# Patient Record
Sex: FEMALE | Race: WHITE | NOT HISPANIC OR LATINO | Employment: FULL TIME | URBAN - METROPOLITAN AREA
[De-identification: names, ages, dates, MRNs, and addresses within clinical notes are randomized per-mention and may not be internally consistent; named-entity substitution may affect disease eponyms.]

---

## 2022-01-11 ENCOUNTER — TELEPHONE (OUTPATIENT)
Dept: GASTROENTEROLOGY | Facility: CLINIC | Age: 27
End: 2022-01-11
Payer: COMMERCIAL

## 2022-01-11 NOTE — TELEPHONE ENCOUNTER
[facility] Call Center    Phone Message    May a detailed message be left on voicemail: yes     Reason for Call: Other: Per pt is a new pt and she has been DX with Ulcerative Colitis(UC) with her other clinic where she is from. Per pt will be having that clinic send her RECS in.      Action Taken: Message routed to:  [facility] ([facility] Gastro    Travel Screening: Not Applicable

## 2022-01-12 NOTE — TELEPHONE ENCOUNTER
REFERRAL INFORMATION:    Referring Provider:  N/A    Referring Clinic:  N/A    Reason for Visit/Diagnosis: IBD, Ulcerative Colitis      FUTURE VISIT INFORMATION:    Appointment Date: 1/25/2022    Appointment Time: 3:40 PM      NOTES STATUS DETAILS   OFFICE NOTE from Referring Provider N/A    OFFICE NOTE from Other Specialist Care Everywhere 5/26/2021, 2/9/2021, 10/6/2020 Office visit with Dr. Figueroa Prado ([facility])     9/23/2020 office visit with Dr. Edil Benitez ([facility])    HOSPITAL DISCHARGE SUMMARY/  ED VISITS N/A    OPERATIVE REPORT N/A    MEDICATION LIST N/A         ENDOSCOPY  N/A    COLONOSCOPY N/A    ERCP N/A    EUS N/A    STOOL TESTING Care Everywhere 11/9/2021, 4/28/2021, 12/15/2020, 9/25/2020   PERTINENT LABS Care Everywhere    PATHOLOGY REPORTS (RELATED) N/A    IMAGING (CT, MRI, EGD, MRCP, Small Bowel Follow Through/SBT, MR/CT Enterography) Care Everywhere [facility]:  - CT Abdomen Pelvis: 4/30/19  - US Abdomen: 4/25/19

## 2022-01-25 ENCOUNTER — VIRTUAL VISIT (OUTPATIENT)
Dept: GASTROENTEROLOGY | Facility: CLINIC | Age: 27
End: 2022-01-25
Payer: COMMERCIAL

## 2022-01-25 ENCOUNTER — PRE VISIT (OUTPATIENT)
Dept: GASTROENTEROLOGY | Facility: CLINIC | Age: 27
End: 2022-01-25
Payer: COMMERCIAL

## 2022-01-25 DIAGNOSIS — K50.10 CROHN'S DISEASE OF COLON WITHOUT COMPLICATION (H): Primary | ICD-10-CM

## 2022-01-25 DIAGNOSIS — A49.8 RECURRENT CLOSTRIDIOIDES DIFFICILE INFECTION: ICD-10-CM

## 2022-01-25 PROCEDURE — 99205 OFFICE O/P NEW HI 60 MIN: CPT | Mod: 95 | Performed by: INTERNAL MEDICINE

## 2022-01-25 RX ORDER — MESALAMINE 0.38 G/1
0.38 CAPSULE, EXTENDED RELEASE ORAL 4 TIMES DAILY
COMMUNITY
Start: 2019-12-01 | End: 2022-02-23

## 2022-01-25 RX ORDER — BUPROPION HYDROCHLORIDE 100 MG/1
100 TABLET ORAL DAILY
COMMUNITY
Start: 2021-01-01 | End: 2022-03-22

## 2022-01-25 RX ORDER — SERTRALINE HYDROCHLORIDE 100 MG/1
100 TABLET, FILM COATED ORAL 2 TIMES DAILY
COMMUNITY
Start: 2014-09-01 | End: 2022-03-22

## 2022-01-25 ASSESSMENT — ENCOUNTER SYMPTOMS
SINUS CONGESTION: 1
SINUS PAIN: 1
HOARSE VOICE: 1
EYE IRRITATION: 1

## 2022-01-25 NOTE — PATIENT INSTRUCTIONS
It was very nice to meet you today during your virtual visit.  Have outlined my recommendations below.    My office will contact you about scheduling the colonoscopy.    My office will contact you about scheduling the MRI.    You can go to your local [facility] lab to get the blood work and stool studies that have ordered.    Please continue your mesalamine as you are.  Take 4 pills daily.    Follow-up in 3 months to discuss the results of your testing.    Call or MyChart with questions.

## 2022-01-25 NOTE — LETTER
Date:January 26, 2022      Patient was self referred, no letter generated. Do not send.        [facility] Health Information

## 2022-01-25 NOTE — PROGRESS NOTES
IBD CLINIC VISIT    CC/REFERRING MD:  No ref. provider found    REASON FOR CONSULTATION: establish care Crohn's colitis    ASSESSMENT/PLAN:    1. Crohn's colitis -symptomatically, she is doing quite well.  Fecal calprotectin was in a good range in November.  Given her prior recurrent C. difficile (none since December 2020) and episode of reactive arthropathy last year I do think it is reasonable to make sure that we check a colonoscopy to ensure there is mucosal healing.  We will also check a fecal calprotectin and labs.  If mucosal healing is seen we will continue with the Apriso.  If mucosal healing is not seen we will discuss escalation of therapy.  We will get labs in preparation of escalation if needed.    -Colonoscopy  -Check labs  -Check fecal protectant  -Continue Apriso    2. Recurrent c diff -if she develops diarrhea we will recheck this.  We will check a colonoscopy to make sure that her colitis is under good control.  If she has recurrence of C. difficile we would consider an IMT.      IBD HISTORY  Age at diagnosis: 22/23 (2019)  Extent of disease: right sided colitis  Disease phenotype: inflammatory  Melly-anal disease: none  Current CD medications:  -Apriso 1500mg daily  Prior IBD surgeries: none  Prior IBD Medications:  -prednisone taper 2/2020    DRUG MONITORING  TPMT enzyme activity: pending    6-TGN/6-MMPN levels: NA    Biologic concentration: NA    DISEASE ASSESSMENT  Labs  No lab results found.    Invalid input(s):  ALB,  HGB  Fecal calprotectin: normal 11/2021  Endoscopy: see below - normal ileum. Moderate inflammation in cecum/ascending. Bx showed chronic active colitis. Remainder colon normal with normal bx  Enterography: none  C diff:   -July 2020, Oct 2020, Dec 2020 - then did 6 weeks vanco and none since    sIBDQ:   IBDQ Score Date IBDQ - Total Score  (Higher score better)   1/25/2022 61       IBD Health Care Maintenance:    Vaccinations:  All patients on biologics should avoid live  vaccines.    -- Influenza (every year)  -- TdaP (every 10 years)  -- Pneumococcal Pneumonia (once plus booster at 5 years)  -- Yearly assessment for latent Tb (verbal screening and exam, PPD or QuantiFERON-Tb testing)    One time confirmation of immunity or serologies:  -- Hepatitis A (serologies or immunizations)  -- Hepatitis B (serologies or immunizations)  -- Varicella  -- MMR  -- HPV (all aged 18-26)  -- Meningococcal meningitis (all patients at risk for meningitis)  -- Not immunosuppressed - can get all live vaccines    Bone mineral density screening   -- Recommend all patients supplement with calcium and vitamin D  -- Given prior steroid use recommend DEXA if not already done    Cancer Screening:  Colon cancer screening:  Given right sided colitis, recommend patient undergo regular dysplasia surveillance   Next dysplasia screening is recommended 2027.    Cervical cancer screening: Per OBGYN    Skin cancer screening: Not needed given no immunosuppression at this time    Depression Screening:  -- Over the last month, have you felt down, depressed, or hopeless? no  -- Over the last month, have you felt little interest or pleasure doing things? no    Misc:  -- Avoid tobacco use  -- Avoid NSAIDs as there is potentially a 25% chance of causing an IBD flare    Return to clinic in 3-4 months    Thank you for this consultation.  It was a pleasure to participate in the care of this patient; please contact us with any further questions.      This note was created with voice recognition software, and while reviewed for accuracy, typos may remain.     Luis Alberto Jacobs MD  Division of Gastroenterology, Hepatology and Nutrition  [facility]  Pager: [phone]      HPI:   Currently, here to establish care in IBD clinic.  Patient was diagnosed with Crohn's colitis in 2019.  At that time she had symptoms of nausea, bloody mucus in stool, weight loss, decreased appetite.  She underwent a colonoscopy which showed  "inflammation in the cecum and ascending.  The ileum and the rest of the colon were normal.  Biopsies showed chronic active colitis.  Upper endoscopy was unremarkable.  Patient was started on Apriso mesalamine.  She did require a prednisone taper in February 2020.  Since then, she believes she is done okay on the Apriso.    She did have 3 episodes of C. difficile.  Most recently this was in December 2020.  She is not aware of having any precipitating antibiotics but that was quite a while ago.  She was eventually treated with vancomycin 6-week taper and has not had any recurrence of the C. difficile.    She did have an episode of what she calls \"reactive arthritis\" in November 2021.  She was treated with prednisone 20 mg for 1 week and this resolved the issue.    Currently, she is having 1 bowel movement per day.  Sometimes she may have up to 3.  There is no blood.  There is no tenesmus there is no abdominal pain.    She denies any eye pain or redness.  She occasionally does get dry eyes.  She denies any current joint pains.  She denies any rashes or mouth sores.    ROS:    No fevers or chills  No weight loss  No blurry vision, double vision or change in vision  No sore throat  No lymphadenopathy  No headache, paraesthesias, or weakness in a limb  No shortness of breath or wheezing  No chest pain or pressure  No arthralgias or myalgias  No rashes or skin changes  No odynophagia or dysphagia  No BRBPR, hematochezia, melena  No dysuria, frequency or urgency  No hot/cold intolerance or polyria  No anxiety or depression    Extra intestinal manifestations of IBD:  No uveitis/episcleritis  No aphthous ulcers   No arthritis   No erythema nodosum/pyoderma gangrenosum.     PERTINENT PAST MEDICAL HISTORY:  No past medical history on file.    PREVIOUS SURGERIES:  No past surgical history on file.    PREVIOUS ENDOSCOPY:  No results found for this or any previous visit.]    ALLERGIES:   No Known Allergies    PERTINENT " MEDICATIONS:    Current Outpatient Medications:      buPROPion (WELLBUTRIN) 100 MG tablet, Take 100 mg by mouth daily, Disp: , Rfl:      mesalamine (APRISO ER) 0.375 g 24 hr capsule, Take 0.375 mg by mouth 4 times daily , Disp: , Rfl:      sertraline (ZOLOFT) 100 MG tablet, Take 100 mg by mouth 2 times daily, Disp: , Rfl:     SOCIAL HISTORY:  Social History     Socioeconomic History     Marital status: Single     Spouse name: Not on file     Number of children: Not on file     Years of education: Not on file     Highest education level: Not on file   Occupational History     Not on file   Tobacco Use     Smoking status: Never Smoker     Smokeless tobacco: Never Used   Substance and Sexual Activity     Alcohol use: Not on file     Drug use: Not on file     Sexual activity: Not on file   Other Topics Concern     Not on file   Social History Narrative     Not on file     Social Determinants of Health     Financial Resource Strain: Not on file   Food Insecurity: Not on file   Transportation Needs: Not on file   Physical Activity: Not on file   Stress: Not on file   Social Connections: Not on file   Intimate Partner Violence: Not on file   Housing Stability: Not on file       FAMILY HISTORY:  No family history on file.    Past/family/social history reviewed and no changes    PHYSICAL EXAMINATION:  Constitutional: aaox3, cooperative, pleasant, not dyspneic/diaphoretic, no acute distress  Vitals reviewed: There were no vitals taken for this visit.  Wt:   Wt Readings from Last 2 Encounters:   No data found for Wt      Eyes: Sclera anicteric/injected  Respiratory: Unlabored breathing  Skin: no jaundice  Psych: Normal affect      PERTINENT STUDIES:  Most recent CBC:  No lab results found.  Most recent hepatic panel:  No lab results found.    Invalid input(s): TB, ALK  Most recent creatinine:  No lab results found.        [facility]    Patient Name: DANIEL RITCHIE   YOB: 1995   Med Record Number:  [id number]   Date of Procedure: 11/11/2019   Referring Physician:Leonardo Qiu D.O.  Endoscopist : Fahad Cheung M.D.  Sedating Nurse: Jaz Guadarrama RN   PROCEDURE PERFORMED: Colonoscopy with   forceps biopsy    PRE OP DIAGNOSIS: Blood in stool.  Diagnostic colonoscopy.     POST OP DIAGNOSIS : Normal terminal ileum  Moderate right sided colitis    Instruments: [id number] #[id number]   Medications: Demerol 125 mg IVP, Versed 5 mg IVP. Sedation start   time: 1227 Stop Time: 1256  Visualization:: Good Tolerance: Good Complications: None  Limitations:   Extent of Exam: terminal ileum   Procedure Technique: A physical exam was performed. Informed   consent was obtained from the patient after explaining all the risks   (perforation, bleeding, infection and adverse effects to the medicine),   benefits and alternatives to the procedure which the patient appeared to   understand and so stated. The patient was connected to the monitoring   devices and placed in the left lateral position. Continuous oxygen was   provided with a nasal cannula and IV medicine administered thru an   indwelling cannula. After adequate conscious sedation was achieved, a   digital exam was performed and the colonoscope introduced in to the   rectum and advanced under direct visualization to the TI .   The TI was identified by visual landmarks. The scope was subsequently   removed slowly while carefully examining the color, texture, anatomy,   and integrity of the mucosa on the way out. In the rectum, the scope   was retroflexed to evaluate for internal hemorrhoids and anorectal   pathology. The patient was subsequently transferred to the recovery   area in satisfactory condition.   Scope Withdrawal Time ( applicable for colonoscopy ): 0:12:09.    FINDINGS: Terminal ileum - Normal. Multiple bxs taken  Cecum & Ascending colon - Moderate inflammation with punctate   ulcers were seen. The mucosa appeared edematous.   Transverse colon - Normal  Descending  colon - Normal  Sigmoid colon - Normal  Rectum - Normal.  Multiple bxs were taken and placed in separate jars as right colon, left   colon and rectum     PATHOLOGY RESULTS:  CASE: [id number]  PATIENT: DANIEL OWUSU  CLINICAL HISTORY/DIAGNOSIS: Blood in stool.  SPECIMEN SUBMITTED:  A. Terminal ileum biopsy.  B. Right colon biopsies.  C. Left colon biopsies.  D. Rectal biopsies.  Performed at [facility], [address] [place] [address] SURGICAL PATHOLOGY REPORT Specimen   No.: [id number]  DIAGNOSIS:  A. Terminal ileum, biopsy:  - no significant pathologic change.  B. Colon, right colon, biopsy:  - chronic active colitis with nonspecific features, no granulomas or  dysplasia identified.  C. Colon, left colon, biopsy:  - no significant pathologic change.  D. Rectum, rectal biopsies:  - no significant pathologic change.  GROSS DESCRIPTION:  A. Received in formalin labeled [facility] and terminal ileum  biopsy consists of seven irregular fragments of tan tissue measuring  up to 4 mm in greatest diameter. The specimen is totally submitted  into one cassette.  B. Received in formalin labeled [facility] and right colon  biopsies consists of multiple irregular fragments of tan-pink tissue  measuring 10 x 20 x 2 mm in aggregate. The specimen is totally  submitted into one cassette.  C. Received in formalin labeled [facility] and left colon biopsies  consists of two irregular fragments of tan tissue measuring up to 5 mm  in greatest diameter. The specimen is totally submitted into one  cassette.  D. Received in formalin labeled [facility] and rectal biopsies  consists of six irregular fragments of tan tissue, the largest  measuring 6 mm in greatest diameter. The specimen is totally  submitted into one cassette. ([place]/jose)  MICROSCOPIC DESCRIPTION:  A, C, D. Microscopic examination performed.  B. Sections show fragments of colonic mucosa with crypt architecture  distortion, focal crypt shortening, cryptitis, and  crypt abscess. No  granulomas or dysplasia identified. The findings raise the  possibility of chronic inflammatory bowel disease. Other differential  diagnosis includes prolonged infectious-type colitis or medication  injury. ([place]/jlc)  Final Diagnosis performed by Zaki Terrell MD Electronically signed  11/13/2019 12:34:57PM      Answers for HPI/ROS submitted by the patient on 1/25/2022  General Symptoms: No  Skin Symptoms: No  HENT Symptoms: Yes  EYE SYMPTOMS: Yes  HEART SYMPTOMS: No  LUNG SYMPTOMS: No  INTESTINAL SYMPTOMS: No  URINARY SYMPTOMS: No  GYNECOLOGIC SYMPTOMS: No  BREAST SYMPTOMS: No  SKELETAL SYMPTOMS: No  BLOOD SYMPTOMS: No  NERVOUS SYSTEM SYMPTOMS: No  MENTAL HEALTH SYMPTOMS: No  Congestion: Yes  Sinus pain: Yes   Voice hoarseness: Yes  Dry mouth: Yes  Dry eyes: Yes  Eye irritation: Yes

## 2022-01-25 NOTE — PROGRESS NOTES
Arleen is a 26 year old who is being evaluated via a billable video visit.      How would you like to obtain your AVS? MyChart  If the video visit is dropped, the invitation should be resent by: Send to e-mail at: [email or web address]  Will anyone else be joining your video visit? No      Video Start Time: 3:47 PM  Video-Visit Details    Type of service:  Video Visit    Video End Time:4:01 PM    Originating Location (pt. Location): Home    Distant Location (provider location):  [facility] [place]     Platform used for Video Visit: Icon[employer]

## 2022-01-25 NOTE — LETTER
1/25/2022         RE: Arleen Owusu  [address]  [place] [address]        Dear Colleague,    Thank you for referring your patient, Arleen Owusu, to the [facility] [place]. Please see a copy of my visit note below.    Arleen is a 26 year old who is being evaluated via a billable video visit.      How would you like to obtain your AVS? MyChart  If the video visit is dropped, the invitation should be resent by: Send to e-mail at: [email or web address]  Will anyone else be joining your video visit? No      Video Start Time: 3:47 PM  Video-Visit Details    Type of service:  Video Visit    Video End Time:4:01 PM    Originating Location (pt. Location): Home    Distant Location (provider location):  [facility] [place]     Platform used for Video Visit: Eye-Q    IBD CLINIC VISIT    CC/REFERRING MD:  No ref. provider found    REASON FOR CONSULTATION: establish care Crohn's colitis    ASSESSMENT/PLAN:    1. Crohn's colitis -symptomatically, she is doing quite well.  Fecal calprotectin was in a good range in November.  Given her prior recurrent C. difficile (none since December 2020) and episode of reactive arthropathy last year I do think it is reasonable to make sure that we check a colonoscopy to ensure there is mucosal healing.  We will also check a fecal calprotectin and labs.  If mucosal healing is seen we will continue with the Apriso.  If mucosal healing is not seen we will discuss escalation of therapy.  We will get labs in preparation of escalation if needed.    -Colonoscopy  -Check labs  -Check fecal protectant  -Continue Apriso    2. Recurrent c diff -if she develops diarrhea we will recheck this.  We will check a colonoscopy to make sure that her colitis is under good control.  If she has recurrence of C. difficile we would consider an IMT.      IBD HISTORY  Age at diagnosis: 22/23 (2019)  Extent of disease: right sided  colitis  Disease phenotype: inflammatory  Melly-anal disease: none  Current CD medications:  -Apriso 1500mg daily  Prior IBD surgeries: none  Prior IBD Medications:  -prednisone taper 2/2020    DRUG MONITORING  TPMT enzyme activity: pending    6-TGN/6-MMPN levels: NA    Biologic concentration: NA    DISEASE ASSESSMENT  Labs  No lab results found.    Invalid input(s):  ALB,  HGB  Fecal calprotectin: normal 11/2021  Endoscopy: see below - normal ileum. Moderate inflammation in cecum/ascending. Bx showed chronic active colitis. Remainder colon normal with normal bx  Enterography: none  C diff:   -July 2020, Oct 2020, Dec 2020 - then did 6 weeks vanco and none since    sIBDQ:   IBDQ Score Date IBDQ - Total Score  (Higher score better)   1/25/2022 61       IBD Health Care Maintenance:    Vaccinations:  All patients on biologics should avoid live vaccines.    -- Influenza (every year)  -- TdaP (every 10 years)  -- Pneumococcal Pneumonia (once plus booster at 5 years)  -- Yearly assessment for latent Tb (verbal screening and exam, PPD or QuantiFERON-Tb testing)    One time confirmation of immunity or serologies:  -- Hepatitis A (serologies or immunizations)  -- Hepatitis B (serologies or immunizations)  -- Varicella  -- MMR  -- HPV (all aged 18-26)  -- Meningococcal meningitis (all patients at risk for meningitis)  -- Not immunosuppressed - can get all live vaccines    Bone mineral density screening   -- Recommend all patients supplement with calcium and vitamin D  -- Given prior steroid use recommend DEXA if not already done    Cancer Screening:  Colon cancer screening:  Given right sided colitis, recommend patient undergo regular dysplasia surveillance   Next dysplasia screening is recommended 2027.    Cervical cancer screening: Per OBGYN    Skin cancer screening: Not needed given no immunosuppression at this time    Depression Screening:  -- Over the last month, have you felt down, depressed, or hopeless? no  -- Over the  "last month, have you felt little interest or pleasure doing things? no    Misc:  -- Avoid tobacco use  -- Avoid NSAIDs as there is potentially a 25% chance of causing an IBD flare    Return to clinic in 3-4 months    Thank you for this consultation.  It was a pleasure to participate in the care of this patient; please contact us with any further questions.      This note was created with voice recognition software, and while reviewed for accuracy, typos may remain.     Luis Alberto Jacobs MD  Division of Gastroenterology, Hepatology and Nutrition  [facility]  Pager: [phone]      HPI:   Currently, here to establish care in IBD clinic.  Patient was diagnosed with Crohn's colitis in 2019.  At that time she had symptoms of nausea, bloody mucus in stool, weight loss, decreased appetite.  She underwent a colonoscopy which showed inflammation in the cecum and ascending.  The ileum and the rest of the colon were normal.  Biopsies showed chronic active colitis.  Upper endoscopy was unremarkable.  Patient was started on Apriso mesalamine.  She did require a prednisone taper in February 2020.  Since then, she believes she is done okay on the Apriso.    She did have 3 episodes of C. difficile.  Most recently this was in December 2020.  She is not aware of having any precipitating antibiotics but that was quite a while ago.  She was eventually treated with vancomycin 6-week taper and has not had any recurrence of the C. difficile.    She did have an episode of what she calls \"reactive arthritis\" in November 2021.  She was treated with prednisone 20 mg for 1 week and this resolved the issue.    Currently, she is having 1 bowel movement per day.  Sometimes she may have up to 3.  There is no blood.  There is no tenesmus there is no abdominal pain.    She denies any eye pain or redness.  She occasionally does get dry eyes.  She denies any current joint pains.  She denies any rashes or mouth sores.    ROS:    No fevers or " chills  No weight loss  No blurry vision, double vision or change in vision  No sore throat  No lymphadenopathy  No headache, paraesthesias, or weakness in a limb  No shortness of breath or wheezing  No chest pain or pressure  No arthralgias or myalgias  No rashes or skin changes  No odynophagia or dysphagia  No BRBPR, hematochezia, melena  No dysuria, frequency or urgency  No hot/cold intolerance or polyria  No anxiety or depression    Extra intestinal manifestations of IBD:  No uveitis/episcleritis  No aphthous ulcers   No arthritis   No erythema nodosum/pyoderma gangrenosum.     PERTINENT PAST MEDICAL HISTORY:  No past medical history on file.    PREVIOUS SURGERIES:  No past surgical history on file.    PREVIOUS ENDOSCOPY:  No results found for this or any previous visit.]    ALLERGIES:   No Known Allergies    PERTINENT MEDICATIONS:    Current Outpatient Medications:      buPROPion (WELLBUTRIN) 100 MG tablet, Take 100 mg by mouth daily, Disp: , Rfl:      mesalamine (APRISO ER) 0.375 g 24 hr capsule, Take 0.375 mg by mouth 4 times daily , Disp: , Rfl:      sertraline (ZOLOFT) 100 MG tablet, Take 100 mg by mouth 2 times daily, Disp: , Rfl:     SOCIAL HISTORY:  Social History     Socioeconomic History     Marital status: Single     Spouse name: Not on file     Number of children: Not on file     Years of education: Not on file     Highest education level: Not on file   Occupational History     Not on file   Tobacco Use     Smoking status: Never Smoker     Smokeless tobacco: Never Used   Substance and Sexual Activity     Alcohol use: Not on file     Drug use: Not on file     Sexual activity: Not on file   Other Topics Concern     Not on file   Social History Narrative     Not on file     Social Determinants of Health     Financial Resource Strain: Not on file   Food Insecurity: Not on file   Transportation Needs: Not on file   Physical Activity: Not on file   Stress: Not on file   Social Connections: Not on file    Intimate Partner Violence: Not on file   Housing Stability: Not on file       FAMILY HISTORY:  No family history on file.    Past/family/social history reviewed and no changes    PHYSICAL EXAMINATION:  Constitutional: aaox3, cooperative, pleasant, not dyspneic/diaphoretic, no acute distress  Vitals reviewed: There were no vitals taken for this visit.  Wt:   Wt Readings from Last 2 Encounters:   No data found for Wt      Eyes: Sclera anicteric/injected  Respiratory: Unlabored breathing  Skin: no jaundice  Psych: Normal affect      PERTINENT STUDIES:  Most recent CBC:  No lab results found.  Most recent hepatic panel:  No lab results found.    Invalid input(s): TB, ALK  Most recent creatinine:  No lab results found.        [facility]    Patient Name: DANIEL RITCHIE   YOB: 1995   Med Record Number: [id number]   Date of Procedure: 11/11/2019   Referring Physician:Leonardo Qiu D.O.  Endoscopist : Fahad Cheung M.D.  Sedating Nurse: Jaz Guadarrama RN   PROCEDURE PERFORMED: Colonoscopy with   forceps biopsy    PRE OP DIAGNOSIS: Blood in stool.  Diagnostic colonoscopy.     POST OP DIAGNOSIS : Normal terminal ileum  Moderate right sided colitis    Instruments: [id number] #[id number]   Medications: Demerol 125 mg IVP, Versed 5 mg IVP. Sedation start   time: 1227 Stop Time: 1256  Visualization:: Good Tolerance: Good Complications: None  Limitations:   Extent of Exam: terminal ileum   Procedure Technique: A physical exam was performed. Informed   consent was obtained from the patient after explaining all the risks   (perforation, bleeding, infection and adverse effects to the medicine),   benefits and alternatives to the procedure which the patient appeared to   understand and so stated. The patient was connected to the monitoring   devices and placed in the left lateral position. Continuous oxygen was   provided with a nasal cannula and IV medicine administered thru an   indwelling cannula. After  adequate conscious sedation was achieved, a   digital exam was performed and the colonoscope introduced in to the   rectum and advanced under direct visualization to the TI .   The TI was identified by visual landmarks. The scope was subsequently   removed slowly while carefully examining the color, texture, anatomy,   and integrity of the mucosa on the way out. In the rectum, the scope   was retroflexed to evaluate for internal hemorrhoids and anorectal   pathology. The patient was subsequently transferred to the recovery   area in satisfactory condition.   Scope Withdrawal Time ( applicable for colonoscopy ): 0:12:09.    FINDINGS: Terminal ileum - Normal. Multiple bxs taken  Cecum & Ascending colon - Moderate inflammation with punctate   ulcers were seen. The mucosa appeared edematous.   Transverse colon - Normal  Descending colon - Normal  Sigmoid colon - Normal  Rectum - Normal.  Multiple bxs were taken and placed in separate jars as right colon, left   colon and rectum     PATHOLOGY RESULTS:  CASE: [id number]  PATIENT: ARLEEN OWUSU  CLINICAL HISTORY/DIAGNOSIS: Blood in stool.  SPECIMEN SUBMITTED:  A. Terminal ileum biopsy.  B. Right colon biopsies.  C. Left colon biopsies.  D. Rectal biopsies.  Performed at [address] SURGICAL PATHOLOGY REPORT Specimen   No.: [id number]  DIAGNOSIS:  A. Terminal ileum, biopsy:  - no significant pathologic change.  B. Colon, right colon, biopsy:  - chronic active colitis with nonspecific features, no granulomas or  dysplasia identified.  C. Colon, left colon, biopsy:  - no significant pathologic change.  D. Rectum, rectal biopsies:  - no significant pathologic change.  GROSS DESCRIPTION:  A. Received in formalin labeled Arleen Owusu and terminal ileum  biopsy consists of seven irregular fragments of tan tissue measuring  up to 4 mm in greatest diameter. The specimen is totally submitted  into one cassette.  B. Received in formalin  labeled Arleen Owusu and right colon  biopsies consists of multiple irregular fragments of tan-pink tissue  measuring 10 x 20 x 2 mm in aggregate. The specimen is totally  submitted into one cassette.  C. Received in formalin labeled Arleen Owusu and left colon biopsies  consists of two irregular fragments of tan tissue measuring up to 5 mm  in greatest diameter. The specimen is totally submitted into one  cassette.  D. Received in formalin labeled Arleen Owusu and rectal biopsies  consists of six irregular fragments of tan tissue, the largest  measuring 6 mm in greatest diameter. The specimen is totally  submitted into one cassette. (ND/jose)  MICROSCOPIC DESCRIPTION:  A, C, D. Microscopic examination performed.  B. Sections show fragments of colonic mucosa with crypt architecture  distortion, focal crypt shortening, cryptitis, and crypt abscess. No  granulomas or dysplasia identified. The findings raise the  possibility of chronic inflammatory bowel disease. Other differential  diagnosis includes prolonged infectious-type colitis or medication  injury. (WY/jlc)  Final Diagnosis performed by Zaki Terrell MD Electronically signed  11/13/2019 12:34:57PM      Answers for HPI/ROS submitted by the patient on 1/25/2022  General Symptoms: No  Skin Symptoms: No  HENT Symptoms: Yes  EYE SYMPTOMS: Yes  HEART SYMPTOMS: No  LUNG SYMPTOMS: No  INTESTINAL SYMPTOMS: No  URINARY SYMPTOMS: No  GYNECOLOGIC SYMPTOMS: No  BREAST SYMPTOMS: No  SKELETAL SYMPTOMS: No  BLOOD SYMPTOMS: No  NERVOUS SYSTEM SYMPTOMS: No  MENTAL HEALTH SYMPTOMS: No  Congestion: Yes  Sinus pain: Yes   Voice hoarseness: Yes  Dry mouth: Yes  Dry eyes: Yes  Eye irritation: Yes          Again, thank you for allowing me to participate in the care of your patient.        Sincerely,        Luis Alberto Jacobs MD

## 2022-01-26 ENCOUNTER — TELEPHONE (OUTPATIENT)
Dept: GASTROENTEROLOGY | Facility: CLINIC | Age: 27
End: 2022-01-26
Payer: COMMERCIAL

## 2022-01-26 NOTE — TELEPHONE ENCOUNTER
lvm to schedule 3-4 month follow up with DR. SCHMITZ, labs and img, left call center phone number and set mychart.

## 2022-01-27 ENCOUNTER — TELEPHONE (OUTPATIENT)
Dept: GASTROENTEROLOGY | Facility: CLINIC | Age: 27
End: 2022-01-27
Payer: COMMERCIAL

## 2022-01-27 NOTE — TELEPHONE ENCOUNTER
Screening Questions  Blue=prep questions Red=location Green=sedation   1. Are you active on mychart? Y    2. What insurance is in the chart? MEDICA     3.  Ordering/Referring Provider: Luis Alberto Jacobs MD    4. BMI 22.8, If greater than 40 review exclusion criteria also will need EXTENDED PREP    5.  Respiratory Screening (If yes to any of the following HOSPITAL setting only):     Do you use daily home oxygen? N  Do you have mod to severe Obstructive Sleep Apnea? N (can be seen at [facility] or hospital setting)    Do you have Pulmonary Hypertension? N   Do you have UNCONTROLLED asthma? N    6. Have you had a heart or lung transplant? N  (If yes, please review exclusion criteria)    7. Are you currently on dialysis?N  (If yes, schedule in HOSPITAL setting only)(If yes, please send Golytely prep)    8. Do you have chronic kidney disease? N (If yes, please send Golytely prep)    9. Have you had a stroke or Transient ischemic attack (TIA) within 6 months? N (If yes, do not schedule at [facility])    10. In the past 6 months, have you had any heart related issues including cardiomyopathy or heart attack? N (If yes, please review exclusion criteria)           If yes, did it require cardiac stenting or other implantable device?  (If yes, please review exclusion criteria)      11. Do you have any implantable devices in your body (pacemaker, defib, LVAD)? N (If yes, schedule at [facility])    12. Do you take nitroglycerin? If yes, how often? N (if yes, schedule at HOSPITAL setting)    13. Are you currently taking any blood thinners?N (If yes- inform patient to follow up with PCP or provider for follow up instructions)     14. Are you a diabetic? N (If yes, please send Golytely prep)    15. (Females) Are you currently pregnant? N  If yes, how many weeks?      16. Are you taking any prescription pain medications on a routine schedule? N If yes, MAC sedation and patient will need EXTENDED PREP.    17. Do you have any chemical dependencies  such as alcohol, street drugs, or methadone? N If yes, MAC sedation     18. Do you have any history of post-traumatic stress syndrome, severe anxiety or history of psychosis? N  If yes, MAC sedation.     19. Do you transfer independently? Y    20.  Do you have any issues with constipation? N   If yes, pt will need EXTENDED PREP     21. Preferred Pharmacy for Pre Prescription [facility]    Scheduling Details    Which Colonoscopy Prep was Sent?: MPREP  Type of Procedure Scheduled: COLON  Surgeon: NADIR  Date of Procedure: 2/14  Location: [facility]  Caller (Please ask for phone number if not scheduled by patient):       Sedation Type: MAC  Conscious Sedation- Needs  for 6 hours after the procedure  MAC/General-Needs  for 24 hours after procedure    Pre-op Required at [facility] [place] [place] and OR for MAC sedation: N  (if yes advise patient they will need a pre-op prior to procedure)      Informed patient they will need an adult  Y  Cannot take any type of public or medical transportation alone    Pre-Procedure Covid test to be completed at [facility] or Externally: 2/10    Confirmed Nurse will call to complete assessment Y    Additional comments:  ([facility] PATIENTS NEED EXTENDED PREP)

## 2022-02-01 DIAGNOSIS — Z11.59 ENCOUNTER FOR SCREENING FOR OTHER VIRAL DISEASES: Primary | ICD-10-CM

## 2022-02-04 ENCOUNTER — LAB (OUTPATIENT)
Dept: LAB | Facility: CLINIC | Age: 27
End: 2022-02-04
Payer: COMMERCIAL

## 2022-02-04 DIAGNOSIS — K50.10 CROHN'S DISEASE OF COLON WITHOUT COMPLICATION (H): ICD-10-CM

## 2022-02-04 LAB
ALBUMIN SERPL-MCNC: 4.3 G/DL (ref 3.5–5)
ALP SERPL-CCNC: 79 U/L (ref 45–120)
ALT SERPL W P-5'-P-CCNC: 12 U/L (ref 0–45)
ANION GAP SERPL CALCULATED.3IONS-SCNC: 11 MMOL/L (ref 5–18)
AST SERPL W P-5'-P-CCNC: 18 U/L (ref 0–40)
BASOPHILS # BLD AUTO: 0 10E3/UL (ref 0–0.2)
BASOPHILS NFR BLD AUTO: 0 %
BILIRUB DIRECT SERPL-MCNC: 0.2 MG/DL
BILIRUB SERPL-MCNC: 0.8 MG/DL (ref 0–1)
BUN SERPL-MCNC: 8 MG/DL (ref 8–22)
C REACTIVE PROTEIN LHE: 0.3 MG/DL (ref 0–0.8)
CALCIUM SERPL-MCNC: 9.3 MG/DL (ref 8.5–10.5)
CHLORIDE BLD-SCNC: 104 MMOL/L (ref 98–107)
CO2 SERPL-SCNC: 22 MMOL/L (ref 22–31)
CREAT SERPL-MCNC: 0.78 MG/DL (ref 0.6–1.1)
EOSINOPHIL # BLD AUTO: 0.1 10E3/UL (ref 0–0.7)
EOSINOPHIL NFR BLD AUTO: 1 %
ERYTHROCYTE [DISTWIDTH] IN BLOOD BY AUTOMATED COUNT: 13.7 % (ref 10–15)
ERYTHROCYTE [SEDIMENTATION RATE] IN BLOOD BY WESTERGREN METHOD: 7 MM/HR (ref 0–20)
FERRITIN SERPL-MCNC: 53 NG/ML (ref 10–130)
FOLATE SERPL-MCNC: 13.5 NG/ML
GFR SERPL CREATININE-BSD FRML MDRD: >90 ML/MIN/1.73M2
GLUCOSE BLD-MCNC: 82 MG/DL (ref 70–125)
HCT VFR BLD AUTO: 38.2 % (ref 35–47)
HGB BLD-MCNC: 12.5 G/DL (ref 11.7–15.7)
IGA SERPL-MCNC: 118 MG/DL (ref 65–400)
IMM GRANULOCYTES # BLD: 0 10E3/UL
IMM GRANULOCYTES NFR BLD: 0 %
LYMPHOCYTES # BLD AUTO: 2.3 10E3/UL (ref 0.8–5.3)
LYMPHOCYTES NFR BLD AUTO: 32 %
MCH RBC QN AUTO: 28.3 PG (ref 26.5–33)
MCHC RBC AUTO-ENTMCNC: 32.7 G/DL (ref 31.5–36.5)
MCV RBC AUTO: 86 FL (ref 78–100)
MONOCYTES # BLD AUTO: 0.5 10E3/UL (ref 0–1.3)
MONOCYTES NFR BLD AUTO: 7 %
NEUTROPHILS # BLD AUTO: 4.5 10E3/UL (ref 1.6–8.3)
NEUTROPHILS NFR BLD AUTO: 61 %
PLATELET # BLD AUTO: 251 10E3/UL (ref 150–450)
POTASSIUM BLD-SCNC: 4.1 MMOL/L (ref 3.5–5)
PROT SERPL-MCNC: 6.7 G/DL (ref 6–8)
RBC # BLD AUTO: 4.42 10E6/UL (ref 3.8–5.2)
SODIUM SERPL-SCNC: 137 MMOL/L (ref 136–145)
TSH SERPL DL<=0.005 MIU/L-ACNC: 1.38 UIU/ML (ref 0.3–5)
VIT B12 SERPL-MCNC: 299 PG/ML (ref 213–816)
WBC # BLD AUTO: 7.3 10E3/UL (ref 4–11)

## 2022-02-04 PROCEDURE — 85652 RBC SED RATE AUTOMATED: CPT

## 2022-02-04 PROCEDURE — 86704 HEP B CORE ANTIBODY TOTAL: CPT

## 2022-02-04 PROCEDURE — 86706 HEP B SURFACE ANTIBODY: CPT

## 2022-02-04 PROCEDURE — 36415 COLL VENOUS BLD VENIPUNCTURE: CPT

## 2022-02-04 PROCEDURE — 99000 SPECIMEN HANDLING OFFICE-LAB: CPT

## 2022-02-04 PROCEDURE — 82784 ASSAY IGA/IGD/IGG/IGM EACH: CPT

## 2022-02-04 PROCEDURE — 82607 VITAMIN B-12: CPT

## 2022-02-04 PROCEDURE — 80299 QUANTITATIVE ASSAY DRUG: CPT | Mod: 90

## 2022-02-04 PROCEDURE — 82657 ENZYME CELL ACTIVITY: CPT | Mod: 90

## 2022-02-04 PROCEDURE — 86481 TB AG RESPONSE T-CELL SUSP: CPT

## 2022-02-04 PROCEDURE — 87340 HEPATITIS B SURFACE AG IA: CPT

## 2022-02-04 PROCEDURE — 82746 ASSAY OF FOLIC ACID SERUM: CPT

## 2022-02-04 PROCEDURE — 82728 ASSAY OF FERRITIN: CPT

## 2022-02-04 PROCEDURE — 80050 GENERAL HEALTH PANEL: CPT

## 2022-02-04 PROCEDURE — 86140 C-REACTIVE PROTEIN: CPT

## 2022-02-04 PROCEDURE — 82248 BILIRUBIN DIRECT: CPT

## 2022-02-05 LAB — HBV SURFACE AG SERPL QL IA: NONREACTIVE

## 2022-02-06 ENCOUNTER — HEALTH MAINTENANCE LETTER (OUTPATIENT)
Age: 27
End: 2022-02-06

## 2022-02-07 LAB
GAMMA INTERFERON BACKGROUND BLD IA-ACNC: 0 IU/ML
HBV CORE AB SERPL QL IA: NEGATIVE
HBV SURFACE AB SERPLBLD QL IA.RAPID: NEGATIVE
M TB IFN-G BLD-IMP: NEGATIVE
M TB IFN-G CD4+ BCKGRND COR BLD-ACNC: 10 IU/ML
MITOGEN IGNF BCKGRD COR BLD-ACNC: 0 IU/ML
MITOGEN IGNF BCKGRD COR BLD-ACNC: 0 IU/ML
QUANTIFERON MITOGEN: 10 IU/ML
QUANTIFERON NIL TUBE: 0 IU/ML
QUANTIFERON TB1 TUBE: 0 IU/ML
QUANTIFERON TB2 TUBE: 0

## 2022-02-08 ENCOUNTER — TELEPHONE (OUTPATIENT)
Dept: GASTROENTEROLOGY | Facility: CLINIC | Age: 27
End: 2022-02-08
Payer: COMMERCIAL

## 2022-02-08 DIAGNOSIS — R11.0 NAUSEA: ICD-10-CM

## 2022-02-08 DIAGNOSIS — K50.10 CROHN'S DISEASE OF COLON WITHOUT COMPLICATION (H): Primary | ICD-10-CM

## 2022-02-08 LAB
6-TGN ENTSUB RBC: <22 PMOL/8X10(8)RBC (ref 235–450)
6MMP ENTSUB RBC: <334 PMOL/8X10(8)RBC
TPMT BLD-CCNC: 24.3 U/ML

## 2022-02-08 NOTE — TELEPHONE ENCOUNTER
Attempted to contact patient for pre assessment questions. No answer.     Left message to return call to [phone] #2    Sheridan Sigala RN

## 2022-02-08 NOTE — TELEPHONE ENCOUNTER
Patient scheduled for colonoscopy on 2/14/22.     Covid test scheduled: 2/10/22    Arrival time: 1000    Facility location: [facility]    Sedation type: MAC    Indication for procedure: crohns    Anticoagulations? no     Bowel prep recommendation: Miralax/Magnesium citrate/Dulcolax     Pre visit planning completed.    Sheridan Sigala RN

## 2022-02-09 RX ORDER — BISACODYL 5 MG
TABLET, DELAYED RELEASE (ENTERIC COATED) ORAL
Qty: 4 TABLET | Refills: 0 | Status: SHIPPED | OUTPATIENT
Start: 2022-02-09 | End: 2022-03-21

## 2022-02-09 RX ORDER — ONDANSETRON 4 MG/1
TABLET, FILM COATED ORAL
Qty: 3 TABLET | Refills: 0 | Status: SHIPPED | OUTPATIENT
Start: 2022-02-09 | End: 2022-03-21

## 2022-02-09 NOTE — TELEPHONE ENCOUNTER
Pre assessment questions completed for upcoming colonoscopy procedure scheduled on 2.14.2022    COVID test scheduled 2.10.2022    Reviewed procedural arrival time 1000 and facility location [facility].    Designated  policy reviewed. Instructed to have someone stay 24 hours post procedure.     Anticoagulation/blood thinners? no    Electronic implanted devices? No    Golytely prep per pt request.  Also sent pt a prescription for zofran d/t pt expressing nausea with prior colonoscopy preps.    Reviewed Golytely prep instructions with patient. No fiber/iron supplements or foods that contain nuts/seeds prior to procedure.     Prep instructions sent via [employer].  Prep prescription sent to [id number] - [place], [place] - [address] AT [facility]    Patient verbalized understanding and had no questions or concerns at this time.    Gladis Liz RN

## 2022-02-10 ENCOUNTER — LAB (OUTPATIENT)
Dept: LAB | Facility: CLINIC | Age: 27
End: 2022-02-10
Payer: COMMERCIAL

## 2022-02-10 DIAGNOSIS — Z11.59 ENCOUNTER FOR SCREENING FOR OTHER VIRAL DISEASES: ICD-10-CM

## 2022-02-10 PROCEDURE — U0005 INFEC AGEN DETEC AMPLI PROBE: HCPCS

## 2022-02-10 PROCEDURE — U0003 INFECTIOUS AGENT DETECTION BY NUCLEIC ACID (DNA OR RNA); SEVERE ACUTE RESPIRATORY SYNDROME CORONAVIRUS 2 (SARS-COV-2) (CORONAVIRUS DISEASE [COVID-19]), AMPLIFIED PROBE TECHNIQUE, MAKING USE OF HIGH THROUGHPUT TECHNOLOGIES AS DESCRIBED BY CMS-2020-01-R: HCPCS

## 2022-02-11 ENCOUNTER — LAB (OUTPATIENT)
Dept: LAB | Facility: CLINIC | Age: 27
End: 2022-02-11
Payer: COMMERCIAL

## 2022-02-11 DIAGNOSIS — K50.10 CROHN'S DISEASE OF COLON WITHOUT COMPLICATION (H): ICD-10-CM

## 2022-02-11 LAB — SARS-COV-2 RNA RESP QL NAA+PROBE: NEGATIVE

## 2022-02-11 PROCEDURE — 83993 ASSAY FOR CALPROTECTIN FECAL: CPT

## 2022-02-14 ENCOUNTER — ANESTHESIA EVENT (OUTPATIENT)
Dept: SURGERY | Facility: AMBULATORY SURGERY CENTER | Age: 27
End: 2022-02-14
Payer: COMMERCIAL

## 2022-02-14 ENCOUNTER — HOSPITAL ENCOUNTER (OUTPATIENT)
Facility: AMBULATORY SURGERY CENTER | Age: 27
End: 2022-02-14
Attending: INTERNAL MEDICINE
Payer: COMMERCIAL

## 2022-02-14 ENCOUNTER — ANESTHESIA (OUTPATIENT)
Dept: SURGERY | Facility: AMBULATORY SURGERY CENTER | Age: 27
End: 2022-02-14
Payer: COMMERCIAL

## 2022-02-14 VITALS
DIASTOLIC BLOOD PRESSURE: 70 MMHG | SYSTOLIC BLOOD PRESSURE: 110 MMHG | RESPIRATION RATE: 17 BRPM | HEIGHT: 68 IN | TEMPERATURE: 97.2 F | HEART RATE: 68 BPM | OXYGEN SATURATION: 99 % | BODY MASS INDEX: 22.73 KG/M2 | WEIGHT: 150 LBS

## 2022-02-14 VITALS — HEART RATE: 66 BPM

## 2022-02-14 LAB
CALPROTECTIN STL-MCNT: 74.1 MG/KG (ref 0–49.9)
COLONOSCOPY: NORMAL
HCG UR QL: NEGATIVE
INTERNAL QC OK POCT: NORMAL
POCT KIT EXPIRATION DATE: NORMAL
POCT KIT LOT NUMBER: NORMAL

## 2022-02-14 PROCEDURE — 88305 TISSUE EXAM BY PATHOLOGIST: CPT | Mod: 26 | Performed by: PATHOLOGY

## 2022-02-14 PROCEDURE — 88305 TISSUE EXAM BY PATHOLOGIST: CPT | Mod: TC | Performed by: INTERNAL MEDICINE

## 2022-02-14 PROCEDURE — 45380 COLONOSCOPY AND BIOPSY: CPT

## 2022-02-14 RX ORDER — LIDOCAINE 40 MG/G
CREAM TOPICAL
Status: DISCONTINUED | OUTPATIENT
Start: 2022-02-14 | End: 2022-02-14 | Stop reason: HOSPADM

## 2022-02-14 RX ORDER — ONDANSETRON 2 MG/ML
4 INJECTION INTRAMUSCULAR; INTRAVENOUS EVERY 6 HOURS PRN
Status: DISCONTINUED | OUTPATIENT
Start: 2022-02-14 | End: 2022-02-15 | Stop reason: HOSPADM

## 2022-02-14 RX ORDER — ONDANSETRON 2 MG/ML
4 INJECTION INTRAMUSCULAR; INTRAVENOUS
Status: DISCONTINUED | OUTPATIENT
Start: 2022-02-14 | End: 2022-02-14 | Stop reason: HOSPADM

## 2022-02-14 RX ORDER — NALOXONE HYDROCHLORIDE 0.4 MG/ML
0.4 INJECTION, SOLUTION INTRAMUSCULAR; INTRAVENOUS; SUBCUTANEOUS
Status: DISCONTINUED | OUTPATIENT
Start: 2022-02-14 | End: 2022-02-15 | Stop reason: HOSPADM

## 2022-02-14 RX ORDER — ONDANSETRON 4 MG/1
4 TABLET, ORALLY DISINTEGRATING ORAL EVERY 6 HOURS PRN
Status: DISCONTINUED | OUTPATIENT
Start: 2022-02-14 | End: 2022-02-15 | Stop reason: HOSPADM

## 2022-02-14 RX ORDER — SODIUM CHLORIDE, SODIUM LACTATE, POTASSIUM CHLORIDE, CALCIUM CHLORIDE 600; 310; 30; 20 MG/100ML; MG/100ML; MG/100ML; MG/100ML
INJECTION, SOLUTION INTRAVENOUS CONTINUOUS PRN
Status: DISCONTINUED | OUTPATIENT
Start: 2022-02-14 | End: 2022-02-14

## 2022-02-14 RX ORDER — PROPOFOL 10 MG/ML
INJECTION, EMULSION INTRAVENOUS PRN
Status: DISCONTINUED | OUTPATIENT
Start: 2022-02-14 | End: 2022-02-14

## 2022-02-14 RX ORDER — FLUMAZENIL 0.1 MG/ML
0.2 INJECTION, SOLUTION INTRAVENOUS
Status: DISCONTINUED | OUTPATIENT
Start: 2022-02-14 | End: 2022-02-15 | Stop reason: HOSPADM

## 2022-02-14 RX ORDER — SIMETHICONE
LIQUID (ML) MISCELLANEOUS PRN
Status: DISCONTINUED | OUTPATIENT
Start: 2022-02-14 | End: 2022-02-14 | Stop reason: HOSPADM

## 2022-02-14 RX ORDER — NALOXONE HYDROCHLORIDE 0.4 MG/ML
0.2 INJECTION, SOLUTION INTRAMUSCULAR; INTRAVENOUS; SUBCUTANEOUS
Status: DISCONTINUED | OUTPATIENT
Start: 2022-02-14 | End: 2022-02-15 | Stop reason: HOSPADM

## 2022-02-14 RX ORDER — PROCHLORPERAZINE MALEATE 10 MG
10 TABLET ORAL EVERY 6 HOURS PRN
Status: DISCONTINUED | OUTPATIENT
Start: 2022-02-14 | End: 2022-02-15 | Stop reason: HOSPADM

## 2022-02-14 RX ORDER — PROPOFOL 10 MG/ML
INJECTION, EMULSION INTRAVENOUS CONTINUOUS PRN
Status: DISCONTINUED | OUTPATIENT
Start: 2022-02-14 | End: 2022-02-14

## 2022-02-14 RX ADMIN — PROPOFOL 40 MG: 10 INJECTION, EMULSION INTRAVENOUS at 11:59

## 2022-02-14 RX ADMIN — PROPOFOL 80 MG: 10 INJECTION, EMULSION INTRAVENOUS at 11:44

## 2022-02-14 RX ADMIN — PROPOFOL 150 MCG/KG/MIN: 10 INJECTION, EMULSION INTRAVENOUS at 11:40

## 2022-02-14 RX ADMIN — SODIUM CHLORIDE, SODIUM LACTATE, POTASSIUM CHLORIDE, CALCIUM CHLORIDE: 600; 310; 30; 20 INJECTION, SOLUTION INTRAVENOUS at 10:42

## 2022-02-14 ASSESSMENT — MIFFLIN-ST. JEOR: SCORE: 1468.9

## 2022-02-14 NOTE — H&P
"Arleen Ouwsu  [id number]  female  26 year old      Reason for procedure/surgery: Crohn's Disease    There is no problem list on file for this patient.      Past Surgical History:  History reviewed. No pertinent surgical history.    Past Medical History: No past medical history on file.    Social History:   Social History     Tobacco Use     Smoking status: Never Smoker     Smokeless tobacco: Never Used   Substance Use Topics     Alcohol use: Not on file       Family History: History reviewed. No pertinent family history.    Allergies: No Known Allergies    Active Medications:   Current Outpatient Medications   Medication Sig Dispense Refill     bisacodyl (DULCOLAX) 5 MG EC tablet Take 2 tabs by mouth at 3pm one day prior to procedure.  Take 2 tabs by mouth at 11pm the night before procedure. 4 tablet 0     buPROPion (WELLBUTRIN) 100 MG tablet Take 100 mg by mouth daily       mesalamine (APRISO ER) 0.375 g 24 hr capsule Take 0.375 mg by mouth 4 times daily        ondansetron (ZOFRAN) 4 MG tablet Take one tablet by mouth 30 minutes prior to starting colonoscopy prep. May take one tablet by mouth every 6 hours as needed. 3 tablet 0     polyethylene glycol (GOLYTELY) 236 g suspension Take as directed in patient instructions.  Day before procedure at 6:00pm drink 8oz glass of mixture every 15 min until the jug is half empty.  Store the rest in the refrigerator.  At 11pm drink second half of container.  Drink one 8oz glass every 15 minutes until jug is empty. 4000 mL 0     sertraline (ZOLOFT) 100 MG tablet Take 100 mg by mouth 2 times daily         Systemic Review:   CONSTITUTIONAL: NEGATIVE for fever, chills, change in weight  ENT/MOUTH: NEGATIVE for ear, mouth and throat problems  RESP: NEGATIVE for significant cough or SOB  CV: NEGATIVE for chest pain, palpitations or peripheral edema    Physical Examination:   Vital Signs: /63   Temp 97.8  F (36.6  C) (Temporal)   Resp 16   Ht 1.727 m (5' 8\")   Wt 68 kg (150 " lb)   SpO2 96%   BMI 22.81 kg/m    GENERAL: healthy, alert and no distress  NECK: no adenopathy, no asymmetry, masses, or scars  RESP: lungs clear to auscultation - no rales, rhonchi or wheezes  CV: regular rate and rhythm, normal S1 S2, no S3 or S4, no murmur, click or rub, no peripheral edema and peripheral pulses strong  ABDOMEN: soft, nontender, no hepatosplenomegaly, no masses and bowel sounds normal  MS: no gross musculoskeletal defects noted, no edema    Plan: Appropriate to proceed as scheduled.      Luis Alberto Jacobs MD  2/14/2022    PCP:  No Ref-Primary, Physician

## 2022-02-14 NOTE — ANESTHESIA CARE TRANSFER NOTE
Patient: Arleen Owusu    Procedure: Procedure(s):  COLONOSCOPY, WITH  BIOPSY       Diagnosis: Crohn's disease of colon without complication (H) [K50.10]  Diagnosis Additional Information: No value filed.    Anesthesia Type:   No value filed.     Note:    Oropharynx: oropharynx clear of all foreign objects and spontaneously breathing  Level of Consciousness: awake  Oxygen Supplementation: room air    Independent Airway: airway patency satisfactory and stable  Dentition: dentition unchanged  Vital Signs Stable: post-procedure vital signs reviewed and stable  Report to RN Given: handoff report given  Patient transferred to: Phase II    Handoff Report: Identifed the Patient, Identified the Reponsible Provider, Reviewed the pertinent medical history, Discussed the surgical course, Reviewed Intra-OP anesthesia mangement and issues during anesthesia, Set expectations for post-procedure period and Allowed opportunity for questions and acknowledgement of understanding      Vitals:  Vitals Value Taken Time   BP 98/55    Temp 97.3    Pulse 63    Resp 18    SpO2 100        Electronically Signed By: GLORIA Jonas CRNA  February 14, 2022  12:12 PM

## 2022-02-15 LAB
PATH REPORT.COMMENTS IMP SPEC: NORMAL
PATH REPORT.COMMENTS IMP SPEC: NORMAL
PATH REPORT.FINAL DX SPEC: NORMAL
PATH REPORT.GROSS SPEC: NORMAL
PATH REPORT.MICROSCOPIC SPEC OTHER STN: NORMAL
PATH REPORT.RELEVANT HX SPEC: NORMAL
PHOTO IMAGE: NORMAL

## 2022-02-17 NOTE — ANESTHESIA POSTPROCEDURE EVALUATION
Patient: Arleen Owusu    Procedure: Procedure(s):  COLONOSCOPY, WITH  BIOPSY       Diagnosis:Crohn's disease of colon without complication (H) [K50.10]  Diagnosis Additional Information: No value filed.    Anesthesia Type:  MAC    Note:  Disposition: Outpatient   Postop Pain Control: Uneventful            Sign Out: Well controlled pain   PONV: No   Neuro/Psych: Uneventful            Sign Out: Acceptable/Baseline neuro status   Airway/Respiratory: Uneventful            Sign Out: Acceptable/Baseline resp. status   CV/Hemodynamics: Uneventful            Sign Out: Acceptable CV status; No obvious hypovolemia; No obvious fluid overload   Other NRE: NONE   DID A NON-ROUTINE EVENT OCCUR? No           Last vitals:  Vitals Value Taken Time   /70 02/14/22 1237   Temp 36.2  C (97.2  F) 02/14/22 1237   Pulse 68 02/14/22 1237   Resp 17 02/14/22 1237   SpO2 99 % 02/14/22 1237       Electronically Signed By: Luis Alberto Mccullough MD  February 16, 2022  10:52 PM

## 2022-02-17 NOTE — ANESTHESIA PREPROCEDURE EVALUATION
Anesthesia Pre-Procedure Evaluation    Patient: Arleen Owusu   MRN: [id number] : 1995        Preoperative Diagnosis: Crohn's disease of colon without complication (H) [K50.10]    Procedure : Procedure(s):  COLONOSCOPY, WITH  BIOPSY          No past medical history on file.   Past Surgical History:   Procedure Laterality Date     COLONOSCOPY N/A 2022    Procedure: COLONOSCOPY, WITH  BIOPSY;  Surgeon: Luis Alberto Jacobs MD;  Location: [facility] OR      No Known Allergies   Social History     Tobacco Use     Smoking status: Never Smoker     Smokeless tobacco: Never Used   Substance Use Topics     Alcohol use: Not on file      Wt Readings from Last 1 Encounters:   22 68 kg (150 lb)        Anesthesia Evaluation            ROS/MED HX  ENT/Pulmonary:  - neg pulmonary ROS     Neurologic:  - neg neurologic ROS     Cardiovascular:  - neg cardiovascular ROS     METS/Exercise Tolerance:     Hematologic:  - neg hematologic  ROS     Musculoskeletal:  - neg musculoskeletal ROS     GI/Hepatic:  - neg GI/hepatic ROS     Renal/Genitourinary:  - neg Renal ROS     Endo:  - neg endo ROS     Psychiatric/Substance Use:  - neg psychiatric ROS     Infectious Disease:  - neg infectious disease ROS     Malignancy:  - neg malignancy ROS     Other:  - neg other ROS          Physical Exam    Airway  airway exam normal           Respiratory Devices and Support         Dental  no notable dental history         Cardiovascular   cardiovascular exam normal          Pulmonary   pulmonary exam normal                OUTSIDE LABS:  CBC:   Lab Results   Component Value Date    WBC 7.3 2022    HGB 12.5 2022    HCT 38.2 2022     2022     BMP:   Lab Results   Component Value Date     2022    POTASSIUM 4.1 2022    CHLORIDE 104 2022    CO2 22 2022    BUN 8 2022    CR 0.78 2022    GLC 82 2022     COAGS: No results found for: PTT, INR, FIBR  POC:   Lab Results    Component Value Date    HCG Negative 02/14/2022     HEPATIC:   Lab Results   Component Value Date    ALBUMIN 4.3 02/04/2022    PROTTOTAL 6.7 02/04/2022    ALT 12 02/04/2022    AST 18 02/04/2022    ALKPHOS 79 02/04/2022    BILITOTAL 0.8 02/04/2022     OTHER:   Lab Results   Component Value Date    VERITO 9.3 02/04/2022    TSH 1.38 02/04/2022    CRP 0.3 02/04/2022    SED 7 02/04/2022       Anesthesia Plan    ASA Status:  1   NPO Status:  NPO Appropriate    Anesthesia Type: MAC.     - Reason for MAC: straight local not clinically adequate   Induction: N/a.   Maintenance: N/A.        Consents    Anesthesia Plan(s) and associated risks, benefits, and realistic alternatives discussed. Questions answered and patient/representative(s) expressed understanding.    - Discussed:     - Discussed with:  Patient         Postoperative Care       PONV prophylaxis: Ondansetron (or other 5HT-3)     Comments:                Luis Alberto Mccullough MD

## 2022-02-23 DIAGNOSIS — K50.10 CROHN'S DISEASE OF COLON WITHOUT COMPLICATION (H): Primary | ICD-10-CM

## 2022-02-23 RX ORDER — MESALAMINE 0.38 G/1
1.5 CAPSULE, EXTENDED RELEASE ORAL DAILY
Qty: 120 CAPSULE | Refills: 5 | Status: SHIPPED | OUTPATIENT
Start: 2022-02-23 | End: 2022-10-13

## 2022-03-03 ENCOUNTER — ANCILLARY PROCEDURE (OUTPATIENT)
Dept: MRI IMAGING | Facility: CLINIC | Age: 27
End: 2022-03-03
Attending: INTERNAL MEDICINE
Payer: COMMERCIAL

## 2022-03-03 DIAGNOSIS — K50.10 CROHN'S DISEASE OF COLON WITHOUT COMPLICATION (H): ICD-10-CM

## 2022-03-03 PROCEDURE — 74183 MRI ABD W/O CNTR FLWD CNTR: CPT | Performed by: STUDENT IN AN ORGANIZED HEALTH CARE EDUCATION/TRAINING PROGRAM

## 2022-03-03 PROCEDURE — 72197 MRI PELVIS W/O & W/DYE: CPT | Performed by: STUDENT IN AN ORGANIZED HEALTH CARE EDUCATION/TRAINING PROGRAM

## 2022-03-03 PROCEDURE — A9585 GADOBUTROL INJECTION: HCPCS | Performed by: STUDENT IN AN ORGANIZED HEALTH CARE EDUCATION/TRAINING PROGRAM

## 2022-03-03 RX ORDER — GADOBUTROL 604.72 MG/ML
7.5 INJECTION INTRAVENOUS ONCE
Status: COMPLETED | OUTPATIENT
Start: 2022-03-03 | End: 2022-03-03

## 2022-03-03 RX ADMIN — GADOBUTROL 7 ML: 604.72 INJECTION INTRAVENOUS at 15:57

## 2022-03-21 ENCOUNTER — VIRTUAL VISIT (OUTPATIENT)
Dept: FAMILY MEDICINE | Facility: CLINIC | Age: 27
End: 2022-03-21
Payer: COMMERCIAL

## 2022-03-21 DIAGNOSIS — K50.10 CROHN'S DISEASE OF COLON WITHOUT COMPLICATION (H): ICD-10-CM

## 2022-03-21 DIAGNOSIS — Z76.89 ENCOUNTER TO ESTABLISH CARE WITH NEW DOCTOR: ICD-10-CM

## 2022-03-21 DIAGNOSIS — F32.4 MAJOR DEPRESSIVE DISORDER WITH SINGLE EPISODE, IN PARTIAL REMISSION (H): Primary | ICD-10-CM

## 2022-03-21 DIAGNOSIS — N91.5 OLIGOMENORRHEA, UNSPECIFIED TYPE: ICD-10-CM

## 2022-03-21 PROCEDURE — 96127 BRIEF EMOTIONAL/BEHAV ASSMT: CPT | Mod: GT | Performed by: NURSE PRACTITIONER

## 2022-03-21 PROCEDURE — 99214 OFFICE O/P EST MOD 30 MIN: CPT | Mod: GT | Performed by: NURSE PRACTITIONER

## 2022-03-21 RX ORDER — BUPROPION HYDROCHLORIDE 100 MG/1
100 TABLET ORAL DAILY
Qty: 90 TABLET | Refills: 1 | Status: CANCELLED | OUTPATIENT
Start: 2022-03-21 | End: 2022-09-17

## 2022-03-21 ASSESSMENT — PATIENT HEALTH QUESTIONNAIRE - PHQ9
SUM OF ALL RESPONSES TO PHQ QUESTIONS 1-9: 4
10. IF YOU CHECKED OFF ANY PROBLEMS, HOW DIFFICULT HAVE THESE PROBLEMS MADE IT FOR YOU TO DO YOUR WORK, TAKE CARE OF THINGS AT HOME, OR GET ALONG WITH OTHER PEOPLE: NOT DIFFICULT AT ALL
SUM OF ALL RESPONSES TO PHQ QUESTIONS 1-9: 4

## 2022-03-21 NOTE — PROGRESS NOTES
Arleen is a 26 year old who is being evaluated via a billable video visit.      How would you like to obtain your AVS? MyChart  If the video visit is dropped, the invitation should be resent by: Text to cell phone: [phone]   Will anyone else be joining your video visit? No    Assessment & Plan   Arleen was seen today for establish care.    Diagnoses and all orders for this visit:    Major depressive disorder with single episode, in partial remission (H)  Stable on current therapy; denies adverse side effects; discussed need for ongoing mental health therapy; patient declined at this time feel things are stable we will revisit if and when needed; PHQ-9 score 4  Plan: Renew current antidepressants x6 months  -     buPROPion (WELLBUTRIN) 100 MG tablet; Take 1 tablet (100 mg) by mouth daily  -     sertraline (ZOLOFT) 100 MG tablet; Take 1 tablet (100 mg) by mouth 2 times daily  -Follow-up in 6 months face-to-face visit    Encounter to establish care with new doctor  Virtual visit today to establish new primary care provider transfer from [facility]; reviewed past medical history, medications,  Immunizations  -Continue current therapy  -Schedule face-to-face in 6 months    Crohn's disease of colon without complication (H)  Stable on mesalamine; colonoscopy 2/14/2022:  Simple Endoscopic Score for Crohn's Disease: 1, mucosal inflammatory changes secondary to Crohn's disease, in remission. Biopsied.   Plan: Continue follow-up with GI    Oligomenorrhea, unspecified type  Longstanding history irregular cycles with heavy menses; previously on oral contraception transition to Depo-Provera last dose 8/14/2020 wishes to resume; CT abdomen 4/30/2019 negative for adnexal masses or ovarian cyst; abdominal ultrasound 4/25/2019 within normal limits; TSH 1.38 (2/4/2022)  Plan: Work-up include additional lab and ultrasound  -US Pelvic Complete with Transvaginal  -FSH, prolactin    Review of prior  external note(s) from - [facility] reviewed  Ordering of each unique test  Prescription drug management  30 minutes spent on the date of the encounter doing chart review, history and exam, documentation and further activities per the note       FUTURE LABS:       - patient to schedule lab only appointment    FUTURE APPOINTMENTS:       - Follow-up visit in 6 months    GLORIA Christine CNP  [facility] NOMI Bacon is a 26 year old who presents for the following health issues depression, establish care.    26-year-old female with past medical history   Patient Active Problem List   Diagnosis Code     Crohn's disease of colon (H) K50.10     History of ulcerative colitis Z87.19     Tailor's bunion M21.629   Scheduled for virtual visit to establish care and renewal of antidepressants.  Patient is new to [facility] primary care transfer from [facility] and [facility].  Patient recently moved to [place] summer 2021 from [place] for work at [employer].  Has concerns today related to depression and contraception.  - Depression: Currently on Zoloft 100 mg twice daily for 8 years;  wellbutrin 100 mg daily for 2 years; previously followed by psychology as needed reports being stable on medication without concerns; denies si intent or plan  - Oligomenorrhea: History of irregular cycles since teen years; reports having missed cycles then heavy bleeding with clotting; previously on oral contraception for cycle stabilization which was effective discontinued once diagnosed with Crohn's started Depo-Provera x1 year; last dose 8/14/2020; patient states mother with difficulty conceiving and several miscarriages;: Sister with oligomenorrhea  - IBD: Crohn's colitis diagnosed 2019; followed by [facility]; currently on mesalamine  -COVID-19 vaccine: 3/24/2021 4/21/367277/29/21  moderna    PHQ 3/21/2022   PHQ-9 Total Score 4   Q9: Thoughts of better off dead/self-harm past 2 weeks Not at all     No flowsheet data found.    Cytology Report  Accession Number:[id number]                           Collected:3/3/2020 2:45:00 PM   Date Received:3/3/2020    PAP smears are subject to both false negative and false positive  results as evidenced by data published in the medical literature. Your  patient's result should be interpreted in this context, together with  the patient's history and clinical findings.    Specimen Source: Cervical Liquid Based; Cervix    Clinical History:LMP: 02/27/2020; Reflex HPV-ASCUS    Screening performed by [facility], [place] [address]    SPECIMEN ADEQUACY:  Satisfactory for evaluation; endocervical/transformation zone present.    -partially obscuring inflammation.    INTERPRETATION:  Negative for intraepithelial lesion or malignancy.      Final Diagnosis performed by Disha BUTLER(ASCP) Electronically  signed 03/06/2020 11:51:21AM        History of Present Illness       Mental Health Follow-up:  Patient presents to follow-up on Depression.Patient's depression since last visit has been:  Better  The patient is not having other symptoms associated with depression.      Any significant life events: No  Patient is not feeling anxious or having panic attacks.  Patient has no concerns about alcohol or drug use.       Today's PHQ-9         PHQ-9 Total Score: 4  PHQ-9 Q9 Thoughts of better off dead/self-harm past 2 weeks :   (P) Not at all    How difficult have these problems made it for you to do your work, take care of things at home, or get along with other people: Not difficult at all        Reason for visit:  I need my depression meds refilled. I'm not sure if this is something that Dilan can do or if she can refer me to a provider.  Symptom onset:  More than a month  Symptom intensity:  Mild  Symptom progression:  Staying the  "same  Had these symptoms before:  Yes  Has tried/received treatment for these symptoms:  Yes  Previous treatment was successful:  Yes  Prior treatment description:  Sertraline and Bupropion    She eats 2-3 servings of fruits and vegetables daily.She consumes 0 sweetened beverage(s) daily.She exercises with enough effort to increase her heart rate 10 to 19 minutes per day.  She exercises with enough effort to increase her heart rate 3 or less days per week. She is missing 1 dose(s) of medications per week.  She is not taking prescribed medications regularly due to other.     Review of Systems   Constitutional, HEENT, cardiovascular, pulmonary, gi and gu systems are negative, except as otherwise noted.      Objective    Vitals - Patient Reported  Weight (Patient Reported): 68 kg (150 lb)  Height (Patient Reported): 508 cm (16' 8\")  BMI (Based on Pt Reported Ht/Wt): 2.64      Vitals:  No vitals were obtained today due to virtual visit.    Physical Exam   GENERAL: Healthy, alert and no distress  EYES: Eyes grossly normal to inspection.  No discharge or erythema, or obvious scleral/conjunctival abnormalities.  RESP: No audible wheeze, cough, or visible cyanosis.  No visible retractions or increased work of breathing.    SKIN: Visible skin clear. No significant rash, abnormal pigmentation or lesions.  NEURO: mentation and speech appropriate for age.  PSYCH: Mentation appears normal, affect normal/bright, judgement and insight intact, normal speech and appearance well-groomed.    No results found for any visits on 03/21/22.           Video-Visit Details    Type of service:  Video Visit    Start: 03/21/2022 10:46 am  Stop: 03/21/2022 11:08 am    Originating Location (pt. Location): Home    Distant Location (provider location):  [facility]     Platform used for Video Visit: Nessa  "

## 2022-03-22 PROBLEM — K50.10 CROHN'S DISEASE OF COLON (H): Status: ACTIVE | Noted: 2019-12-01

## 2022-03-22 PROBLEM — Z87.19 HISTORY OF ULCERATIVE COLITIS: Status: ACTIVE | Noted: 2020-03-03

## 2022-03-22 RX ORDER — BUPROPION HYDROCHLORIDE 100 MG/1
100 TABLET ORAL DAILY
Qty: 90 TABLET | Refills: 1 | Status: SHIPPED | OUTPATIENT
Start: 2022-03-22 | End: 2022-11-21

## 2022-03-22 RX ORDER — SERTRALINE HYDROCHLORIDE 100 MG/1
100 TABLET, FILM COATED ORAL 2 TIMES DAILY
Qty: 180 TABLET | Refills: 1 | Status: SHIPPED | OUTPATIENT
Start: 2022-03-22 | End: 2022-11-21

## 2022-03-22 ASSESSMENT — PATIENT HEALTH QUESTIONNAIRE - PHQ9: SUM OF ALL RESPONSES TO PHQ QUESTIONS 1-9: 4

## 2022-04-02 ENCOUNTER — LAB (OUTPATIENT)
Dept: LAB | Facility: CLINIC | Age: 27
End: 2022-04-02
Payer: COMMERCIAL

## 2022-04-02 DIAGNOSIS — N91.5 OLIGOMENORRHEA, UNSPECIFIED TYPE: ICD-10-CM

## 2022-04-02 LAB
FSH SERPL-ACNC: 2.6 IU/L
PROLACTIN SERPL-MCNC: 9 UG/L (ref 3–27)

## 2022-04-02 PROCEDURE — 83001 ASSAY OF GONADOTROPIN (FSH): CPT

## 2022-04-02 PROCEDURE — 36415 COLL VENOUS BLD VENIPUNCTURE: CPT

## 2022-04-02 PROCEDURE — 84146 ASSAY OF PROLACTIN: CPT

## 2022-05-03 ENCOUNTER — ANCILLARY PROCEDURE (OUTPATIENT)
Dept: ULTRASOUND IMAGING | Facility: CLINIC | Age: 27
End: 2022-05-03
Attending: NURSE PRACTITIONER
Payer: COMMERCIAL

## 2022-05-03 DIAGNOSIS — N91.5 OLIGOMENORRHEA, UNSPECIFIED TYPE: ICD-10-CM

## 2022-05-03 PROCEDURE — 76856 US EXAM PELVIC COMPLETE: CPT | Performed by: OBSTETRICS & GYNECOLOGY

## 2022-05-03 PROCEDURE — 76830 TRANSVAGINAL US NON-OB: CPT | Performed by: OBSTETRICS & GYNECOLOGY

## 2022-05-13 NOTE — RESULT ENCOUNTER NOTE
Yeison Bacon,    Great news!  Your recent results are within the expected range. Please continue with your current plan of care to remain healthy.    Let me know if you have any questions or concerns.    Sincerely,  GLORIA Christine CNP

## 2022-05-19 ENCOUNTER — VIRTUAL VISIT (OUTPATIENT)
Dept: GASTROENTEROLOGY | Facility: CLINIC | Age: 27
End: 2022-05-19
Payer: COMMERCIAL

## 2022-05-19 VITALS — BODY MASS INDEX: 22.81 KG/M2 | WEIGHT: 150 LBS

## 2022-05-19 DIAGNOSIS — K50.10 CROHN'S DISEASE OF COLON WITHOUT COMPLICATION (H): Primary | ICD-10-CM

## 2022-05-19 DIAGNOSIS — K59.00 CONSTIPATION, UNSPECIFIED CONSTIPATION TYPE: ICD-10-CM

## 2022-05-19 DIAGNOSIS — A49.8 RECURRENT CLOSTRIDIOIDES DIFFICILE INFECTION: ICD-10-CM

## 2022-05-19 PROCEDURE — 99213 OFFICE O/P EST LOW 20 MIN: CPT | Mod: 95 | Performed by: INTERNAL MEDICINE

## 2022-05-19 ASSESSMENT — ENCOUNTER SYMPTOMS
DIARRHEA: 0
HEARTBURN: 0
ABDOMINAL PAIN: 0
BLOATING: 0
BOWEL INCONTINENCE: 0
HOARSE VOICE: 1
DEPRESSION: 1
DECREASED CONCENTRATION: 0
SINUS PAIN: 1
BLOOD IN STOOL: 0
VOMITING: 0
NECK MASS: 0
SMELL DISTURBANCE: 0
INSOMNIA: 0
NERVOUS/ANXIOUS: 0
SINUS CONGESTION: 1
TASTE DISTURBANCE: 0
CONSTIPATION: 0
TROUBLE SWALLOWING: 0
SORE THROAT: 1
NAUSEA: 1
JAUNDICE: 0
PANIC: 0
RECTAL PAIN: 0

## 2022-05-19 ASSESSMENT — PAIN SCALES - GENERAL: PAINLEVEL: NO PAIN (0)

## 2022-05-19 NOTE — PATIENT INSTRUCTIONS
It is good to see you today. I am glad things are going so well. Keep up the great work!    Continue the apriso 4 pills daily    We will follow up in 6 months with repeat labs at that time    Call or mychart with questions

## 2022-05-19 NOTE — PROGRESS NOTES
IBD CLINIC VISIT    CC/REFERRING MD:  Referred Self    REASON FOR CONSULTATION: follow up Crohn's colitis    ASSESSMENT/PLAN:    1. Crohn's colitis - has endoscopic and basically histologic healing on Apriso. Mild erythema at appendiceal orifice. Likely not significant. Will keep an eye on her symptoms. Continue current regimen for now    -Apriso 1.5 grams daily  -recheck Cr, CRP, LFTs and CBC in 6 months. Consider fecal calpro at that time to make sure it is stable    2. Constipation - well managed with PRN miralax.    3. History of recurrent c diff - if develops diarrhea will check c diff      IBD HISTORY  Age at diagnosis: 22/23 (2019)  Extent of disease: right sided colitis  Disease phenotype: inflammatory  Melly-anal disease: none  Current CD medications:  -Apriso 1500mg daily  Prior IBD surgeries: none  Prior IBD Medications:  -prednisone taper 2/2020    DRUG MONITORING  TPMT enzyme activity:     6-TGN/6-MMPN levels:    Biologic concentration:    DISEASE ASSESSMENT  Labs  Recent Labs   Lab Test 02/04/22  1149   CRP 0.3   SED 7     Fecal calprotectin: 74 2/2022  Endoscopy: Colonoscopy 2/14/22 - CDES - 1. Mild erythema at appendiceal orifice. Otherwise totally normal colon  Enterography: MRE normal 3/322  C diff:   -July 2020, Oct 2020, Dec 2020 - then did 6 weeks vanco and none since    sIBDQ:   IBDQ Score Date IBDQ - Total Score  (Higher score better)   5/19/2022 61   1/25/2022 61       IBD Health Care Maintenance:    Vaccinations:  All patients on biologics should avoid live vaccines.    -- Influenza (every year)  -- TdaP (every 10 years)  -- Pneumococcal Pneumonia (once plus booster at 5 years)  -- Yearly assessment for latent Tb (verbal screening and exam, PPD or QuantiFERON-Tb testing)     One time confirmation of immunity or serologies:  -- Hepatitis A (serologies or immunizations)  -- Hepatitis B (serologies or immunizations)  -- Varicella  -- MMR  -- HPV (all aged 18-26)  -- Meningococcal meningitis (all  patients at risk for meningitis)  -- Not immunosuppressed - can get all live vaccines     Bone mineral density screening   -- Recommend all patients supplement with calcium and vitamin D  -- Given prior steroid use recommend DEXA if not already done     Cancer Screening:  Colon cancer screening:  Given right sided colitis, recommend patient undergo regular dysplasia surveillance   Next dysplasia screening is recommended 2027.     Cervical cancer screening: Per OBGYN     Skin cancer screening: Not needed given no immunosuppression at this time    Depression Screening:  -- Over the last month, have you felt down, depressed, or hopeless? no  -- Over the last month, have you felt little interest or pleasure doing things? no    Misc:  -- Avoid tobacco use  -- Avoid NSAIDs as there is potentially a 25% chance of causing an IBD flare    Return to clinic in 6 months    Thank you for this consultation.  It was a pleasure to participate in the care of this patient; please contact us with any further questions.  I spent a total of 20 minutes during the day of encounter performed chart review, meeting with patient, patient counseling, care coordination, and documentation.      This note was created with voice recognition software, and while reviewed for accuracy, typos may remain.     Luis Alberto Jacobs MD  Division of Gastroenterology, Hepatology and Nutrition  [facility]  Pager: [phone]      HPI:   Here today for routine follow up. Doing very well! No symptoms. Had some nausea for a few days several weeks ago but this resolved.     Occasionally has some constipation. She either just lets it take care of itself or takes some miralax. Not a significant issue.    No abdominal pain. No diarrhea. No blood in stool. No tenesmus. No weight loss.     No EIM.    ROS:    No fevers or chills  No weight loss  No blurry vision, double vision or change in vision  No sore throat  No lymphadenopathy  No headache, paraesthesias, or  weakness in a limb  No shortness of breath or wheezing  No chest pain or pressure  No arthralgias or myalgias  No rashes or skin changes  No odynophagia or dysphagia  No BRBPR, hematochezia, melena  No dysuria, frequency or urgency  No hot/cold intolerance or polyria  No anxiety or depression    Extra intestinal manifestations of IBD:  No uveitis/episcleritis  No aphthous ulcers   No arthritis   No erythema nodosum/pyoderma gangrenosum.     PERTINENT PAST MEDICAL HISTORY:  No past medical history on file.    PREVIOUS SURGERIES:  Past Surgical History:   Procedure Laterality Date     COLONOSCOPY N/A 2/14/2022    Procedure: COLONOSCOPY, WITH  BIOPSY;  Surgeon: Luis Alberto Jacobs MD;  Location: [facility] OR       PREVIOUS ENDOSCOPY:  Results for orders placed or performed during the hospital encounter of 02/14/22   COLONOSCOPY   Result Value Ref Range    COLONOSCOPY       [facility]  [address] [place] [id number] [phone]     Endoscopy Department  _______________________________________________________________________________  Patient Name: Arleen Owuus              Procedure Date: 2/14/2022 11:30 AM  MRN: [id number]                       Account Number: [id number]  YOB: 1995             Admit Type: Outpatient  Age: 26                               Room: Pro 3  Gender: Female                        Note Status: Finalized  Attending MD: Luis Alberto Jacobs MD    Total Sedation Time:   _______________________________________________________________________________     Procedure:             Colonoscopy  Indications:           Crohn's disease of the colon, Doing well. Asymptomatic                          on Apriso daily. Here to assess disease activity.  Providers:             Luis Alberto Jacobs MD, Angelita Guzman CRNA, Maite Goldman CRNA  Referring MD:          Luis Alberto Jacobs MD  Requesting Provider:   Luis Alberto Jacobs,  MD  Medicines:             Monitored Anesthesia Care  Complications:         No immediate complications.  _______________________________________________________________________________  Procedure:             Pre-Anesthesia Assessment:                         - See EPIC H and P note                         After obtaining informed consent, the colonoscope was                          passed under direct vision. Throughout the procedure,                          the patient's blood pressure, pulse, and oxygen                          saturations were monitored continuously. The                          Colonoscope was introduced through the anus and                          advanced to the cecum, identified by appendiceal                          orifice and ileocecal valve. The colonoscopy was                          performed without difficulty. The patient tolerated                          the procedure well. The quality of the  bowel                          preparation was fair.                                                                                   Findings:       Skin tags were found on perianal exam.       The terminal ileum appeared normal.       A localized area of mildly erythematous mucosa was found at the        appendiceal orifice. Biopsies were taken with a cold forceps for        histology.       The Simple Endoscopic Score for Crohn's Disease was determined based on        the endoscopic appearance of the mucosa in the following segments:       - Ileum: Findings include no ulcers present, no ulcerated surfaces, no        affected surfaces and no narrowings. Segment score: 0.       - Right Colon: Findings include no ulcers present, no ulcerated        surfaces, less than 50% of surfaces affected and no narrowings. Segment        score: 1.       - Transverse Colon: Findings include no ulcers present, no ulcerated        surfaces, no affected surfaces and no narrowings. Segment score: 0.         - Left Colon: Findings include no ulcers present, no ulcerated surfaces,        no affected surfaces and no narrowings. Segment score: 0.       - Rectum: Findings include no ulcers present, no ulcerated surfaces, no        affected surfaces and no narrowings. Segment score: 0.       - Total SES-CD aggregate score: 1. Biopsies were taken with a cold        forceps for histology. Separate biopsies taken of ascending colon,        transverse colon, descending/sigmoid colon and rectum and placed in        separate jar.                                                                                   Impression:            - Preparation of the colon was fair.                         - Perianal skin tags found on perianal exam.                         - The examined portion of the ileum was normal.                         - Mildly erythematous mucosa at the appendiceal                          orifice. Biopsied.                         - Simple Endoscopic Score for Crohn's Disease: 1,                           mucosal inflammatory changes secondary to Crohn's                          disease, in remission. Biopsied.                         Overall she has mucosal healing. Very mild erythema at                          the appendiceal orifice noted, but not significant at                          this time.  Recommendation:        - Discharge patient to home (with escort).                         - Resume previous diet.                         - Continue present medications.                         - Await pathology results.                         - Repeat colonoscopy in 5 years for surveillance.                         - Return to GI office.                                                                                       ____________________  Luis Alberto Jacobs MD  2/14/2022 12:27:19 PM  I was physically present for the entire viewing portion of the exam.  __________________________  Signature of teaching  physician  Luis Alberto Jacobs MD  Number of Addenda: 0    No te Initiated On: 2/14/2022 11:30 AM  Scope In:  Scope Out:     ]    ALLERGIES:     Allergies   Allergen Reactions     Seasonale Headache, Hives and Itching     Latex Rash       PERTINENT MEDICATIONS:    Current Outpatient Medications:      buPROPion (WELLBUTRIN) 100 MG tablet, Take 1 tablet (100 mg) by mouth daily, Disp: 90 tablet, Rfl: 1     mesalamine (APRISO ER) 0.375 g 24 hr capsule, Take 4 capsules (1.5 g) by mouth daily, Disp: 120 capsule, Rfl: 5     sertraline (ZOLOFT) 100 MG tablet, Take 1 tablet (100 mg) by mouth 2 times daily, Disp: 180 tablet, Rfl: 1    SOCIAL HISTORY:  Social History     Socioeconomic History     Marital status: Single     Spouse name: Not on file     Number of children: Not on file     Years of education: Not on file     Highest education level: Not on file   Occupational History     Not on file   Tobacco Use     Smoking status: Never Smoker     Smokeless tobacco: Never Used   Substance and Sexual Activity     Alcohol use: Not on file     Drug use: Not on file     Sexual activity: Not on file   Other Topics Concern     Not on file   Social History Narrative     Not on file     Social Determinants of Health     Financial Resource Strain: Not on file   Food Insecurity: Not on file   Transportation Needs: Not on file   Physical Activity: Not on file   Stress: Not on file   Social Connections: Not on file   Intimate Partner Violence: Not on file   Housing Stability: Not on file       FAMILY HISTORY:  No family history on file.    Past/family/social history reviewed and no changes    PHYSICAL EXAMINATION:  Constitutional: aaox3, cooperative, pleasant, not dyspneic/diaphoretic, no acute distress  Vitals reviewed: Wt 68 kg (150 lb)   BMI 22.81 kg/m    Wt:   Wt Readings from Last 2 Encounters:   05/19/22 68 kg (150 lb)   02/14/22 68 kg (150 lb)      Eyes: Sclera anicteric/injected  Respiratory: Unlabored breathing  Skin: no  jaundice  Psych: Normal affect      PERTINENT STUDIES:  Most recent CBC:  Recent Labs   Lab Test 02/04/22  1149   WBC 7.3   HGB 12.5   HCT 38.2        Most recent hepatic panel:  Recent Labs   Lab Test 02/04/22  1149   ALT 12   AST 18     Most recent creatinine:  Recent Labs   Lab Test 02/04/22  1149   CR 0.78             Answers for HPI/ROS submitted by the patient on 5/19/2022  General Symptoms: No  Skin Symptoms: No  HENT Symptoms: Yes  EYE SYMPTOMS: No  HEART SYMPTOMS: No  LUNG SYMPTOMS: No  INTESTINAL SYMPTOMS: Yes  URINARY SYMPTOMS: No  GYNECOLOGIC SYMPTOMS: No  BREAST SYMPTOMS: No  SKELETAL SYMPTOMS: No  BLOOD SYMPTOMS: No  NERVOUS SYSTEM SYMPTOMS: No  MENTAL HEALTH SYMPTOMS: Yes  Ear pain: No  Ear discharge: No  Hearing loss: No  Tinnitus: No  Nosebleeds: No  Congestion: Yes  Sinus pain: Yes  Trouble swallowing: No   Voice hoarseness: Yes  Mouth sores: No  Sore throat: Yes  Tooth pain: No  Gum tenderness: No  Bleeding gums: No  Change in taste: No  Change in sense of smell: No  Dry mouth: No  Hearing aid used: No  Neck lump: No  Heart burn or indigestion: No  Nausea: Yes  Vomiting: No  Abdominal pain: No  Bloating: No  Constipation: No  Diarrhea: No  Blood in stool: No  Black stools: No  Rectal or Anal pain: No  Fecal incontinence: No  Yellowing of skin or eyes: No  Vomit with blood: No  Change in stools: No  Nervous or Anxious: No  Depression: Yes  Trouble sleeping: No  Trouble thinking or concentrating: No  Mood changes: No  Panic attacks: No

## 2022-05-19 NOTE — PROGRESS NOTES
Arleen is a 26 year old who is being evaluated via a billable video visit.      How would you like to obtain your AVS? MyChart  If the video visit is dropped, the invitation should be resent by: Send to e-mail at: [email or web address]  Will anyone else be joining your video visit? No      Video Start Time: 3:55 PM  Video-Visit Details    Type of service:  Video Visit    Video End Time:4:03 PM    Originating Location (pt. Location): Home    Distant Location (provider location):  [facility] [place]     Platform used for Video Visit: SendMe

## 2022-05-19 NOTE — LETTER
5/19/2022         RE: Arleen Owusu  [address]  [place] [address]        Dear Colleague,    Thank you for referring your patient, Arleen Owusu, to the [facility] [place]. Please see a copy of my visit note below.      IBD CLINIC VISIT    CC/REFERRING MD:  Referred Self    REASON FOR CONSULTATION: follow up Crohn's colitis    ASSESSMENT/PLAN:    1. Crohn's colitis - has endoscopic and basically histologic healing on Apriso. Mild erythema at appendiceal orifice. Likely not significant. Will keep an eye on her symptoms. Continue current regimen for now    -Apriso 1.5 grams daily  -recheck Cr, CRP, LFTs and CBC in 6 months. Consider fecal calpro at that time to make sure it is stable    2. Constipation - well managed with PRN miralax.    3. History of recurrent c diff - if develops diarrhea will check c diff      IBD HISTORY  Age at diagnosis: 22/23 (2019)  Extent of disease: right sided colitis  Disease phenotype: inflammatory  Melly-anal disease: none  Current CD medications:  -Apriso 1500mg daily  Prior IBD surgeries: none  Prior IBD Medications:  -prednisone taper 2/2020    DRUG MONITORING  TPMT enzyme activity:     6-TGN/6-MMPN levels:    Biologic concentration:    DISEASE ASSESSMENT  Labs  Recent Labs   Lab Test 02/04/22  1149   CRP 0.3   SED 7     Fecal calprotectin: 74 2/2022  Endoscopy: Colonoscopy 2/14/22 - CDES - 1. Mild erythema at appendiceal orifice. Otherwise totally normal colon  Enterography: MRE normal 3/322  C diff:   -July 2020, Oct 2020, Dec 2020 - then did 6 weeks vanco and none since    sIBDQ:   IBDQ Score Date IBDQ - Total Score  (Higher score better)   5/19/2022 61   1/25/2022 61       IBD Health Care Maintenance:    Vaccinations:  All patients on biologics should avoid live vaccines.    -- Influenza (every year)  -- TdaP (every 10 years)  -- Pneumococcal Pneumonia (once plus booster at 5 years)  -- Yearly assessment for latent Tb (verbal  screening and exam, PPD or QuantiFERON-Tb testing)     One time confirmation of immunity or serologies:  -- Hepatitis A (serologies or immunizations)  -- Hepatitis B (serologies or immunizations)  -- Varicella  -- MMR  -- HPV (all aged 18-26)  -- Meningococcal meningitis (all patients at risk for meningitis)  -- Not immunosuppressed - can get all live vaccines     Bone mineral density screening   -- Recommend all patients supplement with calcium and vitamin D  -- Given prior steroid use recommend DEXA if not already done     Cancer Screening:  Colon cancer screening:  Given right sided colitis, recommend patient undergo regular dysplasia surveillance   Next dysplasia screening is recommended 2027.     Cervical cancer screening: Per OBGYN     Skin cancer screening: Not needed given no immunosuppression at this time    Depression Screening:  -- Over the last month, have you felt down, depressed, or hopeless? no  -- Over the last month, have you felt little interest or pleasure doing things? no    Misc:  -- Avoid tobacco use  -- Avoid NSAIDs as there is potentially a 25% chance of causing an IBD flare    Return to clinic in 6 months    Thank you for this consultation.  It was a pleasure to participate in the care of this patient; please contact us with any further questions.  I spent a total of 20 minutes during the day of encounter performed chart review, meeting with patient, patient counseling, care coordination, and documentation.      This note was created with voice recognition software, and while reviewed for accuracy, typos may remain.     Luis Alberto Jacobs MD  Division of Gastroenterology, Hepatology and Nutrition  [facility]  Pager: [phone]      HPI:   Here today for routine follow up. Doing very well! No symptoms. Had some nausea for a few days several weeks ago but this resolved.     Occasionally has some constipation. She either just lets it take care of itself or takes some miralax. Not a  significant issue.    No abdominal pain. No diarrhea. No blood in stool. No tenesmus. No weight loss.     No EIM.    ROS:    No fevers or chills  No weight loss  No blurry vision, double vision or change in vision  No sore throat  No lymphadenopathy  No headache, paraesthesias, or weakness in a limb  No shortness of breath or wheezing  No chest pain or pressure  No arthralgias or myalgias  No rashes or skin changes  No odynophagia or dysphagia  No BRBPR, hematochezia, melena  No dysuria, frequency or urgency  No hot/cold intolerance or polyria  No anxiety or depression    Extra intestinal manifestations of IBD:  No uveitis/episcleritis  No aphthous ulcers   No arthritis   No erythema nodosum/pyoderma gangrenosum.     PERTINENT PAST MEDICAL HISTORY:  No past medical history on file.    PREVIOUS SURGERIES:  Past Surgical History:   Procedure Laterality Date     COLONOSCOPY N/A 2/14/2022    Procedure: COLONOSCOPY, WITH  BIOPSY;  Surgeon: Luis Alberto Jacobs MD;  Location: [facility] OR       PREVIOUS ENDOSCOPY:  Results for orders placed or performed during the hospital encounter of 02/14/22   COLONOSCOPY   Result Value Ref Range    COLONOSCOPY       [facility]  [address] [place] [id number] [phone]     Endoscopy Department  _______________________________________________________________________________  Patient Name: Arleen Owusu              Procedure Date: 2/14/2022 11:30 AM  MRN: [id number]                       Account Number: [id number]  YOB: 1995             Admit Type: Outpatient  Age: 26                               Room: Pro 3  Gender: Female                        Note Status: Finalized  Attending MD: Luis Alberto Jacobs MD    Total Sedation Time:   _______________________________________________________________________________     Procedure:             Colonoscopy  Indications:           Crohn's disease of the colon, Doing well. Asymptomatic                           on Apriso daily. Here to assess disease activity.  Providers:             Luis Alberto Jacobs MD, Angelita Guzman CRNA, Maite Goldman CRNA  Referring MD:          Luis Alberto Jacobs MD  Requesting Provider:   Luis Alberto Jacobs MD  Medicines:             Monitored Anesthesia Care  Complications:         No immediate complications.  _______________________________________________________________________________  Procedure:             Pre-Anesthesia Assessment:                         - See EPIC H and P note                         After obtaining informed consent, the colonoscope was                          passed under direct vision. Throughout the procedure,                          the patient's blood pressure, pulse, and oxygen                          saturations were monitored continuously. The                          Colonoscope was introduced through the anus and                          advanced to the cecum, identified by appendiceal                          orifice and ileocecal valve. The colonoscopy was                          performed without difficulty. The patient tolerated                          the procedure well. The quality of the  bowel                          preparation was fair.                                                                                   Findings:       Skin tags were found on perianal exam.       The terminal ileum appeared normal.       A localized area of mildly erythematous mucosa was found at the        appendiceal orifice. Biopsies were taken with a cold forceps for        histology.       The Simple Endoscopic Score for Crohn's Disease was determined based on        the endoscopic appearance of the mucosa in the following segments:       - Ileum: Findings include no ulcers present, no ulcerated surfaces, no        affected surfaces and no narrowings. Segment score: 0.       - Right Colon: Findings include no ulcers  present, no ulcerated        surfaces, less than 50% of surfaces affected and no narrowings. Segment        score: 1.       - Transverse Colon: Findings include no ulcers present, no ulcerated        surfaces, no affected surfaces and no narrowings. Segment score: 0.        - Left Colon: Findings include no ulcers present, no ulcerated surfaces,        no affected surfaces and no narrowings. Segment score: 0.       - Rectum: Findings include no ulcers present, no ulcerated surfaces, no        affected surfaces and no narrowings. Segment score: 0.       - Total SES-CD aggregate score: 1. Biopsies were taken with a cold        forceps for histology. Separate biopsies taken of ascending colon,        transverse colon, descending/sigmoid colon and rectum and placed in        separate jar.                                                                                   Impression:            - Preparation of the colon was fair.                         - Perianal skin tags found on perianal exam.                         - The examined portion of the ileum was normal.                         - Mildly erythematous mucosa at the appendiceal                          orifice. Biopsied.                         - Simple Endoscopic Score for Crohn's Disease: 1,                           mucosal inflammatory changes secondary to Crohn's                          disease, in remission. Biopsied.                         Overall she has mucosal healing. Very mild erythema at                          the appendiceal orifice noted, but not significant at                          this time.  Recommendation:        - Discharge patient to home (with escort).                         - Resume previous diet.                         - Continue present medications.                         - Await pathology results.                         - Repeat colonoscopy in 5 years for surveillance.                         - Return to GI office.                                                                                        ____________________  Luis Alberto Jacobs MD  2/14/2022 12:27:19 PM  I was physically present for the entire viewing portion of the exam.  __________________________  Signature of teaching physician  Luis Alberto Jacobs MD  Number of Addenda: 0    No te Initiated On: 2/14/2022 11:30 AM  Scope In:  Scope Out:     ]    ALLERGIES:     Allergies   Allergen Reactions     Seasonale Headache, Hives and Itching     Latex Rash       PERTINENT MEDICATIONS:    Current Outpatient Medications:      buPROPion (WELLBUTRIN) 100 MG tablet, Take 1 tablet (100 mg) by mouth daily, Disp: 90 tablet, Rfl: 1     mesalamine (APRISO ER) 0.375 g 24 hr capsule, Take 4 capsules (1.5 g) by mouth daily, Disp: 120 capsule, Rfl: 5     sertraline (ZOLOFT) 100 MG tablet, Take 1 tablet (100 mg) by mouth 2 times daily, Disp: 180 tablet, Rfl: 1    SOCIAL HISTORY:  Social History     Socioeconomic History     Marital status: Single     Spouse name: Not on file     Number of children: Not on file     Years of education: Not on file     Highest education level: Not on file   Occupational History     Not on file   Tobacco Use     Smoking status: Never Smoker     Smokeless tobacco: Never Used   Substance and Sexual Activity     Alcohol use: Not on file     Drug use: Not on file     Sexual activity: Not on file   Other Topics Concern     Not on file   Social History Narrative     Not on file     Social Determinants of Health     Financial Resource Strain: Not on file   Food Insecurity: Not on file   Transportation Needs: Not on file   Physical Activity: Not on file   Stress: Not on file   Social Connections: Not on file   Intimate Partner Violence: Not on file   Housing Stability: Not on file       FAMILY HISTORY:  No family history on file.    Past/family/social history reviewed and no changes    PHYSICAL EXAMINATION:  Constitutional: aaox3, cooperative, pleasant, not dyspneic/diaphoretic,  no acute distress  Vitals reviewed: Wt 68 kg (150 lb)   BMI 22.81 kg/m    Wt:   Wt Readings from Last 2 Encounters:   05/19/22 68 kg (150 lb)   02/14/22 68 kg (150 lb)      Eyes: Sclera anicteric/injected  Respiratory: Unlabored breathing  Skin: no jaundice  Psych: Normal affect      PERTINENT STUDIES:  Most recent CBC:  Recent Labs   Lab Test 02/04/22  1149   WBC 7.3   HGB 12.5   HCT 38.2        Most recent hepatic panel:  Recent Labs   Lab Test 02/04/22  1149   ALT 12   AST 18     Most recent creatinine:  Recent Labs   Lab Test 02/04/22  1149   CR 0.78             Answers for HPI/ROS submitted by the patient on 5/19/2022  General Symptoms: No  Skin Symptoms: No  HENT Symptoms: Yes  EYE SYMPTOMS: No  HEART SYMPTOMS: No  LUNG SYMPTOMS: No  INTESTINAL SYMPTOMS: Yes  URINARY SYMPTOMS: No  GYNECOLOGIC SYMPTOMS: No  BREAST SYMPTOMS: No  SKELETAL SYMPTOMS: No  BLOOD SYMPTOMS: No  NERVOUS SYSTEM SYMPTOMS: No  MENTAL HEALTH SYMPTOMS: Yes  Ear pain: No  Ear discharge: No  Hearing loss: No  Tinnitus: No  Nosebleeds: No  Congestion: Yes  Sinus pain: Yes  Trouble swallowing: No   Voice hoarseness: Yes  Mouth sores: No  Sore throat: Yes  Tooth pain: No  Gum tenderness: No  Bleeding gums: No  Change in taste: No  Change in sense of smell: No  Dry mouth: No  Hearing aid used: No  Neck lump: No  Heart burn or indigestion: No  Nausea: Yes  Vomiting: No  Abdominal pain: No  Bloating: No  Constipation: No  Diarrhea: No  Blood in stool: No  Black stools: No  Rectal or Anal pain: No  Fecal incontinence: No  Yellowing of skin or eyes: No  Vomit with blood: No  Change in stools: No  Nervous or Anxious: No  Depression: Yes  Trouble sleeping: No  Trouble thinking or concentrating: No  Mood changes: No  Panic attacks: No        Sincerely,    Luis Alberto Jacobs MD

## 2022-05-20 ENCOUNTER — TELEPHONE (OUTPATIENT)
Dept: GASTROENTEROLOGY | Facility: CLINIC | Age: 27
End: 2022-05-20

## 2022-05-26 ENCOUNTER — TELEPHONE (OUTPATIENT)
Dept: GASTROENTEROLOGY | Facility: CLINIC | Age: 27
End: 2022-05-26
Payer: COMMERCIAL

## 2022-06-08 ENCOUNTER — VIRTUAL VISIT (OUTPATIENT)
Dept: FAMILY MEDICINE | Facility: CLINIC | Age: 27
End: 2022-06-08
Payer: COMMERCIAL

## 2022-06-08 DIAGNOSIS — Z30.42 ENCOUNTER FOR DEPO-PROVERA CONTRACEPTION: Primary | ICD-10-CM

## 2022-06-08 PROCEDURE — 99214 OFFICE O/P EST MOD 30 MIN: CPT | Mod: 95 | Performed by: NURSE PRACTITIONER

## 2022-06-08 ASSESSMENT — PATIENT HEALTH QUESTIONNAIRE - PHQ9: SUM OF ALL RESPONSES TO PHQ QUESTIONS 1-9: 2

## 2022-06-08 NOTE — PROGRESS NOTES
Arleen is a 26 year old who is being evaluated via a billable video visit.      How would you like to obtain your AVS? MyChart  If the video visit is dropped, the invitation should be resent by: Text to cell phone: [phone]  Will anyone else be joining your video visit? No    Video Start Time: see below    Assessment & Plan     Arleen was seen today for consult.    Diagnoses and all orders for this visit:    Encounter for Depo-Provera contraception  Previously tolerated well; no STI concerns; review high risk health precautions meets criteria       - schedule MA appointment for injection; will need HCG    -     HCG qualitative urine; Future  -     medroxyPROGESTERone (DEPO-PROVERA) injection 150 mg       FUTURE APPOINTMENTS:Return in 3 months (on 2022) for next injection.    GLORIA Christine [facility]   Arleen is a 26 year old who presents for the following health issues   26 year old  year old female with PMH   Patient Active Problem List   Diagnosis Code     Crohn's disease of colon (H) K50.10     History of ulcerative colitis Z87.19     Tailor's bunion M21.629     Virtual visit for acute office visit related to/establish care/to discuss     Resume Depo-Provera injections for contraceptive. She plans to resume dating with reduction in COVID Restrictions. Last Depo injection March 3/2020; tolerated well; denies smoking, no history clotting disorders; no current migraines with aura; LMP .      Contraception start -   Method interested in: depo or other injectable              Methods used previously: depo provera  Problems with previous methods: no    History of pregnancies:         Patient's last menstrual period was 2022.         No results found for: PAP  : 0  Para: 0  Menstrual cycle: regular  Flow: heavy  History of migraines: YES  Smoker: no  1st degree relative with History of: none    Accompanying Signs & Symptoms:    Dysuria: no  Vaginal discharge: no  Painful intercourse: no    Precipitating and/or Alleviating factors:    Currently sexually active: YES  In stable relationship: no  Desire STD testing: no  Are you planning a pregnancy soon: no      History of Present Illness       Reason for visit:  Birth control    She eats 2-3 servings of fruits and vegetables daily.She consumes 0 sweetened beverage(s) daily.She exercises with enough effort to increase her heart rate 10 to 19 minutes per day.  She exercises with enough effort to increase her heart rate 4 days per week. She is missing 1 dose(s) of medications per week.  She is not taking prescribed medications regularly due to remembering to take.             Review of Systems   Constitutional, HEENT, cardiovascular, pulmonary, gi and gu systems are negative, except as otherwise noted.      Objective       Vitals:  No vitals were obtained today due to virtual visit.    Physical Exam   GENERAL: Healthy, alert and no distress  EYES: Eyes grossly normal to inspection.  No discharge or erythema, or obvious scleral/conjunctival abnormalities.  RESP: No audible wheeze, cough, or visible cyanosis.  No visible retractions or increased work of breathing.    SKIN: Visible skin clear. No significant rash, abnormal pigmentation or lesions.  NEURO: Cranial nerves grossly intact.  Mentation and speech appropriate for age.  PSYCH: Mentation appears normal, affect normal/bright, judgement and insight intact, normal speech and appearance well-groomed.                Video-Visit Details    Type of service:  Video Visit    Video End Time:see below    Originating Location (pt. Location): Home    Distant Location (provider location):  [facility]     Platform used for Video Visit: [employer]

## 2022-06-13 RX ORDER — MEDROXYPROGESTERONE ACETATE 150 MG/ML
150 INJECTION, SUSPENSION INTRAMUSCULAR
Status: ACTIVE | OUTPATIENT
Start: 2022-06-13 | End: 2023-06-08

## 2022-06-20 ENCOUNTER — LAB (OUTPATIENT)
Dept: LAB | Facility: CLINIC | Age: 27
End: 2022-06-20
Payer: COMMERCIAL

## 2022-06-20 ENCOUNTER — ALLIED HEALTH/NURSE VISIT (OUTPATIENT)
Dept: FAMILY MEDICINE | Facility: CLINIC | Age: 27
End: 2022-06-20

## 2022-06-20 DIAGNOSIS — Z30.42 ENCOUNTER FOR DEPO-PROVERA CONTRACEPTION: Primary | ICD-10-CM

## 2022-06-20 DIAGNOSIS — Z30.42 ENCOUNTER FOR DEPO-PROVERA CONTRACEPTION: ICD-10-CM

## 2022-06-20 LAB — HCG UR QL: NEGATIVE

## 2022-06-20 PROCEDURE — 99207 PR NO CHARGE NURSE ONLY: CPT

## 2022-06-20 PROCEDURE — 96372 THER/PROPH/DIAG INJ SC/IM: CPT | Performed by: NURSE PRACTITIONER

## 2022-06-20 PROCEDURE — 81025 URINE PREGNANCY TEST: CPT

## 2022-06-20 RX ADMIN — MEDROXYPROGESTERONE ACETATE 150 MG: 150 INJECTION, SUSPENSION INTRAMUSCULAR at 09:06

## 2022-06-20 NOTE — PROGRESS NOTES
Clinic Administered Medication Documentation    Administrations This Visit     medroxyPROGESTERone (DEPO-PROVERA) injection 150 mg     Admin Date  06/20/2022 Action  Given Dose  150 mg Route  Intramuscular Site  Right Ventrogluteal Administered By  Treva Almanzar    Ordering Provider: Dilan Dan APRN CNP    Patient Supplied?: No                  Depo Provera Documentation    URINE HCG: negative    Depo-Provera Standing Order inclusion/exclusion criteria reviewed. {Reference  Depo-Provera Standing Order :[id number]}  Patient meets: inclusion criteria     BP: Data Unavailable  LAST PAP/EXAM: No results found for: PAP    Prior to injection, verified patient identity using patient's name and date of birth. Medication was administered. Please see MAR and medication order for additional information.     Was entire vial of medication used? Yes  Vial/Syringe: Single dose vial  Expiration Date:  09/23    Patient instructed to report any adverse reaction to staff immediately .  NEXT INJECTION DUE: 9/5/22 - 9/19/22

## 2022-08-25 ENCOUNTER — PATIENT OUTREACH (OUTPATIENT)
Dept: GASTROENTEROLOGY | Facility: CLINIC | Age: 27
End: 2022-08-25

## 2022-08-25 DIAGNOSIS — K50.10 CROHN'S DISEASE OF COLON WITHOUT COMPLICATION (H): Primary | ICD-10-CM

## 2022-08-25 NOTE — PROGRESS NOTES
This patient is coming to our lab tomorrow morning, the comment says for labs for you. We have no orders for her. Please advise/enter orders, or they can be faxed to [phone], thank you.

## 2022-08-25 NOTE — TELEPHONE ENCOUNTER
Patient calls to report nausea, loss of appetite,   Increase of stools from 1 to 2 or 3   Symptoms for the last 2 weeks   Lab orders placed and lab appt.

## 2022-08-26 ENCOUNTER — LAB (OUTPATIENT)
Dept: LAB | Facility: CLINIC | Age: 27
End: 2022-08-26
Payer: COMMERCIAL

## 2022-08-26 DIAGNOSIS — K50.10 CROHN'S DISEASE OF COLON WITHOUT COMPLICATION (H): ICD-10-CM

## 2022-08-26 DIAGNOSIS — R10.13 ABDOMINAL PAIN, EPIGASTRIC: Primary | ICD-10-CM

## 2022-08-26 LAB
ALBUMIN SERPL BCG-MCNC: 4.7 G/DL (ref 3.5–5.2)
ALP SERPL-CCNC: 91 U/L (ref 35–104)
ALT SERPL W P-5'-P-CCNC: 16 U/L (ref 10–35)
ANION GAP SERPL CALCULATED.3IONS-SCNC: 11 MMOL/L (ref 7–15)
AST SERPL W P-5'-P-CCNC: 25 U/L (ref 10–35)
BASOPHILS # BLD AUTO: 0 10E3/UL (ref 0–0.2)
BASOPHILS NFR BLD AUTO: 0 %
BILIRUB DIRECT SERPL-MCNC: <0.2 MG/DL (ref 0–0.3)
BILIRUB SERPL-MCNC: 0.4 MG/DL
BUN SERPL-MCNC: 9 MG/DL (ref 6–20)
CALCIUM SERPL-MCNC: 9.6 MG/DL (ref 8.6–10)
CHLORIDE SERPL-SCNC: 103 MMOL/L (ref 98–107)
CREAT SERPL-MCNC: 0.77 MG/DL (ref 0.51–0.95)
CRP SERPL-MCNC: 3.55 MG/L
DEPRECATED HCO3 PLAS-SCNC: 22 MMOL/L (ref 22–29)
EOSINOPHIL # BLD AUTO: 0.1 10E3/UL (ref 0–0.7)
EOSINOPHIL NFR BLD AUTO: 1 %
ERYTHROCYTE [DISTWIDTH] IN BLOOD BY AUTOMATED COUNT: 13.6 % (ref 10–15)
ERYTHROCYTE [SEDIMENTATION RATE] IN BLOOD BY WESTERGREN METHOD: 8 MM/HR (ref 0–20)
GFR SERPL CREATININE-BSD FRML MDRD: >90 ML/MIN/1.73M2
GLUCOSE SERPL-MCNC: 81 MG/DL (ref 70–99)
HCT VFR BLD AUTO: 41.3 % (ref 35–47)
HGB BLD-MCNC: 13.7 G/DL (ref 11.7–15.7)
IMM GRANULOCYTES # BLD: 0 10E3/UL
IMM GRANULOCYTES NFR BLD: 0 %
LYMPHOCYTES # BLD AUTO: 2.8 10E3/UL (ref 0.8–5.3)
LYMPHOCYTES NFR BLD AUTO: 39 %
MCH RBC QN AUTO: 27.7 PG (ref 26.5–33)
MCHC RBC AUTO-ENTMCNC: 33.2 G/DL (ref 31.5–36.5)
MCV RBC AUTO: 84 FL (ref 78–100)
MONOCYTES # BLD AUTO: 0.5 10E3/UL (ref 0–1.3)
MONOCYTES NFR BLD AUTO: 7 %
NEUTROPHILS # BLD AUTO: 3.9 10E3/UL (ref 1.6–8.3)
NEUTROPHILS NFR BLD AUTO: 54 %
PLATELET # BLD AUTO: 271 10E3/UL (ref 150–450)
POTASSIUM SERPL-SCNC: 4.3 MMOL/L (ref 3.4–5.3)
PROT SERPL-MCNC: 7.2 G/DL (ref 6.4–8.3)
RBC # BLD AUTO: 4.94 10E6/UL (ref 3.8–5.2)
SODIUM SERPL-SCNC: 136 MMOL/L (ref 136–145)
WBC # BLD AUTO: 7.3 10E3/UL (ref 4–11)

## 2022-08-26 PROCEDURE — 86140 C-REACTIVE PROTEIN: CPT

## 2022-08-26 PROCEDURE — 80053 COMPREHEN METABOLIC PANEL: CPT

## 2022-08-26 PROCEDURE — 82248 BILIRUBIN DIRECT: CPT

## 2022-08-26 PROCEDURE — 36415 COLL VENOUS BLD VENIPUNCTURE: CPT

## 2022-08-26 PROCEDURE — 85652 RBC SED RATE AUTOMATED: CPT

## 2022-08-26 PROCEDURE — 85025 COMPLETE CBC W/AUTO DIFF WBC: CPT

## 2022-09-21 ENCOUNTER — TELEPHONE (OUTPATIENT)
Dept: FAMILY MEDICINE | Facility: CLINIC | Age: 27
End: 2022-09-21

## 2022-09-21 NOTE — TELEPHONE ENCOUNTER
Second attempt to complete virtual rooming.   No answer, unable to LVM  @ 2:00 PM      09/21/22  Gladis Gibbs CMA

## 2022-09-21 NOTE — TELEPHONE ENCOUNTER
Attempted to contact patient to complete virtual rooming.   No answer, LVM @ 1:37 PM    09/21/22  Gladis Gibbs CMA

## 2022-10-03 ENCOUNTER — HEALTH MAINTENANCE LETTER (OUTPATIENT)
Age: 27
End: 2022-10-03

## 2022-10-13 ENCOUNTER — MYC MEDICAL ADVICE (OUTPATIENT)
Dept: GASTROENTEROLOGY | Facility: CLINIC | Age: 27
End: 2022-10-13

## 2022-10-13 DIAGNOSIS — K50.10 CROHN'S DISEASE OF COLON WITHOUT COMPLICATION (H): ICD-10-CM

## 2022-10-13 RX ORDER — MESALAMINE 0.38 G/1
1.5 CAPSULE, EXTENDED RELEASE ORAL DAILY
Qty: 120 CAPSULE | Refills: 1 | Status: SHIPPED | OUTPATIENT
Start: 2022-10-13 | End: 2022-12-20

## 2022-10-13 NOTE — TELEPHONE ENCOUNTER
Mesalamine refill sent. Patient instructed to schedule follow up appointment, per Dr. Jacobs's clinic visit note.

## 2022-10-17 DIAGNOSIS — K50.10 CROHN'S DISEASE OF COLON WITHOUT COMPLICATION (H): ICD-10-CM

## 2022-10-17 RX ORDER — MESALAMINE 0.38 G/1
CAPSULE, EXTENDED RELEASE ORAL
Qty: 120 CAPSULE | Refills: 5 | OUTPATIENT
Start: 2022-10-17

## 2022-10-26 ENCOUNTER — VIRTUAL VISIT (OUTPATIENT)
Dept: FAMILY MEDICINE | Facility: CLINIC | Age: 27
End: 2022-10-26
Payer: COMMERCIAL

## 2022-10-26 DIAGNOSIS — Z30.014 ENCOUNTER FOR INITIAL PRESCRIPTION OF INTRAUTERINE CONTRACEPTIVE DEVICE (IUD): Primary | ICD-10-CM

## 2022-10-26 PROCEDURE — 99213 OFFICE O/P EST LOW 20 MIN: CPT | Mod: 95 | Performed by: NURSE PRACTITIONER

## 2022-10-26 NOTE — PROGRESS NOTES
Arleen is a 26 year old who is being evaluated via a billable video visit.      How would you like to obtain your AVS? MyChart  If the video visit is dropped, the invitation should be resent by: Text to cell phone: [phone]  Will anyone else be joining your video visit? No        Assessment & Plan     Arleen was seen today for consult and iud.    Diagnoses and all orders for this visit:    Encounter for initial prescription of intrauterine contraceptive device (IUD)  -     REVIEW OF HEALTH MAINTENANCE PROTOCOL ORDERS  -     Ob/Gyn Referral; Future    Other orders  -     PRIMARY CARE FOLLOW-UP SCHEDULING; Future      No follow-ups on file.   Follow-up Visit   Expected date:  Feb 26, 2023 (Approximate)      Follow Up Appointment Details:     Follow-up with whom?: Me    Follow-Up for what?: Adult Preventive    How?: In Person                    GLORIA Christine CNP  [facility]   Arleen is a 26 year old, presenting for the following health issues: Consult and IUD      History of Present Illness       Reason for visit:  I would like to get an IUD    She eats 2-3 servings of fruits and vegetables daily.She consumes 0 sweetened beverage(s) daily.She exercises with enough effort to increase her heart rate 10 to 19 minutes per day.  She exercises with enough effort to increase her heart rate 3 or less days per week. She is missing 2 dose(s) of medications per week.  She is not taking prescribed medications regularly due to remembering to take.             Review of Systems   Constitutional, HEENT, cardiovascular, pulmonary, gi and gu systems are negative, except as otherwise noted.      Objective           Vitals:  No vitals were obtained today due to virtual visit.    Physical Exam   GENERAL: Healthy, alert and no distress  EYES: Eyes grossly normal to inspection.  No discharge or erythema, or obvious scleral/conjunctival abnormalities.  RESP: No audible wheeze, cough, or  visible cyanosis.  No visible retractions or increased work of breathing.    SKIN: Visible skin clear. No significant rash, abnormal pigmentation or lesions.  NEURO: Cranial nerves grossly intact.  Mentation and speech appropriate for age.  PSYCH: Mentation appears normal, affect normal/bright, judgement and insight intact, normal speech and appearance well-groomed.                Video-Visit Details    Video Start Time: 515pm    Type of service:  Video Visit    Video End Time:530    Originating Location (pt. Location): Home    Distant Location (provider location):  On-site    Platform used for Video Visit: Nessa

## 2022-10-28 ENCOUNTER — MYC MEDICAL ADVICE (OUTPATIENT)
Dept: GASTROENTEROLOGY | Facility: CLINIC | Age: 27
End: 2022-10-28

## 2022-10-28 DIAGNOSIS — R19.7 DIARRHEA: ICD-10-CM

## 2022-10-28 DIAGNOSIS — K50.10 CROHN'S DISEASE OF COLON WITHOUT COMPLICATION (H): Primary | ICD-10-CM

## 2022-10-28 NOTE — TELEPHONE ENCOUNTER
Received callback from patient regarding symptoms.  Patient reports symptoms started about 2-3 weeks ago. Stools occur 3 times a day, almost immediately after eat. Reports abdominal pain is worse after bowel movement. Stool intermittently has minimal blood, otherwise is normal in color. Noticed increase in mucus with BMs. Reports stool is on the softer side but denies diarrhea; states the stool feels solid while defecating, however it appears to disintegrate when it hits the water. No recent travel or change in diet.   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 States she may not have taken her mesalamine every day in September and missed some doses.     Writer instructed patient to complete lab draw and stool studies, orders placed. Also instructed patient to update care team if there are any other symptoms or changes.

## 2022-10-28 NOTE — TELEPHONE ENCOUNTER
Writer attempted to contact the patient to obtain more details regarding current symptoms. No answer, left voicemail, awaiting callback. Also sent [email or web address] message.     Placed routine labs and stool studies ordered.     Updated MD, agreed w/ plan to complete labs. Requested an update of all current symptoms once writer receives callback from patient.

## 2022-10-29 DIAGNOSIS — K50.10 CROHN'S DISEASE OF COLON WITHOUT COMPLICATION (H): Primary | ICD-10-CM

## 2022-10-29 DIAGNOSIS — R19.7 DIARRHEA, UNSPECIFIED TYPE: ICD-10-CM

## 2022-10-30 ENCOUNTER — LAB (OUTPATIENT)
Dept: LAB | Facility: CLINIC | Age: 27
End: 2022-10-30
Payer: COMMERCIAL

## 2022-10-30 DIAGNOSIS — K50.10 CROHN'S DISEASE OF COLON WITHOUT COMPLICATION (H): ICD-10-CM

## 2022-10-30 DIAGNOSIS — R19.7 DIARRHEA: ICD-10-CM

## 2022-10-30 DIAGNOSIS — R19.7 DIARRHEA, UNSPECIFIED TYPE: ICD-10-CM

## 2022-10-30 PROCEDURE — 82653 EL-1 FECAL QUANTITATIVE: CPT

## 2022-10-30 PROCEDURE — 87493 C DIFF AMPLIFIED PROBE: CPT | Mod: 59

## 2022-10-30 PROCEDURE — 83993 ASSAY FOR CALPROTECTIN FECAL: CPT

## 2022-10-30 PROCEDURE — 87506 IADNA-DNA/RNA PROBE TQ 6-11: CPT

## 2022-10-31 LAB — C DIFF TOX B STL QL: NEGATIVE

## 2022-11-02 LAB
CALPROTECTIN STL-MCNT: 170 MG/KG (ref 0–49.9)
ELASTASE PANC STL-MCNT: >800 UG/G

## 2022-11-03 ENCOUNTER — TELEPHONE (OUTPATIENT)
Dept: GASTROENTEROLOGY | Facility: CLINIC | Age: 27
End: 2022-11-03

## 2022-11-03 DIAGNOSIS — K50.10 CROHN'S DISEASE OF COLON WITHOUT COMPLICATION (H): Primary | ICD-10-CM

## 2022-11-03 RX ORDER — BUDESONIDE 3 MG/1
CAPSULE, COATED PELLETS ORAL
Qty: 168 CAPSULE | Refills: 0 | Status: SHIPPED | OUTPATIENT
Start: 2022-11-03 | End: 2023-01-18

## 2022-11-03 NOTE — TELEPHONE ENCOUNTER
----- Message from Luis Alberto Jacobs MD sent at 11/3/2022  9:07 AM CDT -----  Regarding: Pt with Crohn's colitis  Hi Arleen Reese's fecal calpro is elevated. She has had right sided colitis in the past (colonsocopy earlier this year quite good).    Hugh, can you check in with her and if still having symptoms then let's give her a course of Entocort 9 mg x 6 weeks, then 6 mg x 2 weeks then 3 mg x 2 weeks and then stop.    She should continue her apriso    She has appointment with Shanae on 11/22/22 which is perfect.    I would favor giving her this course of Entocort and then repeating a colonoscopy in 4-6 months.    If she has persistent symptoms and/or if her colon shows more inflammation then we can further discuss biologic therapy.    Shanae, just including you as you are seeing her soon. Let's make sure she has all the appropriate labs for biologic therapy if needed.    Thanks everyone,  PATRICE

## 2022-11-03 NOTE — TELEPHONE ENCOUNTER
Check-in: Patient reports 2-3 stools/day, which is some improvement. Minimal blood in stools, no increase since prior check-in. Still feels discomfort in abdominal where colitis was previously. Consistent nausea and decreased appetite. Discussed starting Entocort prescription, continuing mesalamine medication, follow up colonoscopy 4-6 months, and upcoming appointment with Shanae--patient agreeable to plan.     Ordered prescription.

## 2022-11-04 DIAGNOSIS — K50.10 CROHN'S DISEASE OF COLON WITHOUT COMPLICATION (H): Primary | ICD-10-CM

## 2022-11-04 NOTE — PROGRESS NOTES
Colonoscopy order placed. Inil[employer] message sent to endoscopy scheduling team with instructions to schedule in 4-6 months.

## 2022-11-07 ENCOUNTER — MYC MEDICAL ADVICE (OUTPATIENT)
Dept: GASTROENTEROLOGY | Facility: CLINIC | Age: 27
End: 2022-11-07

## 2022-11-11 ENCOUNTER — MYC MEDICAL ADVICE (OUTPATIENT)
Dept: GASTROENTEROLOGY | Facility: CLINIC | Age: 27
End: 2022-11-11

## 2022-11-14 NOTE — TELEPHONE ENCOUNTER
Called to remind patient of their upcoming appointment with our GI clinic, on Tuesday 11/22/2022 at 7:45AM with Gladis Swanson. This appointment is scheduled as an in-person appt. Please arrive 15 minutes early to check in for your appointment. , if your appointment is virtual (video or telephone) you need to be in [place] for the visit. To reschedule or cancel patient to call [phone].    Earline Mota MA

## 2022-11-17 NOTE — TELEPHONE ENCOUNTER
[facility] paperwork completed. Faxed to patient's employer and scanned into the patient's chart. Notified patient via [employer] that paperwork has been faxed.

## 2022-11-19 DIAGNOSIS — F32.4 MAJOR DEPRESSIVE DISORDER WITH SINGLE EPISODE, IN PARTIAL REMISSION (H): ICD-10-CM

## 2022-11-21 RX ORDER — SERTRALINE HYDROCHLORIDE 100 MG/1
100 TABLET, FILM COATED ORAL 2 TIMES DAILY
Qty: 180 TABLET | Refills: 3 | Status: SHIPPED | OUTPATIENT
Start: 2022-11-21 | End: 2024-03-22

## 2022-11-21 RX ORDER — BUPROPION HYDROCHLORIDE 100 MG/1
100 TABLET ORAL DAILY
Qty: 90 TABLET | Refills: 3 | Status: SHIPPED | OUTPATIENT
Start: 2022-11-21 | End: 2023-05-23

## 2022-11-21 ASSESSMENT — ENCOUNTER SYMPTOMS
HALLUCINATIONS: 0
PALPITATIONS: 1
JAUNDICE: 0
BLOOD IN STOOL: 1
STIFFNESS: 1
DEPRESSION: 1
EXERCISE INTOLERANCE: 0
FEVER: 0
EYE PAIN: 0
DOUBLE VISION: 0
INSOMNIA: 0
DECREASED APPETITE: 1
JOINT SWELLING: 0
CHILLS: 0
HYPOTENSION: 0
BOWEL INCONTINENCE: 0
SYNCOPE: 0
LEG PAIN: 0
ORTHOPNEA: 0
RECTAL PAIN: 0
EYE WATERING: 0
NAIL CHANGES: 0
BACK PAIN: 0
EYE IRRITATION: 1
MUSCLE CRAMPS: 0
NIGHT SWEATS: 0
WEIGHT GAIN: 0
DECREASED CONCENTRATION: 1
NERVOUS/ANXIOUS: 0
INCREASED ENERGY: 1
MUSCLE WEAKNESS: 0
BLOATING: 1
NECK PAIN: 0
VOMITING: 0
FATIGUE: 1
HYPERTENSION: 0
NAUSEA: 1
WEIGHT LOSS: 1
LIGHT-HEADEDNESS: 0
CONSTIPATION: 0
PANIC: 0
POOR WOUND HEALING: 0
SLEEP DISTURBANCES DUE TO BREATHING: 0
ABDOMINAL PAIN: 1
POLYPHAGIA: 0
MYALGIAS: 1
POLYDIPSIA: 0
HEARTBURN: 0
DIARRHEA: 0
ALTERED TEMPERATURE REGULATION: 0
EYE REDNESS: 1
ARTHRALGIAS: 1
SKIN CHANGES: 1

## 2022-11-21 NOTE — PROGRESS NOTES
IBD CLINIC VISIT    CC/REFERRING MD:  Referred Self    REASON FOR CONSULTATION: follow up Crohn's colitis    ASSESSMENT/PLAN:    1. Crohn's colitis  Current medication: Apriso 1.5 g daily  Current clinical disease activity: HBI 2, symptoms improving with Enotocrt  Current endoscopic disease activity: 2/14/22 - CDES - 1. Mild erythema at appendiceal orifice. Otherwise totally normal colon    In February pt had endoscopic and basically histologic healing on Apriso with mild erythema at appendiceal orifice. However in early October pt had increasing symptoms of loose urgent stool, scant blood, tenesmus, RLQ pain, fatigue, and joint pain. Stool studies showed elevated fecal calprotectin to 170, otherwise labs and stool studies wnl. She started on Enotocort 11/4/22 (2.5 weeks ago) with good response, symptoms much and improved and stool pattern near baseline. Plan to continue Enotocort taper per below and Apriso 1.5 g daily. Plan to get colonoscopy in next 4-6 months for disease assessment. If there is inflammation on colonoscopy or if symptoms do not continue to be controlled with Entocort, may need to consider biologic therapy - will get labs including TB and Hep B serologies given this possibility.  -- Continue Apriso 1.5 grams daily  -- Labs for possible biolgic therapy (Hep B serology, Hep C, TB Quant Gold, TPMT)  -- Repeat colonoscopy in 4-6 months  -- Continue Enocort taper (Entocort 9 mg x 6 weeks, then 6 mg x 2 weeks then 3 mg x 2 weeks and then stop)  -- Labs (Cr, CRP, LFTs and CBC) every 6 months  -- Let us know if you symptoms change or worsen    2. Constipation - well managed with PRN miralax.    3. History of recurrent c diff - C.Diff was negative with stool studies 10/30/22.      IBD HISTORY  Age at diagnosis: 22/23 (2019)  Extent of disease: right sided colitis  Disease phenotype: inflammatory  Melly-anal disease: none  Current CD medications:  -Apriso 1500mg daily  Prior IBD surgeries: none  Prior IBD  Medications:  -prednisone taper 2/2020    DRUG MONITORING  TPMT enzyme activity:     6-TGN/6-MMPN levels:    Biologic concentration:    DISEASE ASSESSMENT  Labs  Recent Labs   Lab Test 10/29/22  1215 10/29/22  1214 08/26/22  1318   CRP  --  3.93 3.55   SED 8  --  8     Fecal calprotectin: 170 (10/30/22); 74 (2/2022)  Endoscopy: Colonoscopy 2/14/22 - CDES - 1. Mild erythema at appendiceal orifice. Otherwise totally normal colon  Enterography: MRE normal 3/322  C diff: negative 10/30/22  -July 2020, Oct 2020, Dec 2020 - then did 6 weeks vanco and none since    sIBDQ:   IBDQ Score Date IBDQ - Total Score  (Higher score better)   5/19/2022 61   1/25/2022 61       IBD Health Care Maintenance:    Vaccinations:  All patients on biologics should avoid live vaccines.    -- Influenza (every year)  -- TdaP (every 10 years)  -- Pneumococcal Pneumonia (once plus booster at 5 years)  -- Yearly assessment for latent Tb (verbal screening and exam, PPD or QuantiFERON-Tb testing)     One time confirmation of immunity or serologies:  -- Hepatitis A (serologies or immunizations)  -- Hepatitis B (serologies or immunizations)  -- Varicella  -- MMR  -- HPV (all aged 18-26)  -- Meningococcal meningitis (all patients at risk for meningitis)  -- Not immunosuppressed - can get all live vaccines     Bone mineral density screening   -- Recommend all patients supplement with calcium and vitamin D  -- Given prior steroid use recommend DEXA if not already done     Cancer Screening:  Colon cancer screening:  Given right sided colitis, recommend patient undergo regular dysplasia surveillance   Next dysplasia screening is recommended 2027.     Cervical cancer screening: Per OBGYN     Skin cancer screening: Not needed given no immunosuppression at this time    Depression Screening:  -- Over the last month, have you felt down, depressed, or hopeless? no  -- Over the last month, have you felt little interest or pleasure doing things? no    Misc:  --  Avoid tobacco use  -- Avoid NSAIDs as there is potentially a 25% chance of causing an IBD flare    Return to clinic in 3 months with Shanae, 6 months with Dr. Jacobs    Thank you for this consultation.  It was a pleasure to participate in the care of this patient; please contact us with any further questions.      This note was created with voice recognition software, and while reviewed for accuracy, typos may remain.    55 Minutes was spent on the date of the encounter during chart review, history and exam, documentation, and further activities as noted     Shanae Swanson PA-C  Division of Gastroenterology, Hepatology & Nutrition  [facility]        HPI:   Arleen is here today for follow up with recent increasing symptoms.    In early October patient started having increasing symptoms - urgent bowel movements 3x/day, soft but not liquid, occasional small amount of blood in stool, + tenesmus and night time awakening. She had increasing RLQ abdominal pain. She also experienced increasing fatigue and joint pain in hands and knees.  Labs and stool studies completed 10/30/22 which showed fecal calprotectin elevated to 170. Infectious stool studies negative.Other labs including CRP, ESR, Albumin, and Hgb normal. Entocort started 11/4/22 after fecal calprotectin resulted.     Today she reports significant improvement in symptoms since starting Entocort. Bowel pattern has returned to normal, RLQ pain has improved but still has tenderness. She also notes improvement in fatigue but still gets tired quickly.Joint pan resolved.    Current BM Pattern - 1 stool per day, formed. No blood. No urgency. No nighttime awakening. No tenesmus. Normally more constipation. Miralax as needed but hasn't needed recently.     Some abdominal tenderness occasionally, slightly tender to palpation. No bloating, reflux, no nausea, no vomiting.    No fevers or chills.     Notes increased dry eye. Bump on left side flank. Felt like burst in  shower. No EIM (joint pain resolved)    Avoiding NSAIDs. No tobacco.    Feels like she lost a little bit of weight recently because her pants feel a little looser. Per review, current weight similar to 6 months ago.  Wt Readings from Last 5 Encounters:   11/22/22 69 kg (152 lb 3.2 oz)   05/19/22 68 kg (150 lb)   02/14/22 68 kg (150 lb)     Lester Robles Index:  General well-being: slightly below average = 1  Abdominal pain: Mild = 1  Number of liquid stools per day: 0  Abdominal mass: None = 0  Current Complications: none    ROS:  Complete 10 System ROS performed. All are negative except as documented below, in the HPI, or in patient questionnaire from today's visit.  No fevers or chills  No weight loss  No blurry vision, double vision or change in vision  No sore throat  No lymphadenopathy  No headache, paraesthesias, or weakness in a limb  No shortness of breath or wheezing  No chest pain or pressure  No arthralgias or myalgias  No rashes or skin changes  No odynophagia or dysphagia  No BRBPR, hematochezia, melena  No dysuria, frequency or urgency  No hot/cold intolerance or polyria  No anxiety or depression    Extra intestinal manifestations of IBD:  No uveitis/episcleritis  No aphthous ulcers   No arthritis   No erythema nodosum/pyoderma gangrenosum.     ROS:    No fevers or chills  No weight loss  No blurry vision, double vision or change in vision  No sore throat  No lymphadenopathy  No headache, paraesthesias, or weakness in a limb  No shortness of breath or wheezing  No chest pain or pressure  No arthralgias or myalgias  No rashes or skin changes  No odynophagia or dysphagia  No BRBPR, hematochezia, melena  No dysuria, frequency or urgency  No hot/cold intolerance or polyria  No anxiety or depression    Extra intestinal manifestations of IBD:  No uveitis/episcleritis  No aphthous ulcers   No arthritis   No erythema nodosum/pyoderma gangrenosum.     PERTINENT PAST MEDICAL HISTORY:  No past medical history  on file.    PREVIOUS SURGERIES:  Past Surgical History:   Procedure Laterality Date     COLONOSCOPY N/A 2/14/2022    Procedure: COLONOSCOPY, WITH  BIOPSY;  Surgeon: Luis Alberto Jacobs MD;  Location: [facility] OR     PREVIOUS ENDOSCOPY:  Results for orders placed or performed during the hospital encounter of 02/14/22   COLONOSCOPY   Result Value Ref Range    COLONOSCOPY       [facility]  [address] [place], [place] [address]., [place] [address] [phone]     Endoscopy Department  _______________________________________________________________________________  Patient Name: Arleen Owusu              Procedure Date: 2/14/2022 11:30 AM  MRN: [id number]                       Account Number: [id number]  YOB: 1995             Admit Type: Outpatient  Age: 26                               Room: Pro 3  Gender: Female                        Note Status: Finalized  Attending MD: Luis Alberto Jacobs MD    Total Sedation Time:   _______________________________________________________________________________     Procedure:             Colonoscopy  Indications:           Crohn's disease of the colon, Doing well. Asymptomatic                          on Apriso daily. Here to assess disease activity.  Providers:             Luis Alberto Jacobs MD, Angelita Guzman CRNA, Maite Goldman CRNA  Referring MD:          Luis Alberto Jacobs MD  Requesting Provider:   Luis Alberto Jacobs MD  Medicines:             Monitored Anesthesia Care  Complications:         No immediate complications.  _______________________________________________________________________________  Procedure:             Pre-Anesthesia Assessment:                         - See EPIC H and P note                         After obtaining informed consent, the colonoscope was                          passed under direct vision. Throughout the procedure,                          the patient's blood pressure, pulse, and oxygen                           saturations were monitored continuously. The                          Colonoscope was introduced through the anus and                          advanced to the cecum, identified by appendiceal                          orifice and ileocecal valve. The colonoscopy was                          performed without difficulty. The patient tolerated                          the procedure well. The quality of the  bowel                          preparation was fair.                                                                                   Findings:       Skin tags were found on perianal exam.       The terminal ileum appeared normal.       A localized area of mildly erythematous mucosa was found at the        appendiceal orifice. Biopsies were taken with a cold forceps for        histology.       The Simple Endoscopic Score for Crohn's Disease was determined based on        the endoscopic appearance of the mucosa in the following segments:       - Ileum: Findings include no ulcers present, no ulcerated surfaces, no        affected surfaces and no narrowings. Segment score: 0.       - Right Colon: Findings include no ulcers present, no ulcerated        surfaces, less than 50% of surfaces affected and no narrowings. Segment        score: 1.       - Transverse Colon: Findings include no ulcers present, no ulcerated        surfaces, no affected surfaces and no narrowings. Segment score: 0.        - Left Colon: Findings include no ulcers present, no ulcerated surfaces,        no affected surfaces and no narrowings. Segment score: 0.       - Rectum: Findings include no ulcers present, no ulcerated surfaces, no        affected surfaces and no narrowings. Segment score: 0.       - Total SES-CD aggregate score: 1. Biopsies were taken with a cold        forceps for histology. Separate biopsies taken of ascending colon,        transverse colon, descending/sigmoid colon and rectum and placed in         separate jar.                                                                                   Impression:            - Preparation of the colon was fair.                         - Perianal skin tags found on perianal exam.                         - The examined portion of the ileum was normal.                         - Mildly erythematous mucosa at the appendiceal                          orifice. Biopsied.                         - Simple Endoscopic Score for Crohn's Disease: 1,                           mucosal inflammatory changes secondary to Crohn's                          disease, in remission. Biopsied.                         Overall she has mucosal healing. Very mild erythema at                          the appendiceal orifice noted, but not significant at                          this time.  Recommendation:        - Discharge patient to home (with escort).                         - Resume previous diet.                         - Continue present medications.                         - Await pathology results.                         - Repeat colonoscopy in 5 years for surveillance.                         - Return to GI office.                                                                                       ____________________  Luis Alberto Jacobs MD  2/14/2022 12:27:19 PM  I was physically present for the entire viewing portion of the exam.  __________________________  Signature of teaching physician  Luis Alberto Jacobs MD  Number of Addenda: 0    No te Initiated On: 2/14/2022 11:30 AM  Scope In:  Scope Out:         ALLERGIES:     Allergies   Allergen Reactions     Seasonale Headache, Hives and Itching     Latex Rash       PERTINENT MEDICATIONS:    Current Outpatient Medications:      budesonide (ENTOCORT EC) 3 MG EC capsule, Take 3 capsules (9 mg) by mouth every morning for 42 days, THEN 2 capsules (6 mg) every morning for 14 days, THEN 1 capsule (3 mg) every morning for 14 days., Disp: 168 capsule,  Rfl: 0     buPROPion (WELLBUTRIN) 100 MG tablet, Take 1 tablet (100 mg) by mouth daily, Disp: 90 tablet, Rfl: 3     mesalamine (APRISO ER) 0.375 g 24 hr capsule, Take 4 capsules (1.5 g) by mouth daily More refills at next clinic visit, Disp: 120 capsule, Rfl: 1     sertraline (ZOLOFT) 100 MG tablet, Take 1 tablet (100 mg) by mouth 2 times daily, Disp: 180 tablet, Rfl: 3    Current Facility-Administered Medications:      medroxyPROGESTERone (DEPO-PROVERA) injection 150 mg, 150 mg, Intramuscular, Q90 Days, Dilan Dan APRN CNP, 150 mg at 06/20/22 0906    SOCIAL HISTORY:  Social History     Socioeconomic History     Marital status: Single     Spouse name: Not on file     Number of children: Not on file     Years of education: Not on file     Highest education level: Not on file   Occupational History     Not on file   Tobacco Use     Smoking status: Never     Smokeless tobacco: Never   Substance and Sexual Activity     Alcohol use: Not on file     Drug use: Not on file     Sexual activity: Not on file   Other Topics Concern     Not on file   Social History Narrative     Not on file     Social Determinants of Health     Financial Resource Strain: Not on file   Food Insecurity: Not on file   Transportation Needs: Not on file   Physical Activity: Not on file   Stress: Not on file   Social Connections: Not on file   Intimate Partner Violence: Not on file   Housing Stability: Not on file       FAMILY HISTORY:  No family history on file.    Past/family/social history reviewed and no changes    PHYSICAL EXAMINATION:  Constitutional: aaox3, cooperative, pleasant, not dyspneic/diaphoretic, no acute distress  Vitals reviewed: There were no vitals taken for this visit.  Wt:   Wt Readings from Last 2 Encounters:   05/19/22 68 kg (150 lb)   02/14/22 68 kg (150 lb)      Eyes: Sclera anicteric/injected  Respiratory: Unlabored breathing  Skin: no jaundice  Psych: Normal affect      PERTINENT STUDIES:  Most recent CBC:  Recent  Labs   Lab Test 10/29/22  1214 08/26/22  1318   WBC 6.2 7.3   HGB 13.1 13.7   HCT 38.5 41.3    271     Most recent hepatic panel:  Recent Labs   Lab Test 10/29/22  1213 08/26/22  1318   ALT 25 16   AST 19 25     Most recent creatinine:  Recent Labs   Lab Test 10/29/22  1213 08/26/22  1318   CR 0.70 0.77     Answers for HPI/ROS submitted by the patient on 11/21/2022  General Symptoms: Yes  Skin Symptoms: Yes  HENT Symptoms: No  EYE SYMPTOMS: Yes  HEART SYMPTOMS: Yes  LUNG SYMPTOMS: No  INTESTINAL SYMPTOMS: Yes  URINARY SYMPTOMS: No  GYNECOLOGIC SYMPTOMS: No  BREAST SYMPTOMS: No  SKELETAL SYMPTOMS: Yes  BLOOD SYMPTOMS: No  NERVOUS SYSTEM SYMPTOMS: No  MENTAL HEALTH SYMPTOMS: Yes  Fever: No  Loss of appetite: Yes  Weight loss: Yes  Weight gain: No  Fatigue: Yes  Night sweats: No  Chills: No  Increased stress: Yes  Excessive hunger: No  Excessive thirst: No  Feeling hot or cold when others believe the temperature is normal: No  Loss of height: No  Post-operative complications: No  Surgical site pain: No  Hallucinations: No  Change in or Loss of Energy: Yes  Hyperactivity: No  Confusion: No  Changes in hair: No  Changes in moles/birth marks: Yes  Itching: No  Rashes: No  Changes in nails: No  Acne: Yes  Hair in places you don't want it: No  Change in facial hair: No  Warts: No  Non-healing sores: No  Scarring: No  Flaking of skin: No  Color changes of hands/feet in cold : No  Sun sensitivity: No  Skin thickening: No  Eye pain: No  Vision loss: No  Dry eyes: Yes  Watery eyes: No  Eye bulging: No  Double vision: No  Flashing of lights: No  Spots: No  Floaters: No  Redness: Yes  Crossed eyes: No  Tunnel Vision: No  Yellowing of eyes: No  Eye irritation: Yes  Chest pain or pressure: No  Fast or irregular heartbeat: Yes  Pain in legs with walking: No  Trouble breathing while lying down: No  Fingers or toes appear blue: No  High blood pressure: No  Low blood pressure: No  Fainting: No  Murmurs: No  Pacemaker:  No  Varicose veins: No  Edema or swelling: No  Wake up at night with shortness of breath: No  Light-headedness: No  Exercise intolerance: No  Heart burn or indigestion: No  Nausea: Yes  Vomiting: No  Abdominal pain: Yes  Bloating: Yes  Constipation: No  Diarrhea: No  Blood in stool: Yes  Black stools: No  Rectal or Anal pain: No  Fecal incontinence: No  Yellowing of skin or eyes: No  Vomit with blood: No  Change in stools: Yes  Back pain: No  Muscle aches: Yes  Neck pain: No  Swollen joints: No  Joint pain: Yes  Bone pain: No  Muscle cramps: No  Muscle weakness: No  Joint stiffness: Yes  Bone fracture: No  Nervous or Anxious: No  Depression: Yes  Trouble sleeping: No  Trouble thinking or concentrating: Yes  Mood changes: No  Panic attacks: No

## 2022-11-22 ENCOUNTER — OFFICE VISIT (OUTPATIENT)
Dept: GASTROENTEROLOGY | Facility: CLINIC | Age: 27
End: 2022-11-22
Payer: COMMERCIAL

## 2022-11-22 VITALS
DIASTOLIC BLOOD PRESSURE: 62 MMHG | HEIGHT: 68 IN | HEART RATE: 79 BPM | SYSTOLIC BLOOD PRESSURE: 100 MMHG | BODY MASS INDEX: 23.07 KG/M2 | OXYGEN SATURATION: 96 % | WEIGHT: 152.2 LBS

## 2022-11-22 DIAGNOSIS — K50.10 CROHN'S DISEASE OF COLON WITHOUT COMPLICATION (H): Primary | ICD-10-CM

## 2022-11-22 PROCEDURE — 99215 OFFICE O/P EST HI 40 MIN: CPT | Performed by: DIETITIAN, REGISTERED

## 2022-11-22 ASSESSMENT — PAIN SCALES - GENERAL: PAINLEVEL: NO PAIN (0)

## 2022-11-22 NOTE — PROGRESS NOTES
"Chief Complaint   Patient presents with     Follow Up       Vitals:    11/22/22 0803   BP: 100/62   BP Location: Left arm   Patient Position: Left side   Cuff Size: Adult Regular   Pulse: 79   SpO2: 96%   Weight: 69 kg (152 lb 3.2 oz)   Height: 1.727 m (5' 8\")       Body mass index is 23.14 kg/m .     Not taking Depo; taking calcium supplement daily (500 mg).     Timmy Márquez    "

## 2022-11-22 NOTE — LETTER
11/22/2022         RE: Arleen Owusu  [address]  [place] [address]        Dear Colleague,    Thank you for referring your patient, Arleen Owusu, to the [facility] [place]. Please see a copy of my visit note below.    IBD CLINIC VISIT    CC/REFERRING MD:  Referred Self    REASON FOR CONSULTATION: follow up Crohn's colitis    ASSESSMENT/PLAN:    1. Crohn's colitis  Current medication: Apriso 1.5 g daily  Current clinical disease activity: HBI 2, symptoms improving with Enotocrt  Current endoscopic disease activity: 2/14/22 - CDES - 1. Mild erythema at appendiceal orifice. Otherwise totally normal colon    In February pt had endoscopic and basically histologic healing on Apriso with mild erythema at appendiceal orifice. However in early October pt had increasing symptoms of loose urgent stool, scant blood, tenesmus, RLQ pain, fatigue, and joint pain. Stool studies showed elevated fecal calprotectin to 170, otherwise labs and stool studies wnl. She started on Enotocort 11/4/22 (2.5 weeks ago) with good response, symptoms much and improved and stool pattern near baseline. Plan to continue Enotocort taper per below and Apriso 1.5 g daily. Plan to get colonoscopy in next 4-6 months for disease assessment. If there is inflammation on colonoscopy or if symptoms do not continue to be controlled with Entocort, may need to consider biologic therapy - will get labs including TB and Hep B serologies given this possibility.  -- Continue Apriso 1.5 grams daily  -- Labs for possible biolgic therapy (Hep B serology, Hep C, TB Quant Gold, TPMT)  -- Repeat colonoscopy in 4-6 months  -- Continue Enocort taper (Entocort 9 mg x 6 weeks, then 6 mg x 2 weeks then 3 mg x 2 weeks and then stop)  -- Labs (Cr, CRP, LFTs and CBC) every 6 months  -- Let us know if you symptoms change or worsen    2. Constipation - well managed with PRN miralax.    3. History of recurrent c diff - C.Diff was  negative with stool studies 10/30/22.      IBD HISTORY  [age] at diagnosis[age] (2019)  Extent of disease: right sided colitis  Disease phenotype: inflammatory  Melly-anal disease: none  Current CD medications:  -Apriso 1500mg daily  Prior IBD surgeries: none  Prior IBD Medications:  -prednisone taper 2/2020    DRUG MONITORING  TPMT enzyme activity:     6-TGN/6-MMPN levels:    Biologic concentration:    DISEASE ASSESSMENT  Labs  Recent Labs   Lab Test 10/29/22  1215 10/29/22  1214 08/26/22  1318   CRP  --  3.93 3.55   SED 8  --  8     Fecal calprotectin: 170 (10/30/22); 74 (2/2022)  Endoscopy: Colonoscopy 2/14/22 - CDES - 1. Mild erythema at appendiceal orifice. Otherwise totally normal colon  Enterography: MRE normal 3/322  C diff: negative 10/30/22  -July 2020, Oct 2020, Dec 2020 - then did 6 weeks vanco and none since    sIBDQ:   IBDQ Score Date IBDQ - Total Score  (Higher score better)   5/19/2022 61   1/25/2022 61       IBD Health Care Maintenance:    Vaccinations:  All patients on biologics should avoid live vaccines.    -- Influenza (every year)  -- TdaP (every 10 years)  -- Pneumococcal Pneumonia (once plus booster at 5 years)  -- Yearly assessment for latent Tb (verbal screening and exam, PPD or QuantiFERON-Tb testing)     One time confirmation of immunity or serologies:  -- Hepatitis A (serologies or immunizations)  -- Hepatitis B (serologies or immunizations)  -- Varicella  -- MMR  -- HPV (all aged 18-26)  -- Meningococcal meningitis (all patients at risk for meningitis)  -- Not immunosuppressed - can get all live vaccines     Bone mineral density screening   -- Recommend all patients supplement with calcium and vitamin D  -- Given prior steroid use recommend DEXA if not already done     Cancer Screening:  Colon cancer screening:  Given right sided colitis, recommend patient undergo regular dysplasia surveillance   Next dysplasia screening is recommended 2027.     Cervical cancer screening: Per  OBGYN     Skin cancer screening: Not needed given no immunosuppression at this time    Depression Screening:  -- Over the last month, have you felt down, depressed, or hopeless? no  -- Over the last month, have you felt little interest or pleasure doing things? no    Misc:  -- Avoid tobacco use  -- Avoid NSAIDs as there is potentially a 25% chance of causing an IBD flare    Return to clinic in 3 months with Shanae, 6 months with Dr. Jacobs    Thank you for this consultation.  It was a pleasure to participate in the care of this patient; please contact us with any further questions.      This note was created with voice recognition software, and while reviewed for accuracy, typos may remain.    55 Minutes was spent on the date of the encounter during chart review, history and exam, documentation, and further activities as noted     Shanae Swanson PA-C  Division of Gastroenterology, Hepatology & Nutrition  [facility]        HPI:   Arleen is here today for follow up with recent increasing symptoms.    In early October patient started having increasing symptoms - urgent bowel movements 3x/day, soft but not liquid, occasional small amount of blood in stool, + tenesmus and night time awakening. She had increasing RLQ abdominal pain. She also experienced increasing fatigue and joint pain in hands and knees.  Labs and stool studies completed 10/30/22 which showed fecal calprotectin elevated to 170. Infectious stool studies negative.Other labs including CRP, ESR, Albumin, and Hgb normal. Entocort started 11/4/22 after fecal calprotectin resulted.     Today she reports significant improvement in symptoms since starting Entocort. Bowel pattern has returned to normal, RLQ pain has improved but still has tenderness. She also notes improvement in fatigue but still gets tired quickly.Joint pan resolved.    Current BM Pattern - 1 stool per day, formed. No blood. No urgency. No nighttime awakening. No tenesmus. Normally  more constipation. Miralax as needed but hasn't needed recently.     Some abdominal tenderness occasionally, slightly tender to palpation. No bloating, reflux, no nausea, no vomiting.    No fevers or chills.     Notes increased dry eye. Bump on left side flank. Felt like burst in shower. No EIM (joint pain resolved)    Avoiding NSAIDs. No tobacco.    Feels like she lost a little bit of weight recently because her pants feel a little looser. Per review, current weight similar to 6 months ago.  Wt Readings from Last 5 Encounters:   11/22/22 69 kg (152 lb 3.2 oz)   05/19/22 68 kg (150 lb)   02/14/22 68 kg (150 lb)     Lestre Robles Index:  General well-being: slightly below average = 1  Abdominal pain: Mild = 1  Number of liquid stools per day: 0  Abdominal mass: None = 0  Current Complications: none    ROS:  Complete 10 System ROS performed. All are negative except as documented below, in the HPI, or in patient questionnaire from today's visit.  No fevers or chills  No weight loss  No blurry vision, double vision or change in vision  No sore throat  No lymphadenopathy  No headache, paraesthesias, or weakness in a limb  No shortness of breath or wheezing  No chest pain or pressure  No arthralgias or myalgias  No rashes or skin changes  No odynophagia or dysphagia  No BRBPR, hematochezia, melena  No dysuria, frequency or urgency  No hot/cold intolerance or polyria  No anxiety or depression    Extra intestinal manifestations of IBD:  No uveitis/episcleritis  No aphthous ulcers   No arthritis   No erythema nodosum/pyoderma gangrenosum.     ROS:    No fevers or chills  No weight loss  No blurry vision, double vision or change in vision  No sore throat  No lymphadenopathy  No headache, paraesthesias, or weakness in a limb  No shortness of breath or wheezing  No chest pain or pressure  No arthralgias or myalgias  No rashes or skin changes  No odynophagia or dysphagia  No BRBPR, hematochezia, melena  No dysuria, frequency  or urgency  No hot/cold intolerance or polyria  No anxiety or depression    Extra intestinal manifestations of IBD:  No uveitis/episcleritis  No aphthous ulcers   No arthritis   No erythema nodosum/pyoderma gangrenosum.     PERTINENT PAST MEDICAL HISTORY:  No past medical history on file.    PREVIOUS SURGERIES:  Past Surgical History:   Procedure Laterality Date     COLONOSCOPY N/A 2/14/2022    Procedure: COLONOSCOPY, WITH  BIOPSY;  Surgeon: Luis Alberto Jacobs MD;  Location: [facility] OR     PREVIOUS ENDOSCOPY:  Results for orders placed or performed during the hospital encounter of 02/14/22   COLONOSCOPY   Result Value Ref Range    COLONOSCOPY       [facility]  [address] [place], [place] [address]., [place] [address] [phone]     Endoscopy Department  _______________________________________________________________________________  Patient Name: Arleen Owusu              Procedure Date: 2/14/2022 11:30 AM  MRN: [id number]                       Account Number: [id number]  YOB: 1995             Admit Type: Outpatient  Age: 26                               Room: Pro 3  Gender: Female                        Note Status: Finalized  Attending MD: Luis Alberto Jacobs MD    Total Sedation Time:   _______________________________________________________________________________     Procedure:             Colonoscopy  Indications:           Crohn's disease of the colon, Doing well. Asymptomatic                          on Apriso daily. Here to assess disease activity.  Providers:             Luis Alberto Jacobs MD, Angelita Guzman CRNA, Maite Goldman CRNA  Referring MD:          Luis Alberto Jacobs MD  Requesting Provider:   Luis Alberto Jacobs MD  Medicines:             Monitored Anesthesia Care  Complications:         No immediate complications.  _______________________________________________________________________________  Procedure:             Pre-Anesthesia  Assessment:                         - See EPIC H and P note                         After obtaining informed consent, the colonoscope was                          passed under direct vision. Throughout the procedure,                          the patient's blood pressure, pulse, and oxygen                          saturations were monitored continuously. The                          Colonoscope was introduced through the anus and                          advanced to the cecum, identified by appendiceal                          orifice and ileocecal valve. The colonoscopy was                          performed without difficulty. The patient tolerated                          the procedure well. The quality of the  bowel                          preparation was fair.                                                                                   Findings:       Skin tags were found on perianal exam.       The terminal ileum appeared normal.       A localized area of mildly erythematous mucosa was found at the        appendiceal orifice. Biopsies were taken with a cold forceps for        histology.       The Simple Endoscopic Score for Crohn's Disease was determined based on        the endoscopic appearance of the mucosa in the following segments:       - Ileum: Findings include no ulcers present, no ulcerated surfaces, no        affected surfaces and no narrowings. Segment score: 0.       - Right Colon: Findings include no ulcers present, no ulcerated        surfaces, less than 50% of surfaces affected and no narrowings. Segment        score: 1.       - Transverse Colon: Findings include no ulcers present, no ulcerated        surfaces, no affected surfaces and no narrowings. Segment score: 0.        - Left Colon: Findings include no ulcers present, no ulcerated surfaces,        no affected surfaces and no narrowings. Segment score: 0.       - Rectum: Findings include no ulcers present, no ulcerated surfaces, no         affected surfaces and no narrowings. Segment score: 0.       - Total SES-CD aggregate score: 1. Biopsies were taken with a cold        forceps for histology. Separate biopsies taken of ascending colon,        transverse colon, descending/sigmoid colon and rectum and placed in        separate jar.                                                                                   Impression:            - Preparation of the colon was fair.                         - Perianal skin tags found on perianal exam.                         - The examined portion of the ileum was normal.                         - Mildly erythematous mucosa at the appendiceal                          orifice. Biopsied.                         - Simple Endoscopic Score for Crohn's Disease: 1,                           mucosal inflammatory changes secondary to Crohn's                          disease, in remission. Biopsied.                         Overall she has mucosal healing. Very mild erythema at                          the appendiceal orifice noted, but not significant at                          this time.  Recommendation:        - Discharge patient to home (with escort).                         - Resume previous diet.                         - Continue present medications.                         - Await pathology results.                         - Repeat colonoscopy in 5 years for surveillance.                         - Return to GI office.                                                                                       ____________________  Luis Alberto Jacobs MD  2/14/2022 12:27:19 PM  I was physically present for the entire viewing portion of the exam.  __________________________  Signature of teaching physician  Luis Alberto Jacobs MD  Number of Addenda: 0    No te Initiated On: 2/14/2022 11:30 AM  Scope In:  Scope Out:         ALLERGIES:     Allergies   Allergen Reactions     Seasonale Headache, Hives and Itching     Latex Rash        PERTINENT MEDICATIONS:    Current Outpatient Medications:      budesonide (ENTOCORT EC) 3 MG EC capsule, Take 3 capsules (9 mg) by mouth every morning for 42 days, THEN 2 capsules (6 mg) every morning for 14 days, THEN 1 capsule (3 mg) every morning for 14 days., Disp: 168 capsule, Rfl: 0     buPROPion (WELLBUTRIN) 100 MG tablet, Take 1 tablet (100 mg) by mouth daily, Disp: 90 tablet, Rfl: 3     mesalamine (APRISO ER) 0.375 g 24 hr capsule, Take 4 capsules (1.5 g) by mouth daily More refills at next clinic visit, Disp: 120 capsule, Rfl: 1     sertraline (ZOLOFT) 100 MG tablet, Take 1 tablet (100 mg) by mouth 2 times daily, Disp: 180 tablet, Rfl: 3    Current Facility-Administered Medications:      medroxyPROGESTERone (DEPO-PROVERA) injection 150 mg, 150 mg, Intramuscular, Q90 Days, Dilan Dan APRN CNP, 150 mg at 06/20/22 0906    SOCIAL HISTORY:  Social History     Socioeconomic History     Marital status: Single     Spouse name: Not on file     Number of children: Not on file     Years of education: Not on file     Highest education level: Not on file   Occupational History     Not on file   Tobacco Use     Smoking status: Never     Smokeless tobacco: Never   Substance and Sexual Activity     Alcohol use: Not on file     Drug use: Not on file     Sexual activity: Not on file   Other Topics Concern     Not on file   Social History Narrative     Not on file     Social Determinants of Health     Financial Resource Strain: Not on file   Food Insecurity: Not on file   Transportation Needs: Not on file   Physical Activity: Not on file   Stress: Not on file   Social Connections: Not on file   Intimate Partner Violence: Not on file   Housing Stability: Not on file       FAMILY HISTORY:  No family history on file.    Past/family/social history reviewed and no changes    PHYSICAL EXAMINATION:  Constitutional: aaox3, cooperative, pleasant, not dyspneic/diaphoretic, no acute distress  Vitals reviewed: There were no  vitals taken for this visit.  Wt:   Wt Readings from Last 2 Encounters:   05/19/22 68 kg (150 lb)   02/14/22 68 kg (150 lb)      Eyes: Sclera anicteric/injected  Respiratory: Unlabored breathing  Skin: no jaundice  Psych: Normal affect      PERTINENT STUDIES:  Most recent CBC:  Recent Labs   Lab Test 10/29/22  1214 08/26/22  1318   WBC 6.2 7.3   HGB 13.1 13.7   HCT 38.5 41.3    271     Most recent hepatic panel:  Recent Labs   Lab Test 10/29/22  1213 08/26/22  1318   ALT 25 16   AST 19 25     Most recent creatinine:  Recent Labs   Lab Test 10/29/22  1213 08/26/22  1318   CR 0.70 0.77     Answers for HPI/ROS submitted by the patient on 11/21/2022  General Symptoms: Yes  Skin Symptoms: Yes  HENT Symptoms: No  EYE SYMPTOMS: Yes  HEART SYMPTOMS: Yes  LUNG SYMPTOMS: No  INTESTINAL SYMPTOMS: Yes  URINARY SYMPTOMS: No  GYNECOLOGIC SYMPTOMS: No  BREAST SYMPTOMS: No  SKELETAL SYMPTOMS: Yes  BLOOD SYMPTOMS: No  NERVOUS SYSTEM SYMPTOMS: No  MENTAL HEALTH SYMPTOMS: Yes  Fever: No  Loss of appetite: Yes  Weight loss: Yes  Weight gain: No  Fatigue: Yes  Night sweats: No  Chills: No  Increased stress: Yes  Excessive hunger: No  Excessive thirst: No  Feeling hot or cold when others believe the temperature is normal: No  Loss of height: No  Post-operative complications: No  Surgical site pain: No  Hallucinations: No  Change in or Loss of Energy: Yes  Hyperactivity: No  Confusion: No  Changes in hair: No  Changes in moles/birth marks: Yes  Itching: No  Rashes: No  Changes in nails: No  Acne: Yes  Hair in places you don't want it: No  Change in facial hair: No  Warts: No  Non-healing sores: No  Scarring: No  Flaking of skin: No  Color changes of hands/feet in cold : No  Sun sensitivity: No  Skin thickening: No  Eye pain: No  Vision loss: No  Dry eyes: Yes  Watery eyes: No  Eye bulging: No  Double vision: No  Flashing of lights: No  Spots: No  Floaters: No  Redness: Yes  Crossed eyes: No  Tunnel Vision: No  Yellowing of eyes:  "No  Eye irritation: Yes  Chest pain or pressure: No  Fast or irregular heartbeat: Yes  Pain in legs with walking: No  Trouble breathing while lying down: No  Fingers or toes appear blue: No  High blood pressure: No  Low blood pressure: No  Fainting: No  Murmurs: No  Pacemaker: No  Varicose veins: No  Edema or swelling: No  Wake up at night with shortness of breath: No  Light-headedness: No  Exercise intolerance: No  Heart burn or indigestion: No  Nausea: Yes  Vomiting: No  Abdominal pain: Yes  Bloating: Yes  Constipation: No  Diarrhea: No  Blood in stool: Yes  Black stools: No  Rectal or Anal pain: No  Fecal incontinence: No  Yellowing of skin or eyes: No  Vomit with blood: No  Change in stools: Yes  Back pain: No  Muscle aches: Yes  Neck pain: No  Swollen joints: No  Joint pain: Yes  Bone pain: No  Muscle cramps: No  Muscle weakness: No  Joint stiffness: Yes  Bone fracture: No  Nervous or Anxious: No  Depression: Yes  Trouble sleeping: No  Trouble thinking or concentrating: Yes  Mood changes: No  Panic attacks: No            Chief Complaint   Patient presents with     Follow Up       Vitals:    11/22/22 0803   BP: 100/62   BP Location: Left arm   Patient Position: Left side   Cuff Size: Adult Regular   Pulse: 79   SpO2: 96%   Weight: 69 kg (152 lb 3.2 oz)   Height: 1.727 m (5' 8\")       Body mass index is 23.14 kg/m .     Not taking Depo; taking calcium supplement daily (500 mg).     Timmy Márquez        Again, thank you for allowing me to participate in the care of your patient.      Sincerely,    Shanae Swanson PA-C    "

## 2022-11-22 NOTE — PATIENT INSTRUCTIONS
It was a pleasure taking care of you today.  I've included a brief summary of our discussion and care plan from today's visit below.  Please review this information with your primary care provider.  ______________________________________________________________________    My recommendations are summarized as follows:  -- Schedule colonoscopy [phone].  -- Continue Apriso 1.5 grams daily  -- Continue Entocort with taper (9 mg x 6 weeks, then 6 mg x 2 weeks then 3 mg x 2 weeks and then stop)  -- Get labs done    -- please see scheduling information provided below     Return to GI Clinic in 3 months to review your progress.    ______________________________________________________________________    How do I schedule labs, imaging studies, or procedures that were ordered in clinic today?     Labs: To schedule lab appointment at the [facility], use my chart or call [phone]. If you have a [facility] lab closer to home where you are regularly seen you can give them a call.     Procedures: If a colonoscopy, upper endoscopy, breath test, esophageal manometry, or pH impedence was ordered today, our endoscopy team will call you to schedule this. If you have not heard from our endoscopy team within a week, please call [phone] to schedule.     Imaging Studies: If you were scheduled for a CT scan, X-ray, MRI, ultrasound, HIDA scan or other imaging study, please call [phone] to have this scheduled.     Referral: If a referral to another specialty was ordered, expect a phone call or follow instructions above. If you have not heard from anyone regarding your referral in a week, please call our clinic to check the status.     Who do I call with any questions after my visit?  Please be in touch if there are any further questions that arise following today's visit.  There are multiple ways to contact your gastroenterology care team.      During business hours, you may reach a Gastroenterology nurse  at [phone]    To schedule or reschedule an appointment, please call [phone].     You can always send a secure message through PlatformQ.  PlatformQ messages are answered by your nurse or doctor typically within 24 hours.  Please allow extra time on weekends and holidays.      For urgent/emergent questions after business hours, you may reach the on-call GI Fellow by contacting the [facility]  at [phone].     How will I get the results of any tests ordered?    You will receive all of your results.  If you have signed up for [employer]xEyet, any tests ordered at your visit will be available to you after your provider reviews them.  Typically this takes 1-2 weeks.  If there are urgent results that require a change in your care plan, your provider or nurse will call you to discuss the next steps.      What is PlatformQ?  PlatformQ is a secure way for you to access all of your healthcare records from the [facility].  It is a web based computer program, so you can sign on to it from any location.  It also allows you to send secure messages to your care team.  I recommend signing up for PlatformQ access if you have not already done so and are comfortable with using a computer.      How to I schedule a follow-up visit?  If you did not schedule a follow-up visit today, please call [phone] to schedule a follow-up office visit.      Sincerely,    Shanae Swanson PA-C  Division of Gastroenterology, Hepatology & Nutrition  [facility]

## 2022-11-23 ENCOUNTER — TELEPHONE (OUTPATIENT)
Dept: GASTROENTEROLOGY | Facility: CLINIC | Age: 27
End: 2022-11-23

## 2022-11-23 NOTE — TELEPHONE ENCOUNTER
[facility] and [facility]    Schedule a follow up appt with Shanae Swanson in 3 months (around 2/22/23) and with Dr. Jacobs in 6 months (around 5/22/23)

## 2022-12-20 ENCOUNTER — PATIENT OUTREACH (OUTPATIENT)
Dept: GASTROENTEROLOGY | Facility: CLINIC | Age: 27
End: 2022-12-20

## 2022-12-20 DIAGNOSIS — K50.10 CROHN'S DISEASE OF COLON WITHOUT COMPLICATION (H): ICD-10-CM

## 2022-12-20 RX ORDER — MESALAMINE 0.38 G/1
1.5 CAPSULE, EXTENDED RELEASE ORAL DAILY
Qty: 120 CAPSULE | Refills: 1 | OUTPATIENT
Start: 2022-12-20

## 2022-12-20 RX ORDER — MESALAMINE 0.38 G/1
1.5 CAPSULE, EXTENDED RELEASE ORAL DAILY
Qty: 120 CAPSULE | Refills: 2 | Status: SHIPPED | OUTPATIENT
Start: 2022-12-20 | End: 2022-12-21

## 2022-12-20 NOTE — TELEPHONE ENCOUNTER
Earlier today I place a mesalamine refill through the [facility] service. I would like to cancel that refill. Could I instead have the mesalamine sent to [id number] - [place], [place]? I have added the pharmacy to my list      Refill sent to [facility].

## 2022-12-21 DIAGNOSIS — K50.10 CROHN'S DISEASE OF COLON WITHOUT COMPLICATION (H): ICD-10-CM

## 2022-12-21 RX ORDER — MESALAMINE 0.38 G/1
1.5 CAPSULE, EXTENDED RELEASE ORAL DAILY
Qty: 120 CAPSULE | Refills: 2 | Status: SHIPPED | OUTPATIENT
Start: 2022-12-21 | End: 2023-01-18

## 2022-12-27 ENCOUNTER — PATIENT OUTREACH (OUTPATIENT)
Dept: GASTROENTEROLOGY | Facility: CLINIC | Age: 27
End: 2022-12-27

## 2022-12-27 NOTE — TELEPHONE ENCOUNTER
Attempted to contact the patient to assist with scheduling colonoscopy for sometime between March-May, per Dr. Jacobs's recommendation. No answer, left detailed message.

## 2023-01-12 ENCOUNTER — TELEPHONE (OUTPATIENT)
Dept: GASTROENTEROLOGY | Facility: CLINIC | Age: 28
End: 2023-01-12

## 2023-01-12 NOTE — TELEPHONE ENCOUNTER
"    Screening Questions  BLUE  KIND OF PREP RED  LOCATION [review exclusion criteria] GREEN  SEDATION TYPE        y Are you active on mychart?       Reynaldo Ordering/Referring Provider?        [employer] What type of coverage do you have?      n Have you had a positive covid test in the last 14 days?     22.8 1. BMI  [BMI 40+ - review exclusion criteria]    y  2. Are you able to give consent for your medical care? [IF NO,RN REVIEW]          n  3. Are you taking any prescription pain medications on a routine schedule   (ex narcotics: tramadol, oxycodone, roxicodone, oxycontin,  and percocet)?        n  3a. EXTENDED PREP What kind of prescription?     n 4. Do you have any chemical dependencies such as alcohol, street drugs, or methadone?        **If yes 3- 5 , please schedule with MAC sedation.**          IF YES TO ANY 6 - 10 - HOSPITAL SETTING ONLY.     n 6.   Do you need assistance transferring?     n 7.   Have you had a heart or lung transplant?    n 8.   Are you currently on dialysis?   n 9.   Do you use daily home oxygen?   n 10. Do you take nitroglycerin?   10a. n If yes, how often?     11. [FEMALES]  n Are you currently pregnant?    11a. n If yes, how many weeks? [ Greater than 12 weeks, OR NEEDED]    n 12. Do you have Pulmonary Hypertension? *NEED PAC APPT AT [facility]*     n 13. [review exclusion criteria]  Do you have any implantable devices in your body (pacemaker, defib, LVAD)?    n 14. In the past 6 months, have you had any heart related issues including cardiomyopathy or heart attack?     14a. n If yes, did it require cardiac stenting if so when?     n 15. Have you had a stroke or Transient ischemic attack (TIA - aka  mini stroke ) within 6 months?      n 16. Do you have mod to severe Obstructive Sleep Apnea?  [Hospital only]    n 17. Do you have SEVERE AND UNCONTROLLED asthma? *NEED PAC APPT AT [facility]*     n 18. Are you currently taking any blood thinners?     18a. If yes, inform patient to \"follow up w/ ordering " "provider for bridging instructions.\"    n 19. Do you take the medication Phentermine?    19a. If yes, \"Hold for 7 days before procedure.  Please consult your prescribing provider if you have questions about holding this medication.\"     n  20. Do you have chronic kidney disease?      n  21. Do you have a diagnosis of diabetes?     n  22. On a regular basis do you go 3-5 days between bowel movements?      23. Preferred LOCAL Pharmacy for Pre Prescription    [ LIST ONLY ONE PHARMACY]          [address] REMINDERS -    Informed patient they will need an adult    Cannot take any type of public or medical transportation alone    Conscious Sedation- Needs  for 6 hours after the procedure       MAC/General-Needs  for 24 hours after procedure    Pre-Procedure Covid test to be completed [[facility] PCR Testing Required]    Confirmed Nurse will call to complete assessment       - SCHEDULING DETAILS -  n Hospital Setting Required? If yes, what is the exclusion?:    Reynaldo  Surgeon    4/3  Date of Procedure  Lower Endoscopy [Colonoscopy]  Type of Procedure Scheduled  [facility]-If you answer yes to questions #8, #20, #21Which Colonoscopy Prep was Sent?     CD Sedation Type     n PAC / Pre-op Required                 "

## 2023-01-18 ENCOUNTER — OFFICE VISIT (OUTPATIENT)
Dept: OBGYN | Facility: CLINIC | Age: 28
End: 2023-01-18
Attending: NURSE PRACTITIONER
Payer: COMMERCIAL

## 2023-01-18 VITALS
WEIGHT: 152 LBS | SYSTOLIC BLOOD PRESSURE: 101 MMHG | HEART RATE: 87 BPM | BODY MASS INDEX: 23.04 KG/M2 | DIASTOLIC BLOOD PRESSURE: 69 MMHG | HEIGHT: 68 IN

## 2023-01-18 DIAGNOSIS — Z30.430 ENCOUNTER FOR INSERTION OF INTRAUTERINE CONTRACEPTIVE DEVICE: ICD-10-CM

## 2023-01-18 DIAGNOSIS — Z30.430 ENCOUNTER FOR IUD INSERTION: Primary | ICD-10-CM

## 2023-01-18 LAB
HCG UR QL: NEGATIVE
INTERNAL QC OK POCT: NORMAL
POCT KIT EXPIRATION DATE: NORMAL
POCT KIT LOT NUMBER: NORMAL

## 2023-01-18 PROCEDURE — 81025 URINE PREGNANCY TEST: CPT | Performed by: MIDWIFE

## 2023-01-18 PROCEDURE — 58300 INSERT INTRAUTERINE DEVICE: CPT | Performed by: MIDWIFE

## 2023-01-18 PROCEDURE — 250N000011 HC RX IP 250 OP 636: Performed by: MIDWIFE

## 2023-01-18 RX ORDER — FLUTICASONE PROPIONATE 50 MCG
1-2 SPRAY, SUSPENSION (ML) NASAL 2 TIMES DAILY PRN
COMMUNITY
Start: 2022-06-16

## 2023-01-18 RX ADMIN — LEVONORGESTREL 19.5 MG: 19.5 INTRAUTERINE DEVICE INTRAUTERINE at 15:12

## 2023-01-18 ASSESSMENT — ENCOUNTER SYMPTOMS
NAUSEA: 1
SINUS PAIN: 1
VOMITING: 0
BLOOD IN STOOL: 0
SORE THROAT: 1
CONSTIPATION: 0
DECREASED CONCENTRATION: 1
ABDOMINAL PAIN: 1
TASTE DISTURBANCE: 0
JAUNDICE: 0
PANIC: 0
NERVOUS/ANXIOUS: 0
NECK MASS: 0
BLOATING: 0
HOT FLASHES: 0
SINUS CONGESTION: 1
SMELL DISTURBANCE: 0
DEPRESSION: 1
BOWEL INCONTINENCE: 0
HOARSE VOICE: 1
DECREASED LIBIDO: 0
HEARTBURN: 0
INSOMNIA: 1
TROUBLE SWALLOWING: 1
RECTAL PAIN: 0
DIARRHEA: 0

## 2023-01-18 ASSESSMENT — ANXIETY QUESTIONNAIRES
3. WORRYING TOO MUCH ABOUT DIFFERENT THINGS: NOT AT ALL
5. BEING SO RESTLESS THAT IT IS HARD TO SIT STILL: NOT AT ALL
8. IF YOU CHECKED OFF ANY PROBLEMS, HOW DIFFICULT HAVE THESE MADE IT FOR YOU TO DO YOUR WORK, TAKE CARE OF THINGS AT HOME, OR GET ALONG WITH OTHER PEOPLE?: SOMEWHAT DIFFICULT
4. TROUBLE RELAXING: SEVERAL DAYS
IF YOU CHECKED OFF ANY PROBLEMS ON THIS QUESTIONNAIRE, HOW DIFFICULT HAVE THESE PROBLEMS MADE IT FOR YOU TO DO YOUR WORK, TAKE CARE OF THINGS AT HOME, OR GET ALONG WITH OTHER PEOPLE: SOMEWHAT DIFFICULT
6. BECOMING EASILY ANNOYED OR IRRITABLE: SEVERAL DAYS
GAD7 TOTAL SCORE: 3
7. FEELING AFRAID AS IF SOMETHING AWFUL MIGHT HAPPEN: SEVERAL DAYS
2. NOT BEING ABLE TO STOP OR CONTROL WORRYING: NOT AT ALL
7. FEELING AFRAID AS IF SOMETHING AWFUL MIGHT HAPPEN: SEVERAL DAYS
1. FEELING NERVOUS, ANXIOUS, OR ON EDGE: NOT AT ALL
GAD7 TOTAL SCORE: 3

## 2023-01-18 NOTE — PROGRESS NOTES
"  IUD Insertion:  CONSULT:    Is a pregnancy test required: Yes.  Was it positive or negative?  Negative  Was a consent obtained?  Yes    Subjective: Arleen Owusu is a 27 year old No obstetric history on file. presents for IUD and desires Kyleena type IUD.    Patient has been given the opportunity to ask questions about all forms of birth control, including all options appropriate for Arleen Owusu. Discussed that no method of birth control, except abstinence is 100% effective against pregnancy or sexually transmitted infection. After discussion of r/b/possible side effects of Ramonita vs Kyleena vs Mirena, pt chooses Kyleena. Pt hoping to avoid worsening acne with slightly lower dose.     Arleen Owusu understands she may have the IUD removed at any time. IUD should be removed by a health care provider.    The entire insertion procedure was reviewed with the patient, including care after placement.    Patient's last menstrual period was 01/05/2023 (approximate). Last sexual activity: more than two weeks . No allergy to betadine or shellfish.   STI screen recommended, but declined today. Reports chlamydia and gonorrhea screening after last sexual partner. No recent partners.     HCG Qual Urine   Date Value Ref Range Status   02/14/2022 Negative Negative Final     hCG Urine Qualitative   Date Value Ref Range Status   06/20/2022 Negative Negative Final     Comment:     This test is for screening purposes.  Results should be interpreted along with the clinical picture.  Confirmation testing is available if warranted by ordering [id number], HCG Quantitative Pregnancy.     /69 (BP Location: Left arm, Patient Position: Sitting, Cuff Size: Adult Regular)   Pulse 87   Ht 1.727 m (5' 8\")   Wt 68.9 kg (152 lb)   LMP 01/05/2023 (Approximate)   BMI 23.11 kg/m      Pelvic Exam:   EG/BUS: normal genital architecture without lesions, erythema or abnormal secretions.   Vagina: moist, pink, rugae with physiologic discharge and " secretions  Cervix: nulliparous no lesions and pink, moist, closed, without lesion or CMT  Uterus: anteverted position, mobile, no pain  Adnexa: within normal limits and no masses, nodularity, tenderness    PROCEDURE NOTE: -- IUD Insertion    Reason for Insertion: contraception    Did not take preprocedure pain medicaiton. Declines acetaminophen  Under sterile technique, cervix was visualized with speculum and prepped with Betadine solution swab x 3. Tenaculum was placed for stability. The uterus was gently straightened and sounded to 7.0 cm. IUD prepared for placement, and IUD inserted according to 's instructions without difficulty or significant resitance, and deployed at the fundus. The strings were visualized and trimmed to 3.0 cm from the external os. Tenaculum was removed and hemostasis noted. Speculum removed.  Patient tolerated procedure well.    Lot # [id number]  Exp: 3/1/2024    EBL: minimal    Complications: none    ASSESSMENT:     ICD-10-CM    1. Encounter for IUD insertion  Z30.430 hCG qual urine POCT      2. Encounter for insertion of intrauterine contraceptive device  Z30.430            PLAN:    Given 's handouts, including when to have IUD removed, list of danger s/sx, side effects and follow up recommended. Encouraged condom use for prevention of STD. Back up contraception advised for 7 days if progestin method. Advised to call for any fever, for prolonged or severe pain or bleeding, abnormal vaginal discharge, or unable to palpate strings. She was advised to use pain medications (ibuprofen) as needed for mild to moderate pain. Advised to follow-up in clinic in 4-6 weeks for IUD string check if unable to find strings or as directed by provider.     GLORIA Yousif CNM

## 2023-01-18 NOTE — LETTER
1/18/2023       RE: Arleen Owusu  [address]  [place] [address]     Dear Colleague,    Thank you for referring your patient, Arleen Owusu, to the [facility] [place] at [facility]. Please see a copy of my visit note below.      IUD Insertion:  CONSULT:    Is a pregnancy test required: Yes.  Was it positive or negative?  Negative  Was a consent obtained?  Yes    Subjective: Arleen Owusu is a 27 year old No obstetric history on file. presents for IUD and desires Kyleena type IUD.    Patient has been given the opportunity to ask questions about all forms of birth control, including all options appropriate for Arleen Owusu. Discussed that no method of birth control, except abstinence is 100% effective against pregnancy or sexually transmitted infection. After discussion of r/b/possible side effects of Ramonita vs Kyleena vs Mirena, pt chooses Kyleena. Pt hoping to avoid worsening acne with slightly lower dose.     Arleen Owusu understands she may have the IUD removed at any time. IUD should be removed by a health care provider.    The entire insertion procedure was reviewed with the patient, including care after placement.    Patient's last menstrual period was 01/05/2023 (approximate). Last sexual activity: more than two weeks . No allergy to betadine or shellfish.   STI screen recommended, but declined today. Reports chlamydia and gonorrhea screening after last sexual partner. No recent partners.     HCG Qual Urine   Date Value Ref Range Status   02/14/2022 Negative Negative Final     hCG Urine Qualitative   Date Value Ref Range Status   06/20/2022 Negative Negative Final     Comment:     This test is for screening purposes.  Results should be interpreted along with the clinical picture.  Confirmation testing is available if warranted by ordering [id number], HCG Quantitative Pregnancy.     /69 (BP Location: Left arm, Patient Position:  "Sitting, Cuff Size: Adult Regular)   Pulse 87   Ht 1.727 m (5' 8\")   Wt 68.9 kg (152 lb)   LMP 01/05/2023 (Approximate)   BMI 23.11 kg/m      Pelvic Exam:   EG/BUS: normal genital architecture without lesions, erythema or abnormal secretions.   Vagina: moist, pink, rugae with physiologic discharge and secretions  Cervix: nulliparous no lesions and pink, moist, closed, without lesion or CMT  Uterus: anteverted position, mobile, no pain  Adnexa: within normal limits and no masses, nodularity, tenderness    PROCEDURE NOTE: -- IUD Insertion    Reason for Insertion: contraception    Did not take preprocedure pain medicaiton. Declines acetaminophen  Under sterile technique, cervix was visualized with speculum and prepped with Betadine solution swab x 3. Tenaculum was placed for stability. The uterus was gently straightened and sounded to 7.0 cm. IUD prepared for placement, and IUD inserted according to 's instructions without difficulty or significant resitance, and deployed at the fundus. The strings were visualized and trimmed to 3.0 cm from the external os. Tenaculum was removed and hemostasis noted. Speculum removed.  Patient tolerated procedure well.    Lot # [id number]  Exp: 3/1/2024    EBL: minimal    Complications: none    ASSESSMENT:     ICD-10-CM    1. Encounter for IUD insertion  Z30.430 hCG qual urine POCT      2. Encounter for insertion of intrauterine contraceptive device  Z30.430            PLAN:    Given 's handouts, including when to have IUD removed, list of danger s/sx, side effects and follow up recommended. Encouraged condom use for prevention of STD. Back up contraception advised for 7 days if progestin method. Advised to call for any fever, for prolonged or severe pain or bleeding, abnormal vaginal discharge, or unable to palpate strings. She was advised to use pain medications (ibuprofen) as needed for mild to moderate pain. Advised to follow-up in clinic in 4-6 weeks for " IUD string check if unable to find strings or as directed by provider.     GLORIA YousifM

## 2023-02-03 ENCOUNTER — MYC MEDICAL ADVICE (OUTPATIENT)
Dept: GASTROENTEROLOGY | Facility: CLINIC | Age: 28
End: 2023-02-03
Payer: COMMERCIAL

## 2023-02-07 ENCOUNTER — MYC MEDICAL ADVICE (OUTPATIENT)
Dept: GASTROENTEROLOGY | Facility: CLINIC | Age: 28
End: 2023-02-07
Payer: COMMERCIAL

## 2023-02-07 DIAGNOSIS — K50.10 CROHN'S DISEASE OF COLON WITHOUT COMPLICATION (H): ICD-10-CM

## 2023-02-07 NOTE — CONFIDENTIAL NOTE
LAMONT scanned to patient's email, per patient request: [email or web address]. Notified patient via [employer].

## 2023-02-08 RX ORDER — MESALAMINE 0.38 G/1
1.5 CAPSULE, EXTENDED RELEASE ORAL DAILY
Qty: 120 CAPSULE | Refills: 2 | Status: SHIPPED | OUTPATIENT
Start: 2023-02-08 | End: 2023-10-16

## 2023-02-08 NOTE — CONFIDENTIAL NOTE
"Mesalamine refill sent. Per Shanae Swanson's note on 11/22/22:   \"Continue Apriso 1.5 grams daily.\"  Reminder sent to patient to schedule follow up appointment.   "

## 2023-02-12 ENCOUNTER — HEALTH MAINTENANCE LETTER (OUTPATIENT)
Age: 28
End: 2023-02-12

## 2023-03-14 ENCOUNTER — MYC MEDICAL ADVICE (OUTPATIENT)
Dept: FAMILY MEDICINE | Facility: CLINIC | Age: 28
End: 2023-03-14
Payer: COMMERCIAL

## 2023-03-15 NOTE — TELEPHONE ENCOUNTER
See RN response to patient's mychart message re:Indu Ontiveros BSN RN  [facility]

## 2023-03-15 NOTE — TELEPHONE ENCOUNTER
Left message to call back and ask to speak with an available triage nurse.    Need to do Paxlovid tx protocol.    Mental health is qualifying factor; MASSBP is 6    Mary Ellen SOMMER RN  [facility]

## 2023-03-16 ENCOUNTER — MYC MEDICAL ADVICE (OUTPATIENT)
Dept: SURGERY | Facility: AMBULATORY SURGERY CENTER | Age: 28
End: 2023-03-16
Payer: COMMERCIAL

## 2023-03-16 ENCOUNTER — TELEPHONE (OUTPATIENT)
Dept: GASTROENTEROLOGY | Facility: CLINIC | Age: 28
End: 2023-03-16

## 2023-03-16 ENCOUNTER — OFFICE VISIT (OUTPATIENT)
Dept: URGENT CARE | Facility: URGENT CARE | Age: 28
End: 2023-03-16
Payer: COMMERCIAL

## 2023-03-16 ENCOUNTER — NURSE TRIAGE (OUTPATIENT)
Dept: NURSING | Facility: CLINIC | Age: 28
End: 2023-03-16
Payer: COMMERCIAL

## 2023-03-16 VITALS
TEMPERATURE: 98.2 F | OXYGEN SATURATION: 97 % | RESPIRATION RATE: 20 BRPM | SYSTOLIC BLOOD PRESSURE: 101 MMHG | DIASTOLIC BLOOD PRESSURE: 70 MMHG | HEART RATE: 89 BPM

## 2023-03-16 DIAGNOSIS — U07.1 INFECTION DUE TO 2019 NOVEL CORONAVIRUS: Primary | ICD-10-CM

## 2023-03-16 DIAGNOSIS — K50.10 CROHN'S DISEASE OF COLON WITHOUT COMPLICATION (H): Primary | ICD-10-CM

## 2023-03-16 PROCEDURE — 99213 OFFICE O/P EST LOW 20 MIN: CPT | Mod: CS | Performed by: FAMILY MEDICINE

## 2023-03-16 RX ORDER — BISACODYL 5 MG/1
TABLET, DELAYED RELEASE ORAL
Qty: 4 TABLET | Refills: 0 | Status: SHIPPED | OUTPATIENT
Start: 2023-03-16 | End: 2023-05-03

## 2023-03-16 NOTE — TELEPHONE ENCOUNTER
Attempted to contact patient regarding upcoming Colonoscopy  procedure on 4/3/23 for pre assessment questions. No answer.     Left message to return call to [phone] #4    Discuss Covid policy and designated  policy.    Pre op exam? N/A    Arrival time: 1240. Procedure time: 1340    [facility] [address] [place] [address], 5[address] Floor, [place] [id number]    Sedation type: Conscious sedation     Anticoagulants: No    Electronic implanted devices? No    Diabetic? No    Indication for procedure: Crohn's    Bowel prep recommendation: Standard Golytely      Prep instructions sent via [employer]. Bowel prep script sent to      [place], [place] - [address] [place] [address] [phone]      Sheridan Doan RN  Endoscopy Procedure Pre Assessment RN

## 2023-03-16 NOTE — PATIENT INSTRUCTIONS
Ibuprofen 400mg and/or tylenol 500mg every 4 hours as needed for throat or chest discomfort/fever      Quarantine for at least 5 days      If symptoms worsen return to be evaluated

## 2023-03-16 NOTE — PROGRESS NOTES
Assessment & Plan     Infection due to 2019 novel coronavirus     Patient stable vitals at this present time first-time episode of COVID.  We did have discussion about doing the oral antiviral treatment which in her risk group spite her having ulcerative colitis but not on imaging immune modulators while being fully vaccinated the likelihood of her having hospitalization her worst outcome is very low she opted to not proceed with the oral antiviral treatment.  Symptomatic treatment recommended.  Continue quarantine for at least 5 days from onset of symptoms    Jose Jara MD   [facility]    Emma Bacon is a 27 year old female who presents to clinic today for the following health issues:  Chief Complaint   Patient presents with     Covid Concern     Mild R sided chest discomfort       HPI  Absent of cough. Sore throat present no difficulty with opening mouth or swallowing. Sick as of 3 days ago. Sibling who is roommate starting to get sick. Rapid COVID test positive twice.   She has never had COVID before. Absent of fever  Patient is not on birth control and does not smoke.  Patient is up-to-date on her COVID vaccinations most recent being on October 18, 2022 ( received bivalent)     Other than mesalamine for her ulcerative colitis she is not on any immunomodulators.  Patient Active Problem List    Diagnosis Date Noted     History of ulcerative colitis 03/03/2020     Priority: Medium     Crohn's disease of colon (H) 12/01/2019     Priority: Medium     Tailor's bunion 07/10/2012     Priority: Medium       Current Outpatient Medications   Medication     bisacodyl (DULCOLAX) 5 MG EC tablet     buPROPion (WELLBUTRIN) 100 MG tablet     fluticasone (FLONASE) 50 MCG/ACT nasal spray     levonorgestrel (KYLEENA) 19.5 MG IUD     mesalamine (APRISO ER) 0.375 g 24 hr capsule     polyethylene glycol (GOLYTELY) 236 g suspension     sertraline (ZOLOFT) 100 MG tablet     Current Facility-Administered  Medications   Medication     medroxyPROGESTERone (DEPO-PROVERA) injection 150 mg         Objective    /70 (BP Location: Right arm)   Pulse 89   Temp 98.2  F (36.8  C) (Temporal)   Resp 20   LMP 01/05/2023 (Approximate)   SpO2 97%   Physical Exam   Chest: 2nd/3rd right of sternum palpable discomfort also aggravated with resisted pectoral engagement  CV: RRR no m/r/g  Pulm: clear bilaterally  Gait: normal  GEN: NAD, non-diaphoretic. Well hydrated    No results found for any visits on 03/16/23.          The use of Dragon/[employer] dictation services may have been used to construct the content in this note; any grammatical or spelling errors are non-intentional. Please contact the author of this note directly if you are in need of any clarification.

## 2023-03-16 NOTE — TELEPHONE ENCOUNTER
"Patient informed provider agrees with either UC or ER evaluation.  Patient will try to do an \"On my way\" appointment with [facility].  Rozina Roach RN  [facility]   "

## 2023-03-16 NOTE — TELEPHONE ENCOUNTER
COVID Positive/Requesting COVID treatment    Patient is positive for COVID and requesting treatment options.    Date of positive COVID test (PCR or at home)? 3/14/2023  Current COVID symptoms: fever or chills, shortness of breath or difficulty breathing, fatigue, sore throat, nausea or vomiting and diarrhea  Date COVID symptoms began: 3/13/2023    Message should be routed to clinic RN pool. Best phone number to use for call back: [phone], OK to leave a detailed message    Care advice given for second-level triage. Call routed to ANAEYLI Olson RN  [facility]  March 16, 2023, 1:57 PM      Reason for Disposition    Chest pain or pressure  (Exception: MILD central chest pain, present only when coughing)    Additional Information    Negative: SEVERE difficulty breathing (e.g., struggling for each breath, speaks in single words)    Negative: Difficult to awaken or acting confused (e.g., disoriented, slurred speech)    Negative: Bluish (or gray) lips or face now    Negative: Shock suspected (e.g., cold/pale/clammy skin, too weak to stand, low BP, rapid pulse)    Negative: Sounds like a life-threatening emergency to the triager    Negative: [1] Diagnosed or suspected COVID-19 AND [2] symptoms lasting 3 or more weeks    Negative: [1] COVID-19 exposure AND [2] no symptoms    Negative: COVID-19 vaccine reaction suspected (e.g., fever, headache, muscle aches) occurring 1 to 3 days after getting vaccine    Negative: COVID-19 vaccine, questions about    Negative: [1] Lives with someone known to have influenza (flu test positive) AND [2] flu-like symptoms (e.g., cough, runny nose, sore throat, SOB; with or without fever)    Negative: [1] Adult with possible COVID-19 symptoms AND [2] triager concerned about severity of symptoms or other causes    Negative: COVID-19 and breastfeeding, questions about    Negative: SEVERE or constant chest pain or pressure  (Exception: Mild central chest pain,  present only when coughing.)    Negative: MODERATE difficulty breathing (e.g., speaks in phrases, SOB even at rest, pulse 100-120)    Negative: Headache and stiff neck (can't touch chin to chest)    Negative: Oxygen level (e.g., pulse oximetry) 90 percent or lower    Protocols used: CORONAVIRUS (COVID-19) DIAGNOSED OR [id number]

## 2023-03-27 NOTE — TELEPHONE ENCOUNTER
Pre assessment questions completed for upcoming Colonoscopy  procedure scheduled on 4/3/23    COVID policy reviewed.     Pre-op exam? N/A    Reviewed procedural arrival time 1240, procedure time 1340 and facility location [facility]; [address] [place] [address], [address] Floor, [place] [address]    Designated  policy reviewed. Instructed to have someone stay 6 hours post procedure.     Anticoagulation/blood thinners? No    Electronic implanted devices? No    Diabetic? No    Procedure indication: crohns     Bowel prep recommendation: Standard Golytely     Reviewed procedure prep instructions.     Patient verbalized understanding and had no questions or concerns at this time.    Mayda Torres RN  Endoscopy Procedure Pre Assessment RN

## 2023-03-31 ENCOUNTER — ANESTHESIA EVENT (OUTPATIENT)
Dept: SURGERY | Facility: AMBULATORY SURGERY CENTER | Age: 28
End: 2023-03-31
Payer: COMMERCIAL

## 2023-04-01 NOTE — ANESTHESIA PREPROCEDURE EVALUATION
Anesthesia Pre-Procedure Evaluation    Patient: Arleen Owusu   MRN: [id number] : 1995        Procedure : Procedure(s):  COLONOSCOPY          No past medical history on file.   Past Surgical History:   Procedure Laterality Date     COLONOSCOPY N/A 2022    Procedure: COLONOSCOPY, WITH  BIOPSY;  Surgeon: Luis Alberto Jacobs MD;  Location: [facility] OR      Allergies   Allergen Reactions     Ibuprofen      Pt has an autoimmune disease it causes a negative colon reaction , can tolerate tylenol     Corn Oil Unknown     Seasonale Headache, Hives and Itching     Tri-Levlen, Headache, Hives and Itching     Latex Rash      Social History     Tobacco Use     Smoking status: Never     Smokeless tobacco: Never   Substance Use Topics     Alcohol use: Not on file      Wt Readings from Last 1 Encounters:   23 68.9 kg (152 lb)           Physical Exam    Airway  airway exam normal           Respiratory Devices and Support         Dental           Cardiovascular   cardiovascular exam normal          Pulmonary   pulmonary exam normal                OUTSIDE LABS:  CBC:   Lab Results   Component Value Date    WBC 6.2 10/29/2022    WBC 7.3 2022    HGB 13.1 10/29/2022    HGB 13.7 2022    HCT 38.5 10/29/2022    HCT 41.3 2022     10/29/2022     2022     BMP:   Lab Results   Component Value Date     10/29/2022     2022    POTASSIUM 4.3 10/29/2022    POTASSIUM 4.3 2022    CHLORIDE 108 10/29/2022    CHLORIDE 103 2022    CO2 26 10/29/2022    CO2 22 2022    BUN 10 10/29/2022    BUN 9.0 2022    CR 0.70 10/29/2022    CR 0.77 2022    GLC 92 10/29/2022    GLC 81 2022     COAGS: No results found for: PTT, INR, FIBR  POC:   Lab Results   Component Value Date    HCG Negative 2023     HEPATIC:   Lab Results   Component Value Date    ALBUMIN 4.1 10/29/2022    PROTTOTAL 7.4 10/29/2022    ALT 25 10/29/2022    AST 19 10/29/2022    ALKPHOS 94  10/29/2022    BILITOTAL 0.6 10/29/2022     OTHER:   Lab Results   Component Value Date    VERITO 9.2 10/29/2022    TSH 1.38 02/04/2022    CRP 0.3 02/04/2022    SED 8 10/29/2022       Anesthesia Plan    ASA Status:  2      Anesthesia Type: MAC.     - Reason for MAC: straight local not clinically adequate   Induction: Intravenous, Propofol.   Maintenance: TIVA.        Consents         - Extended Intubation/Ventilatory Support Discussed: No.      - Patient is DNR/DNI Status: No    Use of blood products discussed: No .     Postoperative Care       PONV prophylaxis: Ondansetron (or other 5HT-3), Background Propofol Infusion     Comments:                Thien Elaine MD

## 2023-04-03 ENCOUNTER — HOSPITAL ENCOUNTER (OUTPATIENT)
Facility: AMBULATORY SURGERY CENTER | Age: 28
Discharge: HOME | End: 2023-04-03
Attending: INTERNAL MEDICINE | Admitting: INTERNAL MEDICINE
Payer: COMMERCIAL

## 2023-04-03 ENCOUNTER — ANESTHESIA (OUTPATIENT)
Dept: SURGERY | Facility: AMBULATORY SURGERY CENTER | Age: 28
End: 2023-04-03
Payer: COMMERCIAL

## 2023-04-03 VITALS
DIASTOLIC BLOOD PRESSURE: 60 MMHG | WEIGHT: 150 LBS | TEMPERATURE: 97 F | BODY MASS INDEX: 22.73 KG/M2 | RESPIRATION RATE: 16 BRPM | HEIGHT: 68 IN | HEART RATE: 68 BPM | OXYGEN SATURATION: 98 % | SYSTOLIC BLOOD PRESSURE: 98 MMHG

## 2023-04-03 VITALS — HEART RATE: 81 BPM

## 2023-04-03 LAB
COLONOSCOPY: NORMAL
HCG UR QL: NEGATIVE
INTERNAL QC OK POCT: NORMAL
POCT KIT EXPIRATION DATE: NORMAL
POCT KIT LOT NUMBER: NORMAL

## 2023-04-03 PROCEDURE — 81025 URINE PREGNANCY TEST: CPT | Performed by: PATHOLOGY

## 2023-04-03 PROCEDURE — 88305 TISSUE EXAM BY PATHOLOGIST: CPT | Mod: TC | Performed by: INTERNAL MEDICINE

## 2023-04-03 PROCEDURE — 88305 TISSUE EXAM BY PATHOLOGIST: CPT | Mod: 26 | Performed by: PATHOLOGY

## 2023-04-03 PROCEDURE — 45380 COLONOSCOPY AND BIOPSY: CPT

## 2023-04-03 RX ORDER — PROPOFOL 10 MG/ML
INJECTION, EMULSION INTRAVENOUS PRN
Status: DISCONTINUED | OUTPATIENT
Start: 2023-04-03 | End: 2023-04-03

## 2023-04-03 RX ORDER — NALOXONE HYDROCHLORIDE 0.4 MG/ML
0.4 INJECTION, SOLUTION INTRAMUSCULAR; INTRAVENOUS; SUBCUTANEOUS
Status: DISCONTINUED | OUTPATIENT
Start: 2023-04-03 | End: 2023-04-04 | Stop reason: HOSPADM

## 2023-04-03 RX ORDER — NALOXONE HYDROCHLORIDE 0.4 MG/ML
0.2 INJECTION, SOLUTION INTRAMUSCULAR; INTRAVENOUS; SUBCUTANEOUS
Status: DISCONTINUED | OUTPATIENT
Start: 2023-04-03 | End: 2023-04-04 | Stop reason: HOSPADM

## 2023-04-03 RX ORDER — FLUMAZENIL 0.1 MG/ML
0.2 INJECTION, SOLUTION INTRAVENOUS
Status: DISCONTINUED | OUTPATIENT
Start: 2023-04-03 | End: 2023-04-04 | Stop reason: HOSPADM

## 2023-04-03 RX ORDER — ONDANSETRON 4 MG/1
4 TABLET, ORALLY DISINTEGRATING ORAL EVERY 6 HOURS PRN
Status: DISCONTINUED | OUTPATIENT
Start: 2023-04-03 | End: 2023-04-04 | Stop reason: HOSPADM

## 2023-04-03 RX ORDER — LIDOCAINE 40 MG/G
CREAM TOPICAL
Status: DISCONTINUED | OUTPATIENT
Start: 2023-04-03 | End: 2023-04-03 | Stop reason: HOSPADM

## 2023-04-03 RX ORDER — ONDANSETRON 2 MG/ML
4 INJECTION INTRAMUSCULAR; INTRAVENOUS
Status: DISCONTINUED | OUTPATIENT
Start: 2023-04-03 | End: 2023-04-03 | Stop reason: HOSPADM

## 2023-04-03 RX ORDER — ONDANSETRON 2 MG/ML
4 INJECTION INTRAMUSCULAR; INTRAVENOUS EVERY 6 HOURS PRN
Status: DISCONTINUED | OUTPATIENT
Start: 2023-04-03 | End: 2023-04-04 | Stop reason: HOSPADM

## 2023-04-03 RX ORDER — PROPOFOL 10 MG/ML
INJECTION, EMULSION INTRAVENOUS CONTINUOUS PRN
Status: DISCONTINUED | OUTPATIENT
Start: 2023-04-03 | End: 2023-04-03

## 2023-04-03 RX ADMIN — PROPOFOL 150 MCG/KG/MIN: 10 INJECTION, EMULSION INTRAVENOUS at 14:35

## 2023-04-03 RX ADMIN — PROPOFOL 50 MG: 10 INJECTION, EMULSION INTRAVENOUS at 14:35

## 2023-04-03 RX ADMIN — PROPOFOL 50 MG: 10 INJECTION, EMULSION INTRAVENOUS at 14:38

## 2023-04-03 NOTE — H&P
Arleen Owusu  [id number]  female  27 year old      Reason for procedure/surgery: Crohn's Disease    Patient Active Problem List   Diagnosis     Crohn's disease of colon (H)     History of ulcerative colitis     Slava'jannet beckham       Past Surgical History:    Past Surgical History:   Procedure Laterality Date     COLONOSCOPY N/A 2/14/2022    Procedure: COLONOSCOPY, WITH  BIOPSY;  Surgeon: Luis Alberto Jacobs MD;  Location: [facility] OR       Past Medical History: No past medical history on file.    Social History:   Social History     Tobacco Use     Smoking status: Never     Smokeless tobacco: Never   Vaping Use     Vaping status: Not on file   Substance Use Topics     Alcohol use: Not on file       Family History: No family history on file.    Allergies:   Allergies   Allergen Reactions     Ibuprofen      Pt has an autoimmune disease it causes a negative colon reaction , can tolerate tylenol     Corn Oil Unknown     Seasonale Headache, Hives and Itching     Tri-Levlen, Headache, Hives and Itching     Latex Rash       Active Medications:   Current Outpatient Medications   Medication Sig Dispense Refill     buPROPion (WELLBUTRIN) 100 MG tablet Take 1 tablet (100 mg) by mouth daily 90 tablet 3     mesalamine (APRISO ER) 0.375 g 24 hr capsule Take 4 capsules (1.5 g) by mouth daily 120 capsule 2     sertraline (ZOLOFT) 100 MG tablet Take 1 tablet (100 mg) by mouth 2 times daily 180 tablet 3     bisacodyl (DULCOLAX) 5 MG EC tablet Take 2 tablets at 3 pm the day before your procedure. If your procedure is before 11 am, take 2 additional tablets at 11 pm. If your procedure is after 11 am, take 2 additional tablets at 6 am. For additional instructions refer to your colonoscopy prep instructions. 4 tablet 0     fluticasone (FLONASE) 50 MCG/ACT nasal spray        levonorgestrel (KYLEENA) 19.5 MG IUD 1 each (19.5 mg) by Intrauterine route once       polyethylene glycol (GOLYTELY) 236 g suspension The night before the exam at  "6 pm drink an 8-ounce glass every 15 minutes until the jug is half empty. If you arrive before 11 AM: Drink the other half of the Golytely jug at 11 PM night before procedure. If you arrive after 11 AM: Drink the other half of the Golytely jug at 6 AM day of procedure. For additional instructions refer to your colonoscopy prep instructions. 4000 mL 0       Systemic Review:   CONSTITUTIONAL: NEGATIVE for fever, chills, change in weight  ENT/MOUTH: NEGATIVE for ear, mouth and throat problems  RESP: NEGATIVE for significant cough or SOB  CV: NEGATIVE for chest pain, palpitations or peripheral edema    Physical Examination:   Vital Signs: /65 (BP Location: Right arm)   Pulse 75   Temp 98.2  F (36.8  C) (Temporal)   Resp 18   Ht 1.727 m (5' 8\")   Wt 68 kg (150 lb)   SpO2 96%   BMI 22.81 kg/m    GENERAL: healthy, alert and no distress  NECK: no adenopathy, no asymmetry, masses, or scars  RESP: lungs clear to auscultation - no rales, rhonchi or wheezes  CV: regular rate and rhythm, normal S1 S2, no S3 or S4, no murmur, click or rub, no peripheral edema and peripheral pulses strong  ABDOMEN: soft, nontender, no hepatosplenomegaly, no masses and bowel sounds normal  MS: no gross musculoskeletal defects noted, no edema    Plan: Appropriate to proceed as scheduled.      Luis Alberto Jacobs MD  4/3/2023    PCP:  Dilan Dan    "

## 2023-04-03 NOTE — ANESTHESIA CARE TRANSFER NOTE
Patient: Arleen Owusu    Procedure: Procedure(s):  COLONOSCOPY, WITH BIOPSY       Diagnosis: Crohn's disease of colon without complication (H) [K50.10]  Diagnosis Additional Information: No value filed.    Anesthesia Type:   MAC     Note:  Anesthesia Care Transfer Notewriter  Vitals:  Vitals Value Taken Time   /55 04/03/23 1458   Temp 36.1  C (97  F) 04/03/23 1458   Pulse 65 04/03/23 1458   Resp 16 04/03/23 1458   SpO2 98 % 04/03/23 1458       Electronically Signed By: Silvino Arciniega MD, MD  April 3, 2023  3:01 PM

## 2023-04-03 NOTE — ANESTHESIA CARE TRANSFER NOTE
Patient: Arleen Owusu    Procedure: Procedure(s):  COLONOSCOPY, WITH BIOPSY       Diagnosis: Crohn's disease of colon without complication (H) [K50.10]  Diagnosis Additional Information: No value filed.    Anesthesia Type:   MAC     Note:    Oropharynx: oropharynx clear of all foreign objects and spontaneously breathing  Level of Consciousness: awake  Oxygen Supplementation: room air    Independent Airway: airway patency satisfactory and stable  Dentition: dentition unchanged  Vital Signs Stable: post-procedure vital signs reviewed and stable  Report to RN Given: handoff report given  Patient transferred to: Phase II  Comments: Report to Phase II RN. Resps easy and regular.   Handoff Report: Identifed the Patient, Identified the Reponsible Provider, Reviewed the pertinent medical history, Discussed the surgical course, Reviewed Intra-OP anesthesia mangement and issues during anesthesia, Set expectations for post-procedure period and Allowed opportunity for questions and acknowledgement of understanding      Vitals:  Vitals Value Taken Time   /55 04/03/23 1458   Temp 36.1  C (97  F) 04/03/23 1458   Pulse 65 04/03/23 1458   Resp 16 04/03/23 1458   SpO2 98 % 04/03/23 1458       Electronically Signed By: GLORIA LAM CRNA  April 3, 2023  3:20 PM

## 2023-04-03 NOTE — ANESTHESIA POSTPROCEDURE EVALUATION
Patient: Arleen Owusu    Procedure: Procedure(s):  COLONOSCOPY, WITH BIOPSY       Anesthesia Type:  MAC    Note:  Disposition: Outpatient   Postop Pain Control: Uneventful            Sign Out: Well controlled pain   PONV: No   Neuro/Psych: Uneventful            Sign Out: Acceptable/Baseline neuro status   Airway/Respiratory: Uneventful            Sign Out: Acceptable/Baseline resp. status   CV/Hemodynamics: Uneventful            Sign Out: Acceptable CV status; No obvious hypovolemia; No obvious fluid overload   Other NRE: NONE   DID A NON-ROUTINE EVENT OCCUR? No           Last vitals:  Vitals Value Taken Time   /55 04/03/23 1458   Temp 36.1  C (97  F) 04/03/23 1458   Pulse 65 04/03/23 1458   Resp 16 04/03/23 1458   SpO2 98 % 04/03/23 1458       Electronically Signed By: Silvino Arciniega MD, MD  April 3, 2023  3:00 PM

## 2023-04-05 ENCOUNTER — TELEPHONE (OUTPATIENT)
Dept: GASTROENTEROLOGY | Facility: CLINIC | Age: 28
End: 2023-04-05
Payer: COMMERCIAL

## 2023-04-05 NOTE — TELEPHONE ENCOUNTER
Called and left message for Pt. Called Pt to offer an appointment with Shanae Swanson on 4/20/2023 at 8am. Writer left call back number.

## 2023-04-07 NOTE — TELEPHONE ENCOUNTER
Pt called back. Pt is now scheduled for a follow up with Shanae Swanson on 4/20/2023 at 8am for an in-person clinic visit.

## 2023-04-11 ENCOUNTER — MYC MEDICAL ADVICE (OUTPATIENT)
Dept: GASTROENTEROLOGY | Facility: CLINIC | Age: 28
End: 2023-04-11
Payer: COMMERCIAL

## 2023-04-14 NOTE — TELEPHONE ENCOUNTER
Called to remind patient of their upcoming appointment with our GI clinic, on Thursday 4/20/2023 at 7:45AM with Shanae Swanson. This appointment is scheduled as an in-person appt. Please arrive 15 minutes early to check in for your appointment. , if your appointment is virtual (video or telephone) you need to be in [place] for the visit. To reschedule or cancel patient to call [phone].    Earline Mota MA

## 2023-04-21 ENCOUNTER — MYC MEDICAL ADVICE (OUTPATIENT)
Dept: GASTROENTEROLOGY | Facility: CLINIC | Age: 28
End: 2023-04-21
Payer: COMMERCIAL

## 2023-04-21 NOTE — TELEPHONE ENCOUNTER
Received response from MD to overbook patient 4/27 at 7:20am. Sent Air Button message to patient confirming appointment time will work

## 2023-04-26 ASSESSMENT — ENCOUNTER SYMPTOMS
VOMITING: 0
ABDOMINAL PAIN: 1
NERVOUS/ANXIOUS: 1
BLOATING: 1
PANIC: 0
DECREASED CONCENTRATION: 1
DEPRESSION: 1
BLOOD IN STOOL: 0
NAUSEA: 1
CONSTIPATION: 0
INSOMNIA: 1
DIARRHEA: 0
HEARTBURN: 0
BOWEL INCONTINENCE: 1
JAUNDICE: 0
RECTAL PAIN: 0

## 2023-04-27 ENCOUNTER — TELEPHONE (OUTPATIENT)
Dept: GASTROENTEROLOGY | Facility: CLINIC | Age: 28
End: 2023-04-27

## 2023-04-27 ENCOUNTER — VIRTUAL VISIT (OUTPATIENT)
Dept: GASTROENTEROLOGY | Facility: CLINIC | Age: 28
End: 2023-04-27
Payer: COMMERCIAL

## 2023-04-27 VITALS — WEIGHT: 150 LBS | BODY MASS INDEX: 22.81 KG/M2

## 2023-04-27 DIAGNOSIS — K50.10 CROHN'S DISEASE OF COLON WITHOUT COMPLICATION (H): Primary | ICD-10-CM

## 2023-04-27 PROCEDURE — 99215 OFFICE O/P EST HI 40 MIN: CPT | Mod: VID | Performed by: INTERNAL MEDICINE

## 2023-04-27 RX ORDER — EPINEPHRINE 1 MG/ML
0.3 INJECTION, SOLUTION, CONCENTRATE INTRAVENOUS EVERY 5 MIN PRN
Status: CANCELLED | OUTPATIENT
Start: 2023-04-27

## 2023-04-27 RX ORDER — ALBUTEROL SULFATE 0.83 MG/ML
2.5 SOLUTION RESPIRATORY (INHALATION)
Status: CANCELLED | OUTPATIENT
Start: 2023-04-27

## 2023-04-27 RX ORDER — BUDESONIDE 3 MG/1
9 CAPSULE, COATED PELLETS ORAL EVERY MORNING
Qty: 270 CAPSULE | Refills: 3 | Status: SHIPPED | OUTPATIENT
Start: 2023-04-27 | End: 2023-10-16

## 2023-04-27 RX ORDER — METHYLPREDNISOLONE SODIUM SUCCINATE 125 MG/2ML
125 INJECTION, POWDER, LYOPHILIZED, FOR SOLUTION INTRAMUSCULAR; INTRAVENOUS
Status: CANCELLED
Start: 2023-04-27

## 2023-04-27 RX ORDER — ALBUTEROL SULFATE 90 UG/1
1-2 AEROSOL, METERED RESPIRATORY (INHALATION)
Status: CANCELLED
Start: 2023-04-27

## 2023-04-27 RX ORDER — DIPHENHYDRAMINE HYDROCHLORIDE 50 MG/ML
50 INJECTION INTRAMUSCULAR; INTRAVENOUS
Status: CANCELLED
Start: 2023-04-27

## 2023-04-27 RX ORDER — MEPERIDINE HYDROCHLORIDE 25 MG/ML
25 INJECTION INTRAMUSCULAR; INTRAVENOUS; SUBCUTANEOUS EVERY 30 MIN PRN
Status: CANCELLED | OUTPATIENT
Start: 2023-04-27

## 2023-04-27 ASSESSMENT — PAIN SCALES - GENERAL: PAINLEVEL: NO PAIN (0)

## 2023-04-27 NOTE — TELEPHONE ENCOUNTER
MTM referral placed. Patient noted to have lab appointment tomorrow evening to complete TB. Therapy plan entered. Reminder set to order standing labs once patient completes current labs. Left voicemail and sent [employer] message inquiring if the [facility] would be a convenient location for the infusion. Awaiting response/callback.

## 2023-04-27 NOTE — LETTER
4/27/2023         RE: Arleen Owusu  [address]  [place] [address]        Dear Colleague,    Thank you for referring your patient, Arleen Owusu, to the [facility] [place]. Please see a copy of my visit note below.    Arleen Owusu is a 27 year old female who is being evaluated via a billable video visit.        IBD CLINIC VISIT    CC/REFERRING MD:  Luis Alberto Jacobs    REASON FOR CONSULTATION: follow up Crohn's Colitis    ASSESSMENT/PLAN:    1. Crohn's colitis - moderate. Has had recurrent symptoms of flare that have been confirmed with elevated fecal calpro (> 120) and moderate cecal/ascending inflammation on colonoscopy. Responded to course of entocort but recurred with tapering. Mesalamine is not enough to control her disease. She requires dose escalation. Discussed anti-TNF, anti-integrin, and anti-interleukin. Given moderate disease we would like to trial ustekinumab. Discussed sometimes insurance companies force us to use alternative (such as anti-TNF). She understands. Will seek PA for ustekinumab    -start PA for ustekinumab (if denied will likely need anti-TNF)  -start Entocort 9 mg x 4 weeks, then 6 mg x 2 weeks then 3 mg x 2 weeks then stop  -continue mesalamine for now  -she will get blood work (including TB quant) - already has hep B serologies    2. Constipation - PRN miralax    3. Prior history of recurrent c diff - negative 10/2022    IBD HISTORY  Age at diagnosis: 22/23 (2019)  Extent of disease: right sided colitis  Disease phenotype: inflammatory  Melly-anal disease: none  Current CD medications:  -Apriso 1500mg daily  Prior IBD surgeries: none  Prior IBD Medications:  -prednisone taper 2/2020    DRUG MONITORING  TPMT enzyme activity: 24 (WNL)    6-TGN/6-MMPN levels: NA    Biologic concentration: NA    DISEASE ASSESSMENT  Labs  Recent Labs   Lab Test 10/29/22  1215 08/26/22  1318 02/04/22  1149   CRP  --   --  0.3   SED 8 8 7     Fecal  calprotectin: 170 (10/30/22); 74 (2/2022)  Endoscopy:   -Colonoscopy 2/14/22 - [facility] - 1. Mild erythema at appendiceal orifice. Otherwise totally normal colon  -Colonoscopy 4/2023 - [facility] - 5. Alphous ulcers, erythema and loss of vasculature cecum and proximal ascending  Enterography: MRE normal 3/322  C diff: negative 10/30/22  -July 2020, Oct 2020, Dec 2020 - then did 6 weeks vanco and none since    sIBDQ:   IBDQ Score Date IBDQ - Total Score  (Higher score better)   11/21/2022  10:29 PM 56   5/19/2022   1:57 PM 61   1/25/2022  12:44 PM 61       IBD Health Care Maintenance:    Vaccinations:  All patients on biologics should avoid live vaccines.    -- Influenza (every year)  -- TdaP (every 10 years)  -- Pneumococcal Pneumonia (once plus booster at 5 years)  -- Yearly assessment for latent Tb (verbal screening and exam, PPD or QuantiFERON-Tb testing)    One time confirmation of immunity or serologies:  -- Hepatitis A (serologies or immunizations)  -- Hepatitis B (serologies or immunizations)  -- Varicella  -- MMR  -- HPV (all aged 18-26)  -- Meningococcal meningitis (all patients at risk for meningitis)  -- Due to the immunosuppression in this patient, I would not advise administration of live vaccines such as varicella/VZV, intranasal influenza, MMR, or yellow fever vaccine (if travelling).      Bone mineral density screening   -- Recommend all patients supplement with calcium and vitamin D  -- Given prior steroid use recommend DEXA if not already done - will discuss next visit (Entocort)    Cancer Screening:  Colon cancer screening:  Given right sided colits, recommend patient undergo regular dysplasia surveillance   Next dysplasia screening is recommended 2027.    Cervical cancer screening: Annual due to immunosupression    Skin cancer screening: Annual visual exam of skin by dermatologist since patient is immunocompromised    Depression Screening:  -- Over the last month, have you felt down, depressed, or  hopeless? no  -- Over the last month, have you felt little interest or pleasure doing things? no    Misc:  -- Avoid tobacco use  -- Avoid NSAIDs as there is potentially a 25% chance of causing an IBD flare    Return to clinic in 3 months with Lan Trent and 6 months Dr. Jaocbs     Thank you for this consultation.  It was a pleasure to participate in the care of this patient; please contact us with any further questions.  I spent a total of 44 minutes during the day of encounter performed chart review, meeting with patient, patient counseling, care coordination, and documentation.    This note was created with voice recognition software, and while reviewed for accuracy, typos may remain.     Luis Alberto Jacobs MD  Division of Gastroenterology, Hepatology and Nutrition  [facility]  Pager: [phone]      HPI:   Currently, here for follow up after colonoscopy - showed worsening inflammation cecum/ascending - moderate.    Has had symptoms of flare -started last fall. Abdominal pain and looser stools. Responded to entocort but then returned after taper.    No fevers/chills/sweats. No mouth sores, eye pain, redness, vision changes, joint pains    ROS:    No fevers or chills  No weight loss  No blurry vision, double vision or change in vision  No sore throat  No lymphadenopathy  No headache, paraesthesias, or weakness in a limb  No shortness of breath or wheezing  No chest pain or pressure  No arthralgias or myalgias  No rashes or skin changes  No odynophagia or dysphagia  No BRBPR, hematochezia, melena  No dysuria, frequency or urgency  No hot/cold intolerance or polyria  No anxiety or depression    Extra intestinal manifestations of IBD:  No uveitis/episcleritis  No aphthous ulcers   No arthritis   No erythema nodosum/pyoderma gangrenosum.     PERTINENT PAST MEDICAL HISTORY:  No past medical history on file.    PREVIOUS SURGERIES:  Past Surgical History:   Procedure Laterality Date    COLONOSCOPY N/A 2/14/2022     Procedure: COLONOSCOPY, WITH  BIOPSY;  Surgeon: Yancy Jacobs MD;  Location: [facility] OR    COLONOSCOPY N/A 4/3/2023    Procedure: COLONOSCOPY, WITH BIOPSY;  Surgeon: Yancy Jacobs MD;  Location: [facility] OR       PREVIOUS ENDOSCOPY:  Results for orders placed or performed during the hospital encounter of 04/03/23   COLONOSCOPY   Result Value Ref Range    COLONOSCOPY       [facility]  [address] [place], [place] [address]., [place] [address] [phone]     Endoscopy Department  _______________________________________________________________________________  Patient Name: Arleen Owusu              Procedure Date: 4/3/2023 11:15 AM  MRN: [id number]                       Account Number: [id number]  YOB: 1995             Admit Type: Outpatient  Age: 27                               Room: [facility] PROCEDURE ROOM 03  Gender: Female                        Note Status: Finalized  Attending MD: YANCY JACOBS MD  Total Sedation Time:   _______________________________________________________________________________     Procedure:             Colonoscopy  Indications:           Disease activity assessment of Crohn's disease of the                          colon. On Apriso 1.5 grams daily with Entocort taper                          in 11-12/2022  Providers:             YANCY JACOBS MD, Roselia Laureano, RN, Kelsi Ramires  Referring MD:          IRVING PEREZ  Requesting Provider:   Shanae Perez  Medicines:             Monitored Anesthesia Care  Complications:         No immediate complications.  _______________________________________________________________________________  Procedure:             Pre-Anesthesia Assessment:                         - See EPIC H and P note                         After obtaining informed consent, the colonoscope was                          passed under direct vision. Throughout the procedure,                          the patient's blood pressure, pulse, and  oxygen                          saturations were monitored continuously. The                          Colonoscope was introduced through the anus and                          advanced to the terminal ileum, with identification of                          the appendiceal orifice and IC valve. The colonoscopy                          was performed without difficulty. The patient                          tolerated the procedure well. The quality o f the bowel                          preparation was evaluated using the BBPS ([place] Bowel                          Preparation Scale) with scores of: Right Colon = 2                          (minor amount of residual staining, small fragments of                          stool and/or opaque liquid, but mucosa seen well),                          Transverse Colon = 3 (entire mucosa seen well with no                          residual staining, small fragments of stool or opaque                          liquid) and Left Colon = 3 (entire mucosa seen well                          with no residual staining, small fragments of stool or                          opaque liquid). The total BBPS score equals 8. The                          quality of the bowel preparation was good.                                                                                   Findings:       Skin tags were found on perianal exam.       The Simple Endoscopic Score for Crohn's Disease was determined based on         the endoscopic appearance of the mucosa in the following segments:       - Ileum: Findings include no ulcers present, no ulcerated surfaces, no        affected surfaces and no narrowings. Segment score: 0.       - Right Colon: Findings include aphthous ulcers less than 0.5 cm in        size, 10-30% ulcerated surfaces, 50-75% of surfaces affected and no        narrowings. Segment score: 5.       - Transverse Colon: Findings include no ulcers present, no ulcerated        surfaces, no  affected surfaces and no narrowings. Segment score: 0.       - Left Colon: Findings include no ulcers present, no ulcerated surfaces,        no affected surfaces and no narrowings. Segment score: 0.       - Rectum: Findings include no ulcers present, no ulcerated surfaces, no        affected surfaces and no narrowings. Segment score: 0.       - Total SES-CD aggregate score: 5. Biopsies were taken with a cold        forceps for histology (cecum/ascending, transverse, descending/sigmoid,        rectum in  separate jars).       The terminal ileum appeared normal.                                                                                   Impression:            - Perianal skin tags found on perianal exam.                         - Simple Endoscopic Score for Crohn's Disease: 5,                          mucosal inflammatory changes secondary to Crohn's                          disease with colonic involvement. Biopsied.                         - The examined portion of the ileum was normal.                         Moderate inflammation in cecum, IC valve and proximal                          ascending with erythema, aphthous ulcers and loss of                          vasculature. Consistent with moderate Crohn's that is                          worse than 1 year ago. Mesalamine is not adequate.                          Will have her follow up in clinic to discuss                          escalation of therapy to biologics.  Recommendation:        - Discharge patient to home (with  lizette).                         - Resume previous diet.                         - Continue present medications.                         - Await pathology results.                         - Repeat colonoscopy in 4 years for surveillance.                         - Return to GI clinic - see above discussion                                                                                     Electronically Signed by: Dr. Luis Alberto WILSON  Reynaldo  ___________________________  YANCY SCHMITZ MD  4/3/2023 3:09:24 PM  I was physically present for the entire viewing portion of the exam.  __________________________  Signature of teaching physician  YANCY SCHMITZ MD  Number of Addenda: 0    Note Initiated On: 4/3/2023 11:15 AM  Scope In:  Scope Out:     ]    ALLERGIES:     Allergies   Allergen Reactions    Ibuprofen      Pt has an autoimmune disease it causes a negative colon reaction , can tolerate tylenol    Corn Oil Unknown    Levonorgestrel-Ethinyl Estrad Headache, Hives and Itching    Seasonale Headache, Hives and Itching    Latex Rash       PERTINENT MEDICATIONS:    Current Outpatient Medications:     bisacodyl (DULCOLAX) 5 MG EC tablet, Take 2 tablets at 3 pm the day before your procedure. If your procedure is before 11 am, take 2 additional tablets at 11 pm. If your procedure is after 11 am, take 2 additional tablets at 6 am. For additional instructions refer to your colonoscopy prep instructions., Disp: 4 tablet, Rfl: 0    buPROPion (WELLBUTRIN) 100 MG tablet, Take 1 tablet (100 mg) by mouth daily, Disp: 90 tablet, Rfl: 3    fluticasone (FLONASE) 50 MCG/ACT nasal spray, , Disp: , Rfl:     levonorgestrel (KYLEENA) 19.5 MG IUD, 1 each (19.5 mg) by Intrauterine route once, Disp: , Rfl:     mesalamine (APRISO ER) 0.375 g 24 hr capsule, Take 4 capsules (1.5 g) by mouth daily, Disp: 120 capsule, Rfl: 2    polyethylene glycol (GOLYTELY) 236 g suspension, The night before the exam at 6 pm drink an 8-ounce glass every 15 minutes until the jug is half empty. If you arrive before 11 AM: Drink the other half of the Golytely jug at 11 PM night before procedure. If you arrive after 11 AM: Drink the other half of the Golytely jug at 6 AM day of procedure. For additional instructions refer to your colonoscopy prep instructions., Disp: 4000 mL, Rfl: 0    sertraline (ZOLOFT) 100 MG tablet, Take 1 tablet (100 mg) by mouth 2 times daily, Disp: 180 tablet,  Rfl: 3    Current Facility-Administered Medications:     medroxyPROGESTERone (DEPO-PROVERA) injection 150 mg, 150 mg, Intramuscular, Q90 Days, Dilan Dan APRN CNP, 150 mg at 06/20/22 0906    SOCIAL HISTORY:  Social History     Socioeconomic History    Marital status: Single     Spouse name: Not on file    Number of children: Not on file    Years of education: Not on file    Highest education level: Not on file   Occupational History    Not on file   Tobacco Use    Smoking status: Never    Smokeless tobacco: Never   Vaping Use    Vaping status: Not on file   Substance and Sexual Activity    Alcohol use: Not on file    Drug use: Not on file    Sexual activity: Not on file   Other Topics Concern    Not on file   Social History Narrative    Not on file     Social Determinants of Health     Financial Resource Strain: Not on file   Food Insecurity: Not on file   Transportation Needs: Not on file   Physical Activity: Not on file   Stress: Not on file   Social Connections: Not on file   Intimate Partner Violence: Not on file   Housing Stability: Not on file       FAMILY HISTORY:  No family history on file.    Past/family/social history reviewed and no changes    PHYSICAL EXAMINATION:  Constitutional: aaox3, cooperative, pleasant, not dyspneic/diaphoretic, no acute distress  Vitals reviewed: Wt 68 kg (150 lb)   BMI 22.81 kg/m    Wt:   Wt Readings from Last 2 Encounters:   04/27/23 68 kg (150 lb)   04/03/23 68 kg (150 lb)      Eyes: Sclera anicteric/injected  Respiratory: Unlabored breathing  Skin: no jaundice  Psych: Normal affect      PERTINENT STUDIES:  Most recent CBC:  Recent Labs   Lab Test 10/29/22  1214 08/26/22  1318   WBC 6.2 7.3   HGB 13.1 13.7   HCT 38.5 41.3    271     Most recent hepatic panel:  Recent Labs   Lab Test 10/29/22  1213 08/26/22  1318   ALT 25 16   AST 19 25     Most recent creatinine:  Recent Labs   Lab Test 10/29/22  1213 08/26/22  1318   CR 0.70 0.77           Again, thank you for  allowing me to participate in the care of your patient.      Sincerely,    Luis Alberto Jacobs MD

## 2023-04-27 NOTE — TELEPHONE ENCOUNTER
PA Initiation    Medication: Stelara initiated  Insurance Company: [employer]$ [employer] - Phone [phone] Fax [phone]  Pharmacy Filling the Rx:    Filling Pharmacy Phone:    Filling Pharmacy Fax:    Start Date: 4/27/2023    [id number]

## 2023-04-27 NOTE — PATIENT INSTRUCTIONS
It is good to see you this morning. I have outlined my plans below.    Please start the Entocort (budesonide) steroid pill to help start feeing better. Take 3 pills daily x 1 month, then 2 pills daily x 2 weeks, then 1 pill daily x 2 weeks and then stop    Please get your blood drawn as soon as you can. We will need your tuberculosis test to start the new medication    I will ask my team to get started on the Stelara approval from your insurance. If they do not approve this we may need to consider infliximab (Remicade) or adalimumab (Humira)    Follow up in 3 months with Lan Trent and 6 months with Dr. Reynaldo Kothari with questions

## 2023-04-27 NOTE — TELEPHONE ENCOUNTER
----- Message from Luis Alberto Jacobs MD sent at 4/27/2023  7:43 AM CDT -----  Regarding: Request PA for juan Reese    Please start working on PA for stelara.     Pt needs TB quant and she knows this. It is ordered    She also needs MTM referral to discuss new meds     Thanks!    J

## 2023-04-27 NOTE — PROGRESS NOTES
Arleen Owusu is a 27 year old female who is being evaluated via a billable video visit.      Virtual Visit Details    Type of service:  Video Visit     Originating Location (pt. Location): Home    Distant Location (provider location):  On-site  Platform used for Video Visit: Nessa       Video start: 7:29 AM  Video end: 7:39 AM      IBD CLINIC VISIT    CC/REFERRING MD:  Luis Alberto Jacobs    REASON FOR CONSULTATION: follow up Crohn's Colitis    ASSESSMENT/PLAN:    1. Crohn's colitis - moderate. Has had recurrent symptoms of flare that have been confirmed with elevated fecal calpro (> 120) and moderate cecal/ascending inflammation on colonoscopy. Responded to course of entocort but recurred with tapering. Mesalamine is not enough to control her disease. She requires dose escalation. Discussed anti-TNF, anti-integrin, and anti-interleukin. Given moderate disease we would like to trial ustekinumab. Discussed sometimes insurance companies force us to use alternative (such as anti-TNF). She understands. Will seek PA for ustekinumab    -start PA for ustekinumab (if denied will likely need anti-TNF)  -start Entocort 9 mg x 4 weeks, then 6 mg x 2 weeks then 3 mg x 2 weeks then stop  -continue mesalamine for now  -she will get blood work (including TB quant) - already has hep B serologies    2. Constipation - PRN miralax    3. Prior history of recurrent c diff - negative 10/2022    IBD HISTORY  Age at diagnosis: 22/23 (2019)  Extent of disease: right sided colitis  Disease phenotype: inflammatory  Melly-anal disease: none  Current CD medications:  -Apriso 1500mg daily  Prior IBD surgeries: none  Prior IBD Medications:  -prednisone taper 2/2020    DRUG MONITORING  TPMT enzyme activity: 24 (WNL)    6-TGN/6-MMPN levels: NA    Biologic concentration: NA    DISEASE ASSESSMENT  Labs  Recent Labs   Lab Test 10/29/22  1215 08/26/22  1318 02/04/22  1149   CRP  --   --  0.3   SED 8 8 7     Fecal calprotectin: 170 (10/30/22); 74  (2/2022)  Endoscopy:   -Colonoscopy 2/14/22 - [facility] - 1. Mild erythema at appendiceal orifice. Otherwise totally normal colon  -Colonoscopy 4/2023 - [facility] - 5. Alphous ulcers, erythema and loss of vasculature cecum and proximal ascending  Enterography: MRE normal 3/322  C diff: negative 10/30/22  -July 2020, Oct 2020, Dec 2020 - then did 6 weeks vanco and none since    sIBDQ:   IBDQ Score Date IBDQ - Total Score  (Higher score better)   11/21/2022  10:29 PM 56   5/19/2022   1:57 PM 61   1/25/2022  12:44 PM 61       IBD Health Care Maintenance:    Vaccinations:  All patients on biologics should avoid live vaccines.    -- Influenza (every year)  -- TdaP (every 10 years)  -- Pneumococcal Pneumonia (once plus booster at 5 years)  -- Yearly assessment for latent Tb (verbal screening and exam, PPD or QuantiFERON-Tb testing)    One time confirmation of immunity or serologies:  -- Hepatitis A (serologies or immunizations)  -- Hepatitis B (serologies or immunizations)  -- Varicella  -- MMR  -- HPV (all aged 18-26)  -- Meningococcal meningitis (all patients at risk for meningitis)  -- Due to the immunosuppression in this patient, I would not advise administration of live vaccines such as varicella/VZV, intranasal influenza, MMR, or yellow fever vaccine (if travelling).      Bone mineral density screening   -- Recommend all patients supplement with calcium and vitamin D  -- Given prior steroid use recommend DEXA if not already done - will discuss next visit (Entocort)    Cancer Screening:  Colon cancer screening:  Given right sided colits, recommend patient undergo regular dysplasia surveillance   Next dysplasia screening is recommended 2027.    Cervical cancer screening: Annual due to immunosupression    Skin cancer screening: Annual visual exam of skin by dermatologist since patient is immunocompromised    Depression Screening:  -- Over the last month, have you felt down, depressed, or hopeless? no  -- Over the last month,  have you felt little interest or pleasure doing things? no    Misc:  -- Avoid tobacco use  -- Avoid NSAIDs as there is potentially a 25% chance of causing an IBD flare    Return to clinic in 3 months with Lan Trent and 6 months Dr. Jacobs     Thank you for this consultation.  It was a pleasure to participate in the care of this patient; please contact us with any further questions.  I spent a total of 44 minutes during the day of encounter performed chart review, meeting with patient, patient counseling, care coordination, and documentation.    This note was created with voice recognition software, and while reviewed for accuracy, typos may remain.     Luis Alberto Jacobs MD  Division of Gastroenterology, Hepatology and Nutrition  [facility]  Pager: [phone]      HPI:   Currently, here for follow up after colonoscopy - showed worsening inflammation cecum/ascending - moderate.    Has had symptoms of flare -started last fall. Abdominal pain and looser stools. Responded to entocort but then returned after taper.    No fevers/chills/sweats. No mouth sores, eye pain, redness, vision changes, joint pains    ROS:    No fevers or chills  No weight loss  No blurry vision, double vision or change in vision  No sore throat  No lymphadenopathy  No headache, paraesthesias, or weakness in a limb  No shortness of breath or wheezing  No chest pain or pressure  No arthralgias or myalgias  No rashes or skin changes  No odynophagia or dysphagia  No BRBPR, hematochezia, melena  No dysuria, frequency or urgency  No hot/cold intolerance or polyria  No anxiety or depression    Extra intestinal manifestations of IBD:  No uveitis/episcleritis  No aphthous ulcers   No arthritis   No erythema nodosum/pyoderma gangrenosum.     PERTINENT PAST MEDICAL HISTORY:  No past medical history on file.    PREVIOUS SURGERIES:  Past Surgical History:   Procedure Laterality Date     COLONOSCOPY N/A 2/14/2022    Procedure: COLONOSCOPY, WITH  BIOPSY;   Surgeon: Yancy Jacobs MD;  Location: [facility] OR     COLONOSCOPY N/A 4/3/2023    Procedure: COLONOSCOPY, WITH BIOPSY;  Surgeon: Yancy Jacobs MD;  Location: [facility] OR       PREVIOUS ENDOSCOPY:  Results for orders placed or performed during the hospital encounter of 04/03/23   COLONOSCOPY   Result Value Ref Range    COLONOSCOPY       [facility]  [address] [place], [place] [address]., [place] [address] [phone]     Endoscopy Department  _______________________________________________________________________________  Patient Name: Arleen Owusu              Procedure Date: 4/3/2023 11:15 AM  MRN: [id number]                       Account Number: [id number]  YOB: 1995             Admit Type: Outpatient  Age: 27                               Room: [facility] PROCEDURE ROOM 03  Gender: Female                        Note Status: Finalized  Attending MD: YANCY JACOBS MD  Total Sedation Time:   _______________________________________________________________________________     Procedure:             Colonoscopy  Indications:           Disease activity assessment of Crohn's disease of the                          colon. On Apriso 1.5 grams daily with Entocort taper                          in 11-12/2022  Providers:             YANCY JACOBS MD, Roselia Laureano, RN, Kelsi Ramires  Referring MD:          IRVING PEREZ  Requesting Provider:   Shanae Perez  Medicines:             Monitored Anesthesia Care  Complications:         No immediate complications.  _______________________________________________________________________________  Procedure:             Pre-Anesthesia Assessment:                         - See EPIC H and P note                         After obtaining informed consent, the colonoscope was                          passed under direct vision. Throughout the procedure,                          the patient's blood pressure, pulse, and oxygen                           saturations were monitored continuously. The                          Colonoscope was introduced through the anus and                          advanced to the terminal ileum, with identification of                          the appendiceal orifice and IC valve. The colonoscopy                          was performed without difficulty. The patient                          tolerated the procedure well. The quality o f the bowel                          preparation was evaluated using the BBPS ([place] Bowel                          Preparation Scale) with scores of: Right Colon = 2                          (minor amount of residual staining, small fragments of                          stool and/or opaque liquid, but mucosa seen well),                          Transverse Colon = 3 (entire mucosa seen well with no                          residual staining, small fragments of stool or opaque                          liquid) and Left Colon = 3 (entire mucosa seen well                          with no residual staining, small fragments of stool or                          opaque liquid). The total BBPS score equals 8. The                          quality of the bowel preparation was good.                                                                                   Findings:       Skin tags were found on perianal exam.       The Simple Endoscopic Score for Crohn's Disease was determined based on         the endoscopic appearance of the mucosa in the following segments:       - Ileum: Findings include no ulcers present, no ulcerated surfaces, no        affected surfaces and no narrowings. Segment score: 0.       - Right Colon: Findings include aphthous ulcers less than 0.5 cm in        size, 10-30% ulcerated surfaces, 50-75% of surfaces affected and no        narrowings. Segment score: 5.       - Transverse Colon: Findings include no ulcers present, no ulcerated        surfaces, no affected surfaces and no narrowings.  Segment score: 0.       - Left Colon: Findings include no ulcers present, no ulcerated surfaces,        no affected surfaces and no narrowings. Segment score: 0.       - Rectum: Findings include no ulcers present, no ulcerated surfaces, no        affected surfaces and no narrowings. Segment score: 0.       - Total SES-CD aggregate score: 5. Biopsies were taken with a cold        forceps for histology (cecum/ascending, transverse, descending/sigmoid,        rectum in  separate jars).       The terminal ileum appeared normal.                                                                                   Impression:            - Perianal skin tags found on perianal exam.                         - Simple Endoscopic Score for Crohn's Disease: 5,                          mucosal inflammatory changes secondary to Crohn's                          disease with colonic involvement. Biopsied.                         - The examined portion of the ileum was normal.                         Moderate inflammation in cecum, IC valve and proximal                          ascending with erythema, aphthous ulcers and loss of                          vasculature. Consistent with moderate Crohn's that is                          worse than 1 year ago. Mesalamine is not adequate.                          Will have her follow up in clinic to discuss                          escalation of therapy to biologics.  Recommendation:        - Discharge patient to home (with es lizette).                         - Resume previous diet.                         - Continue present medications.                         - Await pathology results.                         - Repeat colonoscopy in 4 years for surveillance.                         - Return to GI clinic - see above discussion                                                                                     Electronically Signed by: Dr. Luis Alberto Jacobs  ___________________________  LUIS ALBERTO  CLIFFORD SCHMITZ MD  4/3/2023 3:09:24 PM  I was physically present for the entire viewing portion of the exam.  __________________________  Signature of teaching physician  YACNY SCHMITZ MD  Number of Addenda: 0    Note Initiated On: 4/3/2023 11:15 AM  Scope In:  Scope Out:     ]    ALLERGIES:     Allergies   Allergen Reactions     Ibuprofen      Pt has an autoimmune disease it causes a negative colon reaction , can tolerate tylenol     Corn Oil Unknown     Levonorgestrel-Ethinyl Estrad Headache, Hives and Itching     Seasonale Headache, Hives and Itching     Latex Rash       PERTINENT MEDICATIONS:    Current Outpatient Medications:      bisacodyl (DULCOLAX) 5 MG EC tablet, Take 2 tablets at 3 pm the day before your procedure. If your procedure is before 11 am, take 2 additional tablets at 11 pm. If your procedure is after 11 am, take 2 additional tablets at 6 am. For additional instructions refer to your colonoscopy prep instructions., Disp: 4 tablet, Rfl: 0     buPROPion (WELLBUTRIN) 100 MG tablet, Take 1 tablet (100 mg) by mouth daily, Disp: 90 tablet, Rfl: 3     fluticasone (FLONASE) 50 MCG/ACT nasal spray, , Disp: , Rfl:      levonorgestrel (KYLEENA) 19.5 MG IUD, 1 each (19.5 mg) by Intrauterine route once, Disp: , Rfl:      mesalamine (APRISO ER) 0.375 g 24 hr capsule, Take 4 capsules (1.5 g) by mouth daily, Disp: 120 capsule, Rfl: 2     polyethylene glycol (GOLYTELY) 236 g suspension, The night before the exam at 6 pm drink an 8-ounce glass every 15 minutes until the jug is half empty. If you arrive before 11 AM: Drink the other half of the Golytely jug at 11 PM night before procedure. If you arrive after 11 AM: Drink the other half of the Golytely jug at 6 AM day of procedure. For additional instructions refer to your colonoscopy prep instructions., Disp: 4000 mL, Rfl: 0     sertraline (ZOLOFT) 100 MG tablet, Take 1 tablet (100 mg) by mouth 2 times daily, Disp: 180 tablet, Rfl: 3    Current  Facility-Administered Medications:      medroxyPROGESTERone (DEPO-PROVERA) injection 150 mg, 150 mg, Intramuscular, Q90 Days, Dilan Dan APRN CNP, 150 mg at 06/20/22 0906    SOCIAL HISTORY:  Social History     Socioeconomic History     Marital status: Single     Spouse name: Not on file     Number of children: Not on file     Years of education: Not on file     Highest education level: Not on file   Occupational History     Not on file   Tobacco Use     Smoking status: Never     Smokeless tobacco: Never   Vaping Use     Vaping status: Not on file   Substance and Sexual Activity     Alcohol use: Not on file     Drug use: Not on file     Sexual activity: Not on file   Other Topics Concern     Not on file   Social History Narrative     Not on file     Social Determinants of Health     Financial Resource Strain: Not on file   Food Insecurity: Not on file   Transportation Needs: Not on file   Physical Activity: Not on file   Stress: Not on file   Social Connections: Not on file   Intimate Partner Violence: Not on file   Housing Stability: Not on file       FAMILY HISTORY:  No family history on file.    Past/family/social history reviewed and no changes    PHYSICAL EXAMINATION:  Constitutional: aaox3, cooperative, pleasant, not dyspneic/diaphoretic, no acute distress  Vitals reviewed: Wt 68 kg (150 lb)   BMI 22.81 kg/m    Wt:   Wt Readings from Last 2 Encounters:   04/27/23 68 kg (150 lb)   04/03/23 68 kg (150 lb)      Eyes: Sclera anicteric/injected  Respiratory: Unlabored breathing  Skin: no jaundice  Psych: Normal affect      PERTINENT STUDIES:  Most recent CBC:  Recent Labs   Lab Test 10/29/22  1214 08/26/22  1318   WBC 6.2 7.3   HGB 13.1 13.7   HCT 38.5 41.3    271     Most recent hepatic panel:  Recent Labs   Lab Test 10/29/22  1213 08/26/22  1318   ALT 25 16   AST 19 25     Most recent creatinine:  Recent Labs   Lab Test 10/29/22  1213 08/26/22  1318   CR 0.70 0.77

## 2023-04-27 NOTE — NURSING NOTE
Is the patient currently in the state of [place]? YES    Visit mode:VIDEO    If the visit is dropped, the patient can be reconnected by: VIDEO VISIT: Text to cell phone: [phone]    Will anyone else be joining the visit? NO      How would you like to obtain your AVS? MyChart    Are changes needed to the allergy or medication list? NO    Reason for visit: Video Visit (IBD)

## 2023-04-28 ENCOUNTER — LAB (OUTPATIENT)
Dept: LAB | Facility: CLINIC | Age: 28
End: 2023-04-28
Payer: COMMERCIAL

## 2023-04-28 DIAGNOSIS — K50.10 CROHN'S DISEASE OF COLON WITHOUT COMPLICATION (H): ICD-10-CM

## 2023-04-28 LAB
ALBUMIN SERPL BCG-MCNC: 4.6 G/DL (ref 3.5–5.2)
ALP SERPL-CCNC: 99 U/L (ref 35–104)
ALT SERPL W P-5'-P-CCNC: 15 U/L (ref 10–35)
ANION GAP SERPL CALCULATED.3IONS-SCNC: 10 MMOL/L (ref 7–15)
AST SERPL W P-5'-P-CCNC: 20 U/L (ref 10–35)
BASOPHILS # BLD AUTO: 0 10E3/UL (ref 0–0.2)
BASOPHILS NFR BLD AUTO: 0 %
BILIRUB DIRECT SERPL-MCNC: <0.2 MG/DL (ref 0–0.3)
BILIRUB SERPL-MCNC: 0.3 MG/DL
BUN SERPL-MCNC: 8.5 MG/DL (ref 6–20)
CALCIUM SERPL-MCNC: 9.9 MG/DL (ref 8.6–10)
CHLORIDE SERPL-SCNC: 102 MMOL/L (ref 98–107)
CREAT SERPL-MCNC: 0.8 MG/DL (ref 0.51–0.95)
CRP SERPL-MCNC: 3.22 MG/L
DEPRECATED HCO3 PLAS-SCNC: 25 MMOL/L (ref 22–29)
EOSINOPHIL # BLD AUTO: 0.1 10E3/UL (ref 0–0.7)
EOSINOPHIL NFR BLD AUTO: 1 %
ERYTHROCYTE [DISTWIDTH] IN BLOOD BY AUTOMATED COUNT: 13.3 % (ref 10–15)
ERYTHROCYTE [SEDIMENTATION RATE] IN BLOOD BY WESTERGREN METHOD: 7 MM/HR (ref 0–20)
FERRITIN SERPL-MCNC: 38 NG/ML (ref 6–175)
FOLATE SERPL-MCNC: 10.4 NG/ML (ref 4.6–34.8)
GFR SERPL CREATININE-BSD FRML MDRD: >90 ML/MIN/1.73M2
GLUCOSE SERPL-MCNC: 84 MG/DL (ref 70–99)
HCT VFR BLD AUTO: 40 % (ref 35–47)
HGB BLD-MCNC: 13.5 G/DL (ref 11.7–15.7)
IMM GRANULOCYTES # BLD: 0 10E3/UL
IMM GRANULOCYTES NFR BLD: 0 %
LYMPHOCYTES # BLD AUTO: 3.1 10E3/UL (ref 0.8–5.3)
LYMPHOCYTES NFR BLD AUTO: 36 %
MCH RBC QN AUTO: 28.4 PG (ref 26.5–33)
MCHC RBC AUTO-ENTMCNC: 33.8 G/DL (ref 31.5–36.5)
MCV RBC AUTO: 84 FL (ref 78–100)
MONOCYTES # BLD AUTO: 0.7 10E3/UL (ref 0–1.3)
MONOCYTES NFR BLD AUTO: 8 %
NEUTROPHILS # BLD AUTO: 4.7 10E3/UL (ref 1.6–8.3)
NEUTROPHILS NFR BLD AUTO: 55 %
NRBC # BLD AUTO: 0 10E3/UL
NRBC BLD AUTO-RTO: 0 /100
PLATELET # BLD AUTO: 277 10E3/UL (ref 150–450)
POTASSIUM SERPL-SCNC: 3.9 MMOL/L (ref 3.4–5.3)
PROT SERPL-MCNC: 7.4 G/DL (ref 6.4–8.3)
RBC # BLD AUTO: 4.75 10E6/UL (ref 3.8–5.2)
SODIUM SERPL-SCNC: 137 MMOL/L (ref 136–145)
VIT B12 SERPL-MCNC: 202 PG/ML (ref 232–1245)
WBC # BLD AUTO: 8.6 10E3/UL (ref 4–11)

## 2023-04-28 PROCEDURE — 82248 BILIRUBIN DIRECT: CPT | Performed by: PATHOLOGY

## 2023-04-28 PROCEDURE — 85025 COMPLETE CBC W/AUTO DIFF WBC: CPT | Performed by: PATHOLOGY

## 2023-04-28 PROCEDURE — 86481 TB AG RESPONSE T-CELL SUSP: CPT | Mod: 90 | Performed by: PATHOLOGY

## 2023-04-28 PROCEDURE — 82728 ASSAY OF FERRITIN: CPT | Performed by: PATHOLOGY

## 2023-04-28 PROCEDURE — 36415 COLL VENOUS BLD VENIPUNCTURE: CPT | Performed by: PATHOLOGY

## 2023-04-28 PROCEDURE — 85652 RBC SED RATE AUTOMATED: CPT | Performed by: PATHOLOGY

## 2023-04-28 PROCEDURE — 86140 C-REACTIVE PROTEIN: CPT | Performed by: PATHOLOGY

## 2023-04-28 PROCEDURE — 82746 ASSAY OF FOLIC ACID SERUM: CPT | Mod: 90 | Performed by: PATHOLOGY

## 2023-04-28 PROCEDURE — 82607 VITAMIN B-12: CPT | Mod: 90 | Performed by: PATHOLOGY

## 2023-04-28 PROCEDURE — 80053 COMPREHEN METABOLIC PANEL: CPT | Performed by: PATHOLOGY

## 2023-04-28 PROCEDURE — 99000 SPECIMEN HANDLING OFFICE-LAB: CPT | Performed by: PATHOLOGY

## 2023-04-28 NOTE — TELEPHONE ENCOUNTER
Prior Authorization Approval    Authorization Effective Date: 7/28/2023  Authorization Expiration Date: 4/28/2024  Medication: Stelara approved  Approved Dose/Quantity: 1/56  Reference #: [id number]   Insurance Company: [employer] [phone] Fax [phone]  Expected CoPay:       CoPay Card Available:      Foundation Assistance Needed:    Which Pharmacy is filling the prescription (Not needed for infusion/clinic administered):    Pharmacy Notified:    Patient Notified:

## 2023-04-30 LAB
QUANTIFERON MITOGEN: 10 IU/ML
QUANTIFERON NIL TUBE: 0 IU/ML
QUANTIFERON TB1 TUBE: 0.01 IU/ML
QUANTIFERON TB2 TUBE: 0

## 2023-05-01 ENCOUNTER — TELEPHONE (OUTPATIENT)
Dept: GASTROENTEROLOGY | Facility: CLINIC | Age: 28
End: 2023-05-01
Payer: COMMERCIAL

## 2023-05-01 DIAGNOSIS — E53.8 VITAMIN B12 DEFICIENCY (NON ANEMIC): Primary | ICD-10-CM

## 2023-05-01 LAB
GAMMA INTERFERON BACKGROUND BLD IA-ACNC: 0 IU/ML
M TB IFN-G BLD-IMP: NEGATIVE
M TB IFN-G CD4+ BCKGRND COR BLD-ACNC: 10 IU/ML
MITOGEN IGNF BCKGRD COR BLD-ACNC: 0 IU/ML
MITOGEN IGNF BCKGRD COR BLD-ACNC: 0.01 IU/ML

## 2023-05-01 RX ORDER — CYANOCOBALAMIN 1000 UG/ML
1 INJECTION, SOLUTION INTRAMUSCULAR; SUBCUTANEOUS
Qty: 1 ML | Refills: 11 | Status: SHIPPED | OUTPATIENT
Start: 2023-05-01

## 2023-05-01 NOTE — TELEPHONE ENCOUNTER
Appian message sent to infusion finance to initiate prior authorization for Stelara infusion at the [facility]. Patient attended lab appointment on Friday, TB result back and is negative. Standing labs ordered.

## 2023-05-01 NOTE — TELEPHONE ENCOUNTER
----- Message from Luis Alberto Jacobs MD sent at 5/1/2023  2:09 PM CDT -----  Regarding: Vit B12 shots  Arleen Reese's vit B12 is low. Can you contact her to start vitamin B12 shots? If she is really opposed to shots we can try oral. But shots are likely more effective. Thanks!    Luis Alberto

## 2023-05-01 NOTE — TELEPHONE ENCOUNTER
Attempted to contact the patient. No answer, left detailed message requesting callback. [employer] message sent.

## 2023-05-01 NOTE — TELEPHONE ENCOUNTER
MTM referral from: [facility] visit (referral by provider)    MTM referral outreach attempt #2 on May 1, 2023 at 2:57 PM      Outcome: Patient not reachable after several attempts, will route to [place] Pharmacist/Provider as an FYI.  [facility] scheduling number is [phone].  Thank you for the referral.    Asim Kennedy [facility]

## 2023-05-02 ENCOUNTER — PATIENT OUTREACH (OUTPATIENT)
Dept: GASTROENTEROLOGY | Facility: CLINIC | Age: 28
End: 2023-05-02
Payer: COMMERCIAL

## 2023-05-02 NOTE — PROGRESS NOTES
Medication Therapy Management (MTM) Encounter    ASSESSMENT:                            Crohn's colitis: Some improvement since starting budesonide.  I recommended that she continue in addition to mesalamine.  She did receive baseline labs, TB, and hep B serology, but I do not see that hep C was done therefore I will add this to her labs from last week.  We discussed Stelara in detail today.  We discussed mechanism of action, immunosuppression, induction/maintenance dose.  We are still waiting to hear from her insurance regarding the infusion.  We will later set up an appointment for her subcutaneous injection which can be times with an upcoming B12 shot.  We also discussed adverse effects and to let us know if she becomes pregnant in the future.  We also reviewed health maintenance today.  She will avoid live vaccines.  Looking through her vaccine history, we discussed that she can consider receiving a second COVID byivalent booster, Prevnar 20, Shingrix, and hep B.  Reviewed that we will continue to check for TB every year.  She recently had a Pap smear done, continue following with OB/GYN.  I also recommended that she discuss with her PCP about having a skin check.    B12 Deficiency: Last B12 level was low.  She will be meeting with nurse on Friday for her first B12 shot.  We will recheck her B12 level with future Stelara maintenance labs.     Mood: Patient is going to be working with a mental health specialist, at this time I recommended that she continue current regimen, but could consider in the future switching her bupropion to extended release and increasing the dose.    PLAN:                            1.  Hep C antibody added to labs from 4/28/2023  2.  Vaccines to consider: COVID byivalent booster, Prevnar 20, Shingrix, and hep B    Follow-up: via [facility] after starting Stelara (IV approval still pending)    SUBJECTIVE/OBJECTIVE:                          Arleen Owusu is a 27 year old female called for an  initial visit. She was referred to me from Dr. Jacobs.     Reason for visit:  Stelara discussion   Medication Adherence/Access: no issues reported    Crohn's colitis: Met with Dr. Jacobs on 4/27/23 - at the time was having sx of recurrent flare and elevated fecal calpro. Continues mesalamine (Apriso ER) 0.375 g x4 (1.5 g) daily and is now on budesonide EC 3 mg x3 (9mg) daily. Per Dr. Jacobs, current regimen is not enough to control her disease therefore given moderate disease it was recommended to start Stelara. Budesonide taper: 3 mg x3 (9mg) daily (for 4 weeks, then 6 mg x 2 weeks then 3 mg for 2 weeks then stop).   PA for subcutaneous was approved.   PA for IV pending   Baseline labs done 4/28/23  Today she reports that she has been really fatigued. GI symptoms better since on budesonide.     IBD Health Care Maintenance:  Vaccinations: Due to the immunosuppression in this patient, I would not advise administration of live vaccines such as varicella/VZV, intranasal influenza, MMR, or yellow fever vaccine (if traveling).    -- Influenza (every year): last 10/18/22  -- TdaP (every 10 years):  Last on 12/21/15  -- Pneumococcal Pneumonia    Prevnar-13: not on file   Pneumovax-23:  not on file   Prevnar-20: not on file  -- COVID-19: 3/24/21, 4/21/21, 12/29/21, bivalent 10/18/22.  -- Yearly assessment for latent Tb (verbal screening and exam, PPD or QuantiFERON-Tb testing)      result: negative on 4/28/23    One time confirmation of immunity or serologies:  -- Hepatitis A (serologies or immunizations): no serology on file, immunization done in 2007 and 2008.   -- Hepatitis B (serologies or immunizations): see serology below, immunizations done 1995 and 1996x2  -- Varicella/Zoster    Varicella: not on file    Zoster: not on file   -- MMR: 1997, 2000  -- HPV (all aged 18-26): [id number]  -- Meningococcal meningitis (all patients at risk for meningitis): 2007, 2013    Pre-Biologic Screening:  -- Hep B Surface Antibody  negative on 2/4/22  -- Hep B Surface Antigen non-reactive on 2/4/22  -- Hep B Core Antibody negative on 2/4/22  -- Hep C Antibody - not done     Bone mineral density screening   -- Recommend all patients supplement with calcium and vitamin D  -- **Given prior steroid use recommend DEXA if not already done. She is taking a calcium supplement.   No Vitamin D level on file     Cancer Screening:  Colon cancer screening:  Repeat colonoscopy in 4 years for surveillance.    Cervical cancer screening: Per OBGYN  Has an IUD - last saw OBGYN 1/18/23    Skin cancer screening: Annual visual exam of skin by dermatologist since patient is immunocompromised    Depression Screening:  See below.     Misc:  -- Avoid tobacco use  -- Avoid NSAIDs as there is potentially a 25% chance of causing an IBD flare    B12 Deficiency: Was recommended to start on injections - she prefers to come to the clinic for the b12 injection. She has an appointment to have this done on Fri 5/5. B12 IM every 30 days was ordered  Last B12 level = 202 on 4/28/23    Mood: Continues bupropion 100 mg daily and sertraline 100 mg twice daily. She did have PCP refer her to a mental health specialist.   PHQ-9 score:        6/8/2022     4:59 PM   PHQ   PHQ-9 Total Score 2   Q9: Thoughts of better off dead/self-harm past 2 weeks Not at all         1/18/2023    10:08 AM   TOMMIE-7 SCORE   Total Score 3 (minimal anxiety)   Total Score 3       Today's Vitals: There were no vitals taken for this visit.     Allergies/ADRs: Reviewed in chart  Past Medical History: Reviewed in chart  Tobacco: She reports that she has never smoked. She has never used smokeless tobacco.  Alcohol: reviewed in chart    ----------------      I spent 30 minutes with this patient today. All changes were made via collaborative practice agreement with GLORIA Christine CNP. A copy of the visit note was provided to the patient's provider(s).    A summary of these recommendations was sent via  Mora Jacobs (Khazaeli), Pharm.D., BCACP   Medication Therapy Management Pharmacist     Telemedicine Visit Details  Type of service:  Telephone visit  Start Time: 8:02 AM  End Time: 8:32 AM     Medication Therapy Recommendations  Crohn's disease of colon (H)    Rationale: Medication requires monitoring - Needs additional monitoring   Recommendation: Order Lab - STELARA IV   Status: Accepted per CPA          Rationale: Preventive therapy - Needs additional medication therapy - Indication   Recommendation: Order Vaccine - STELARA IV - Vaccines to consider: COVID byivalent booster, Prevnar 20, Shingrix, and hep B   Status: Accepted per CPA

## 2023-05-03 ENCOUNTER — VIRTUAL VISIT (OUTPATIENT)
Dept: PHARMACY | Facility: CLINIC | Age: 28
End: 2023-05-03
Payer: COMMERCIAL

## 2023-05-03 ENCOUNTER — TELEPHONE (OUTPATIENT)
Dept: GASTROENTEROLOGY | Facility: CLINIC | Age: 28
End: 2023-05-03
Payer: COMMERCIAL

## 2023-05-03 DIAGNOSIS — E53.8 VITAMIN B12 DEFICIENCY (NON ANEMIC): ICD-10-CM

## 2023-05-03 DIAGNOSIS — F32.A DEPRESSION, UNSPECIFIED DEPRESSION TYPE: ICD-10-CM

## 2023-05-03 DIAGNOSIS — K50.10 CROHN'S DISEASE OF COLON WITHOUT COMPLICATION (H): Primary | ICD-10-CM

## 2023-05-03 PROCEDURE — 99607 MTMS BY PHARM ADDL 15 MIN: CPT | Performed by: PHARMACIST

## 2023-05-03 PROCEDURE — 99605 MTMS BY PHARM NP 15 MIN: CPT | Performed by: PHARMACIST

## 2023-05-03 NOTE — TELEPHONE ENCOUNTER
PA Initiation    Medication: Stelara vials iniated  Insurance Company: [employer] - Phone [phone] Fax [phone]  Pharmacy Filling the Rx:    Filling Pharmacy Phone:    Filling Pharmacy Fax:    Start Date: 5/3/2023    [id number]

## 2023-05-05 ENCOUNTER — PATIENT OUTREACH (OUTPATIENT)
Dept: GASTROENTEROLOGY | Facility: CLINIC | Age: 28
End: 2023-05-05

## 2023-05-05 ENCOUNTER — ALLIED HEALTH/NURSE VISIT (OUTPATIENT)
Dept: GASTROENTEROLOGY | Facility: CLINIC | Age: 28
End: 2023-05-05
Payer: COMMERCIAL

## 2023-05-05 DIAGNOSIS — K50.10 CROHN'S DISEASE OF COLON WITHOUT COMPLICATION (H): Primary | ICD-10-CM

## 2023-05-05 DIAGNOSIS — E53.8 VITAMIN B12 DEFICIENCY (NON ANEMIC): Primary | ICD-10-CM

## 2023-05-05 PROCEDURE — 99207 PR NO BILLABLE SERVICE THIS VISIT: CPT

## 2023-05-05 PROCEDURE — 96372 THER/PROPH/DIAG INJ SC/IM: CPT | Performed by: INTERNAL MEDICINE

## 2023-05-05 RX ORDER — CYANOCOBALAMIN 1000 UG/ML
1000 INJECTION, SOLUTION INTRAMUSCULAR; SUBCUTANEOUS
Status: DISCONTINUED | OUTPATIENT
Start: 2023-05-05 | End: 2024-04-29

## 2023-05-05 RX ADMIN — CYANOCOBALAMIN 1000 MCG: 1000 INJECTION, SOLUTION INTRAMUSCULAR; SUBCUTANEOUS at 08:25

## 2023-05-05 NOTE — TELEPHONE ENCOUNTER
Prior Authorization Approval    Authorization Effective Date: 5/4/2023  Authorization Expiration Date: 5/4/2024  Medication: Stelara vials approved  Approved Dose/Quantity: 2/56  Reference #: [id number]   Insurance Company: [employer] [phone] Fax [phone]  Expected CoPay:       CoPay Card Available:      Foundation Assistance Needed:    Which Pharmacy is filling the prescription (Not needed for infusion/clinic administered):    Pharmacy Notified:    Patient Notified:

## 2023-05-05 NOTE — PROGRESS NOTES
Arleen Owusu comes into clinic today at the request of Dr. Luis Alberto Jacobs. Ordering Provider for Med Injection only cyanocobalamin injection (Vitamin B12).    Completed Friday 5/5/2023 left deltoid.    Due in 1 month for another B12 injection.    This service provided today was under the supervising provider of the day Shanae Swanson PA-C, who was available if needed.    Earline Mota MA

## 2023-05-10 ENCOUNTER — PATIENT OUTREACH (OUTPATIENT)
Dept: GASTROENTEROLOGY | Facility: CLINIC | Age: 28
End: 2023-05-10
Payer: COMMERCIAL

## 2023-05-10 NOTE — TELEPHONE ENCOUNTER
Received an update from infusion [employer] that the Stelara infusion has been approved. Attempted to notify the patient via telephone, no answer. Left detailed message with direct callback number and sent [employer] message. Will monitor for infusion to be scheduled to ensure injections are ordered in appropriate timeframe.

## 2023-05-11 ENCOUNTER — TELEPHONE (OUTPATIENT)
Dept: GASTROENTEROLOGY | Facility: CLINIC | Age: 28
End: 2023-05-11
Payer: COMMERCIAL

## 2023-05-11 NOTE — TELEPHONE ENCOUNTER
"[facility] and sent mychart for patient to schedule follow up per Dr. Jacobs    Schedule:  - 3 month \"RTN IBD\" visit with Lan Trent (around 7/26/23)    AND    -6 mo \"RTN IBD\" visit with Dr. Jacobs (around 10/26/23)  "

## 2023-05-18 ENCOUNTER — NURSE TRIAGE (OUTPATIENT)
Dept: FAMILY MEDICINE | Facility: CLINIC | Age: 28
End: 2023-05-18
Payer: COMMERCIAL

## 2023-05-18 NOTE — TELEPHONE ENCOUNTER
Shontel/Covering providers-Please review and advise if agree with plan.  May close encounter if no further action needed from triage.    Patient called back:  1. Dealing with Crohn's flare up  2. No plan to harm self nor anyone else  3. Passive thoughts  4. Feels safe in living environment  5. Has not been contacted by Psychiatry scheduling    Discussed with patient option to also meet with Kei Law, while awaiting Pyschiatry appt.  Patient agreed. Video visit scheduled with Kei Law, on 5/30/23.  Visit date and time confirmed with patient.    Confirmed patient's visit with LATASHA Dan CNP, on 5/23/23.    Patient verbally contracted for safety with writer.  Encouraged patient to use crisis resource information sent via [employer], if needed, and/or welcome to call triage back.  Triage nurses are available 24/7.    Patient verbalized understanding and in agreement with plan.    Reason for Disposition    Sometimes has thoughts of suicide    Additional Information    Negative: Patient attempted suicide    Negative: Patient is threatening suicide now    Negative: Violent behavior, or threatening to physically hurt or kill someone    Negative: Patient is very confused (disoriented, slurred speech) and no other adult (e.g., friend or family member) available    Negative: Difficult to awaken or acting very confused (disoriented, slurred speech) and new-onset    Negative: Sounds like a life-threatening emergency to the triager    Negative: Suicide thoughts, threats, attempts, or questions    Negative: Questions or concerns about alcohol use, unhealthy alcohol use, binge drinking, intoxication, or withdrawal    Negative: Questions or concerns about substance use (drug use), unhealthy drug use, intoxication, or withdrawal    Negative: Anxiety is main problem or symptom    Negative: Depression and unable to do any of normal activities (e.g., self care, school, work; in comparison to baseline).    Negative: Very  strange or confused behavior    Negative: Patient sounds very sick or weak to the triager    Negative: Fever > 101 F  (38.3 C)    Protocols used: DEPRESSION-A-LEXI StoreyN, RN-BC  [facility]

## 2023-05-18 NOTE — TELEPHONE ENCOUNTER
Patient answered positively to #9 on PHQ-9.  Patient needs to be triaged.    See 4/24/23 MyChart encounter.    Left message to call back and ask to speak with an available triage nurse.    LEXI CooperN, RN-[facility]        3/21/2022     8:58 AM 6/8/2022     4:59 PM 5/18/2023    10:27 AM   PHQ   PHQ-9 Total Score 4 2 11   Q9: Thoughts of better off dead/self-harm past 2 weeks Not at all Not at all Several days   F/U: Thoughts of suicide or self-harm   No   F/U: Safety concerns   No

## 2023-05-23 ENCOUNTER — OFFICE VISIT (OUTPATIENT)
Dept: FAMILY MEDICINE | Facility: CLINIC | Age: 28
End: 2023-05-23
Attending: NURSE PRACTITIONER
Payer: COMMERCIAL

## 2023-05-23 VITALS
HEIGHT: 67 IN | DIASTOLIC BLOOD PRESSURE: 70 MMHG | RESPIRATION RATE: 20 BRPM | TEMPERATURE: 97.9 F | SYSTOLIC BLOOD PRESSURE: 103 MMHG | HEART RATE: 64 BPM | WEIGHT: 156.4 LBS | BODY MASS INDEX: 24.55 KG/M2 | OXYGEN SATURATION: 96 %

## 2023-05-23 DIAGNOSIS — Z13.220 SCREENING FOR HYPERLIPIDEMIA: ICD-10-CM

## 2023-05-23 DIAGNOSIS — Z11.4 SCREENING FOR HIV (HUMAN IMMUNODEFICIENCY VIRUS): ICD-10-CM

## 2023-05-23 DIAGNOSIS — Z12.4 CERVICAL CANCER SCREENING: ICD-10-CM

## 2023-05-23 DIAGNOSIS — Z23 ENCOUNTER FOR IMMUNIZATION: ICD-10-CM

## 2023-05-23 DIAGNOSIS — F32.4 MAJOR DEPRESSIVE DISORDER WITH SINGLE EPISODE, IN PARTIAL REMISSION (H): ICD-10-CM

## 2023-05-23 DIAGNOSIS — N91.5 OLIGOMENORRHEA, UNSPECIFIED TYPE: ICD-10-CM

## 2023-05-23 DIAGNOSIS — Z00.00 ROUTINE GENERAL MEDICAL EXAMINATION AT A HEALTH CARE FACILITY: Primary | ICD-10-CM

## 2023-05-23 DIAGNOSIS — Z13.1 ENCOUNTER FOR SCREENING FOR DIABETES MELLITUS: ICD-10-CM

## 2023-05-23 LAB
ANION GAP SERPL CALCULATED.3IONS-SCNC: 12 MMOL/L (ref 7–15)
BUN SERPL-MCNC: 10.8 MG/DL (ref 6–20)
CALCIUM SERPL-MCNC: 9.1 MG/DL (ref 8.6–10)
CHLORIDE SERPL-SCNC: 105 MMOL/L (ref 98–107)
CHOLEST SERPL-MCNC: 187 MG/DL
CREAT SERPL-MCNC: 0.79 MG/DL (ref 0.51–0.95)
DEPRECATED HCO3 PLAS-SCNC: 24 MMOL/L (ref 22–29)
ERYTHROCYTE [DISTWIDTH] IN BLOOD BY AUTOMATED COUNT: 13.8 % (ref 10–15)
FERRITIN SERPL-MCNC: 53 NG/ML (ref 6–175)
GFR SERPL CREATININE-BSD FRML MDRD: >90 ML/MIN/1.73M2
GLUCOSE SERPL-MCNC: 77 MG/DL (ref 70–99)
HCT VFR BLD AUTO: 39 % (ref 35–47)
HCV AB SERPL QL IA: NONREACTIVE
HDLC SERPL-MCNC: 65 MG/DL
HGB BLD-MCNC: 12.8 G/DL (ref 11.7–15.7)
HIV 1+2 AB+HIV1 P24 AG SERPL QL IA: NONREACTIVE
LDLC SERPL CALC-MCNC: 95 MG/DL
MCH RBC QN AUTO: 27.9 PG (ref 26.5–33)
MCHC RBC AUTO-ENTMCNC: 32.8 G/DL (ref 31.5–36.5)
MCV RBC AUTO: 85 FL (ref 78–100)
NONHDLC SERPL-MCNC: 122 MG/DL
PLATELET # BLD AUTO: 248 10E3/UL (ref 150–450)
POTASSIUM SERPL-SCNC: 4.1 MMOL/L (ref 3.4–5.3)
RBC # BLD AUTO: 4.59 10E6/UL (ref 3.8–5.2)
SODIUM SERPL-SCNC: 141 MMOL/L (ref 136–145)
TRIGL SERPL-MCNC: 133 MG/DL
TSH SERPL DL<=0.005 MIU/L-ACNC: 1.24 UIU/ML (ref 0.3–4.2)
WBC # BLD AUTO: 8.9 10E3/UL (ref 4–11)

## 2023-05-23 PROCEDURE — 99214 OFFICE O/P EST MOD 30 MIN: CPT | Mod: 25 | Performed by: NURSE PRACTITIONER

## 2023-05-23 PROCEDURE — 90677 PCV20 VACCINE IM: CPT | Performed by: NURSE PRACTITIONER

## 2023-05-23 PROCEDURE — 36415 COLL VENOUS BLD VENIPUNCTURE: CPT | Performed by: PHARMACIST

## 2023-05-23 PROCEDURE — 86803 HEPATITIS C AB TEST: CPT | Performed by: PHARMACIST

## 2023-05-23 PROCEDURE — 90471 IMMUNIZATION ADMIN: CPT | Performed by: NURSE PRACTITIONER

## 2023-05-23 PROCEDURE — 80061 LIPID PANEL: CPT | Performed by: NURSE PRACTITIONER

## 2023-05-23 PROCEDURE — 84443 ASSAY THYROID STIM HORMONE: CPT | Performed by: NURSE PRACTITIONER

## 2023-05-23 PROCEDURE — G0145 SCR C/V CYTO,THINLAYER,RESCR: HCPCS | Performed by: NURSE PRACTITIONER

## 2023-05-23 PROCEDURE — 82728 ASSAY OF FERRITIN: CPT | Performed by: NURSE PRACTITIONER

## 2023-05-23 PROCEDURE — 96127 BRIEF EMOTIONAL/BEHAV ASSMT: CPT | Performed by: NURSE PRACTITIONER

## 2023-05-23 PROCEDURE — 99395 PREV VISIT EST AGE 18-39: CPT | Mod: 25 | Performed by: NURSE PRACTITIONER

## 2023-05-23 PROCEDURE — 87389 HIV-1 AG W/HIV-1&-2 AB AG IA: CPT | Performed by: NURSE PRACTITIONER

## 2023-05-23 PROCEDURE — 80048 BASIC METABOLIC PNL TOTAL CA: CPT | Performed by: NURSE PRACTITIONER

## 2023-05-23 PROCEDURE — 85027 COMPLETE CBC AUTOMATED: CPT | Performed by: NURSE PRACTITIONER

## 2023-05-23 RX ORDER — BUPROPION HYDROCHLORIDE 150 MG/1
150 TABLET ORAL EVERY MORNING
Qty: 90 TABLET | Refills: 1 | Status: SHIPPED | OUTPATIENT
Start: 2023-05-23 | End: 2024-07-15

## 2023-05-23 ASSESSMENT — ENCOUNTER SYMPTOMS
HEADACHES: 0
HEMATOCHEZIA: 0
ABDOMINAL PAIN: 1
BREAST MASS: 0
PALPITATIONS: 0
NERVOUS/ANXIOUS: 0
MYALGIAS: 0
DYSURIA: 0
HEARTBURN: 0
EYE PAIN: 0
CHILLS: 0
COUGH: 0
SORE THROAT: 0
DIZZINESS: 0
CONSTIPATION: 0
FEVER: 0
WEAKNESS: 0
PARESTHESIAS: 0
HEMATURIA: 0
DIARRHEA: 0
SHORTNESS OF BREATH: 0
NAUSEA: 0
FREQUENCY: 0

## 2023-05-23 ASSESSMENT — PATIENT HEALTH QUESTIONNAIRE - PHQ9
SUM OF ALL RESPONSES TO PHQ QUESTIONS 1-9: 5
10. IF YOU CHECKED OFF ANY PROBLEMS, HOW DIFFICULT HAVE THESE PROBLEMS MADE IT FOR YOU TO DO YOUR WORK, TAKE CARE OF THINGS AT HOME, OR GET ALONG WITH OTHER PEOPLE: SOMEWHAT DIFFICULT
SUM OF ALL RESPONSES TO PHQ QUESTIONS 1-9: 5

## 2023-05-23 NOTE — NURSING NOTE
Prior to immunization administration, verified patients identity using patient s name and date of birth. Please see Immunization Activity for additional information.     Screening Questionnaire for Adult Immunization    Are you sick today?   No   Do you have allergies to medications, food, a vaccine component or latex?   No   Have you ever had a serious reaction after receiving a vaccination?   No   Do you have a long-term health problem with heart, lung, kidney, or metabolic disease (e.g., diabetes), asthma, a blood disorder, no spleen, complement component deficiency, a cochlear implant, or a spinal fluid leak?  Are you on long-term aspirin therapy?   No   Do you have cancer, leukemia, HIV/AIDS, or any other immune system problem?   No   Do you have a parent, brother, or sister with an immune system problem?   No   In the past 3 months, have you taken medications that affect  your immune system, such as prednisone, other steroids, or anticancer drugs; drugs for the treatment of rheumatoid arthritis, Crohn s disease, or psoriasis; or have you had radiation treatments?   No   Have you had a seizure, or a brain or other nervous system problem?   No   During the past year, have you received a transfusion of blood or blood    products, or been given immune (gamma) globulin or antiviral drug?   No   For women: Are you pregnant or is there a chance you could become       pregnant during the next month?   No   Have you received any vaccinations in the past 4 weeks?   No     Immunization questionnaire answers were all negative.      Injection of PCV 20 given by Laurita Cancino MA. Patient instructed to remain in clinic for 15 minutes afterwards, and to report any adverse reactions.     Screening performed by Laurita Cancino MA on 5/23/2023 at 9:01 AM.

## 2023-05-23 NOTE — PROGRESS NOTES
SUBJECTIVE:   CC: Arleen is an 27 year old who presents for preventive health visit.       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     In addition to the preventive visit,  34 minutes of the appointment were spent evaluating and developing a treatment plan for her additional concern(s).        Healthy Habits:     Getting at least 3 servings of Calcium per day:  Yes    Bi-annual eye exam:  NO    Dental care twice a year:  NO    Sleep apnea or symptoms of sleep apnea:  None    Diet:  Regular (no restrictions)    Frequency of exercise:  None    Taking medications regularly:  Yes    Medication side effects:  None    PHQ-2 Total Score: 2    Additional concerns today:  No    PROBLEMS TO ADD ON...    Have you ever done Advance Care Planning? (For example, a Health Directive, POLST, or a discussion with a medical provider or your loved ones about your wishes): No, advance care planning information given to patient to review.  Patient declined advance care planning discussion at this time.    Social History     Tobacco Use     Smoking status: Never     Smokeless tobacco: Never   Vaping Use     Vaping status: Not on file   Substance Use Topics     Alcohol use: Not on file             5/23/2023     7:18 AM   Alcohol Use   Prescreen: >3 drinks/day or >7 drinks/week? Not Applicable          View : No data to display.              Reviewed orders with patient.  Reviewed health maintenance and updated orders accordingly - Yes  Lab work is in process  Labs reviewed in EPIC  BP  Readings from Last 3 Encounters:   23 103/70   23 98/60   23 101/70    Wt Readings from Last 3 Encounters:   23 70.9 kg (156 lb 6.4 oz)   23 68 kg (150 lb)   23 68 kg (150 lb)                Breast Cancer Screenin/23/2023     7:18 AM   Breast CA Risk Assessment (FHS-7)   Do you have a family history of breast, colon, or ovarian cancer? No / Unknown       click delete button to remove this line now  Patient under 40 years of age: Routine Mammogram Screening not recommended.   Pertinent mammograms are reviewed under the imaging tab.    History of abnormal Pap smear: NO - age 21-29 PAP every 3 years recommended      2023     8:37 AM   PAP / HPV   PAP Negative for Intraepithelial Lesion or Malignancy (NILM)       Reviewed and updated as needed this visit by clinical staff   Tobacco  Allergies  Meds  Problems  Med Hx  Surg Hx  Fam Hx          Reviewed and updated as needed this visit by Provider   Tobacco  Allergies  Meds  Problems  Med Hx  Surg Hx  Fam Hx             Review of Systems   Constitutional: Negative for chills and fever.   HENT: Positive for congestion. Negative for ear pain, hearing loss and sore throat.    Eyes: Negative for pain and visual disturbance.   Respiratory: Negative for cough and shortness of breath.    Cardiovascular: Negative for chest pain, palpitations and peripheral edema.   Gastrointestinal: Positive for abdominal pain. Negative for constipation, diarrhea, heartburn, hematochezia and nausea.   Breasts:  Negative for tenderness, breast mass and discharge.   Genitourinary: Negative for dysuria, frequency, genital sores, hematuria, pelvic pain, urgency, vaginal bleeding and vaginal discharge.   Musculoskeletal: Negative for myalgias.   Skin: Negative for rash.   Neurological: Negative for dizziness, weakness, headaches and paresthesias.   Psychiatric/Behavioral: Negative for mood changes. The patient is not nervous/anxious.        "  OBJECTIVE:   /70 (BP Location: Right arm, Patient Position: Sitting, Cuff Size: Adult Regular)   Pulse 64   Temp 97.9  F (36.6  C) (Temporal)   Resp 20   Ht 1.702 m (5' 7.01\")   Wt 70.9 kg (156 lb 6.4 oz)   LMP 05/06/2023   SpO2 96%   BMI 24.49 kg/m    Physical Exam  GENERAL: healthy, alert and no distress  EYES: Eyes grossly normal to inspection, PERRL and conjunctivae and sclerae normal  HENT: ear canals and TM's normal, nose and mouth without ulcers or lesions  NECK: no adenopathy, no asymmetry, masses, or scars and thyroid normal to palpation  RESP: lungs clear to auscultation - no rales, rhonchi or wheezes  BREAST: normal without masses, tenderness or nipple discharge and no palpable axillary masses or adenopathy  CV: regular rate and rhythm, normal S1 S2, no S3 or S4, no murmur, click or rub, no peripheral edema and peripheral pulses strong  ABDOMEN: soft, nontender, no hepatosplenomegaly, no masses and bowel sounds normal   (female): normal female external genitalia, normal urethral meatus, vaginal mucosa pink, moist, well rugated, and normal cervix/adnexa/uterus without masses or discharge  MS: no gross musculoskeletal defects noted, no edema  SKIN: no suspicious lesions or rashes  NEURO: Normal strength and tone, mentation intact and speech normal  PSYCH: mentation appears normal, affect normal/bright    Diagnostic Test Results:  Labs reviewed in Epic    ASSESSMENT/PLAN:   Arleen was seen today for physical.    Diagnoses and all orders for this visit:    Routine general medical examination at a health care facility  Preventative exam w/no abnormalities and/or concerns listed in diagnoses; discussed health maintenance screenings including prostate, breast, cervical and colorectal ca screenings related to gender;  reviewed and reconciled medication, medical history and patient related health concerns  Plan: obtain metabolic labs    -     PRIMARY CARE FOLLOW-UP SCHEDULING    Screening for HIV " (human immunodeficiency virus)  -     HIV Antigen Antibody Combo; Future  -     HIV Antigen Antibody Combo    Cervical cancer screening  Pap completed; no abnormal findings; pending result follow up per guidelines; Tolerated screening well w/o pain  -     Pap Screen reflex to HPV if ASCUS - recommend age 25 - 29  -     US Pelvic Complete with Transvaginal; Future  -     TSH with free T4 reflex; Future  -     Ferritin; Future  -     Ferritin  -     CBC with platelets  -     Cancel: HPV Hold (Lab Only)    Screening for hyperlipidemia  -     Lipid Profile; Future    Encounter for screening for diabetes mellitus  -     Basic metabolic panel; Future  -     Basic metabolic panel    Encounter for immunization  -     Pneumococcal 20 Valent Conjugate (Prevnar 20)    Major depressive disorder with single episode, in partial remission (H)      6/8/2022     4:59 PM 5/18/2023    10:27 AM 5/23/2023     7:15 AM   PHQ   PHQ-9 Total Score 2 11 5   Q9: Thoughts of better off dead/self-harm past 2 weeks Not at all Several days Not at all   F/U: Thoughts of suicide or self-harm  No    F/U: Safety concerns  No    discussed increase in bupropion 100 mg to 150 mg daily; follow-up in 6 weeks  -     buPROPion (WELLBUTRIN XL) 150 MG 24 hr tablet; Take 1 tablet (150 mg) by mouth every morning  -     TSH with free T4 reflex; Future  -     PRIMARY CARE FOLLOW-UP SCHEDULING; Future  -     TSH with free T4 reflex    Oligomenorrhea, unspecified type  -     US Pelvic Complete with Transvaginal; Future  -     TSH with free T4 reflex  -     Ferritin  -     CBC with platelets        Patient has been advised of split billing requirements and indicates understanding: Yes      COUNSELING:  Reviewed preventive health counseling, as reflected in patient instructions        She reports that she has never smoked. She has never used smokeless tobacco.      GLORIA Christine [facility]  Answers for HPI/ROS submitted by the  patient on 5/23/2023  If you checked off any problems, how difficult have these problems made it for you to do your work, take care of things at home, or get along with other people?: Somewhat difficult  PHQ9 TOTAL SCORE: 5

## 2023-05-24 NOTE — RESULT ENCOUNTER NOTE
Yieson Bacon,    Your recent results are normal. Any results slightly above or below the normal range have been evaluated as clinically stable.     Let me know if you have any questions or concerns.    Sincerely,  GLORIA Christine CNP

## 2023-05-24 NOTE — RESULT ENCOUNTER NOTE
Yeison Bacon,    Your recent results are normal. Any results slightly above or below the normal range have been evaluated as clinically stable.     Let me know if you have any questions or concerns.    Sincerely,  GLORIA Christine CNP

## 2023-05-25 LAB
BKR LAB AP GYN ADEQUACY: NORMAL
BKR LAB AP GYN INTERPRETATION: NORMAL
BKR LAB AP HPV REFLEX: NORMAL
BKR LAB AP LMP: NORMAL
BKR LAB AP PREVIOUS ABNORMAL: NORMAL
PATH REPORT.COMMENTS IMP SPEC: NORMAL
PATH REPORT.COMMENTS IMP SPEC: NORMAL
PATH REPORT.RELEVANT HX SPEC: NORMAL

## 2023-06-02 ENCOUNTER — ALLIED HEALTH/NURSE VISIT (OUTPATIENT)
Dept: GASTROENTEROLOGY | Facility: CLINIC | Age: 28
End: 2023-06-02
Payer: COMMERCIAL

## 2023-06-02 DIAGNOSIS — E53.8 VITAMIN B12 DEFICIENCY (NON ANEMIC): Primary | ICD-10-CM

## 2023-06-02 PROCEDURE — 96372 THER/PROPH/DIAG INJ SC/IM: CPT | Performed by: INTERNAL MEDICINE

## 2023-06-02 PROCEDURE — 99207 PR NO CHARGE NURSE ONLY: CPT

## 2023-06-02 RX ADMIN — CYANOCOBALAMIN 1000 MCG: 1000 INJECTION, SOLUTION INTRAMUSCULAR; SUBCUTANEOUS at 08:06

## 2023-06-02 NOTE — NURSING NOTE
Arleen Owusu comes into clinic today at the request of Luis Alberto Jacobs Ordering Provider for Med Injection only B12.      This service provided today was under the supervising provider of the day Elise Costa, who was available if needed.    Amy Marinelli LPN

## 2023-06-05 ENCOUNTER — MYC MEDICAL ADVICE (OUTPATIENT)
Dept: GASTROENTEROLOGY | Facility: CLINIC | Age: 28
End: 2023-06-05
Payer: COMMERCIAL

## 2023-06-08 ENCOUNTER — INFUSION THERAPY VISIT (OUTPATIENT)
Dept: INFUSION THERAPY | Facility: CLINIC | Age: 28
End: 2023-06-08
Attending: INTERNAL MEDICINE
Payer: COMMERCIAL

## 2023-06-08 VITALS
WEIGHT: 159 LBS | HEART RATE: 75 BPM | SYSTOLIC BLOOD PRESSURE: 98 MMHG | RESPIRATION RATE: 16 BRPM | OXYGEN SATURATION: 97 % | BODY MASS INDEX: 24.9 KG/M2 | DIASTOLIC BLOOD PRESSURE: 65 MMHG | TEMPERATURE: 98.7 F

## 2023-06-08 DIAGNOSIS — K50.10 CROHN'S DISEASE OF COLON WITHOUT COMPLICATION (H): Primary | ICD-10-CM

## 2023-06-08 PROCEDURE — 96365 THER/PROPH/DIAG IV INF INIT: CPT

## 2023-06-08 PROCEDURE — 258N000003 HC RX IP 258 OP 636: Performed by: INTERNAL MEDICINE

## 2023-06-08 PROCEDURE — 250N000011 HC RX IP 250 OP 636: Performed by: INTERNAL MEDICINE

## 2023-06-08 RX ORDER — DIPHENHYDRAMINE HYDROCHLORIDE 50 MG/ML
50 INJECTION INTRAMUSCULAR; INTRAVENOUS
Status: CANCELLED
Start: 2023-06-08

## 2023-06-08 RX ORDER — EPINEPHRINE 1 MG/ML
0.3 INJECTION, SOLUTION INTRAMUSCULAR; SUBCUTANEOUS EVERY 5 MIN PRN
Status: CANCELLED | OUTPATIENT
Start: 2023-06-08

## 2023-06-08 RX ORDER — ALBUTEROL SULFATE 0.83 MG/ML
2.5 SOLUTION RESPIRATORY (INHALATION)
Status: CANCELLED | OUTPATIENT
Start: 2023-06-08

## 2023-06-08 RX ORDER — ALBUTEROL SULFATE 90 UG/1
1-2 AEROSOL, METERED RESPIRATORY (INHALATION)
Status: CANCELLED
Start: 2023-06-08

## 2023-06-08 RX ORDER — MEPERIDINE HYDROCHLORIDE 25 MG/ML
25 INJECTION INTRAMUSCULAR; INTRAVENOUS; SUBCUTANEOUS EVERY 30 MIN PRN
Status: CANCELLED | OUTPATIENT
Start: 2023-06-08

## 2023-06-08 RX ORDER — METHYLPREDNISOLONE SODIUM SUCCINATE 125 MG/2ML
125 INJECTION, POWDER, LYOPHILIZED, FOR SOLUTION INTRAMUSCULAR; INTRAVENOUS
Status: CANCELLED
Start: 2023-06-08

## 2023-06-08 RX ADMIN — USTEKINUMAB 390 MG: 130 SOLUTION INTRAVENOUS at 15:59

## 2023-06-08 NOTE — PROGRESS NOTES
Post Infusion Assessment:  Patient tolerated infusion without incident.  Blood return noted pre and post infusion.  Site patent and intact, free from redness, edema or discomfort.  No evidence of extravasations.  Access discontinued per protocol.

## 2023-06-08 NOTE — PROGRESS NOTES
Infusion Nursing Note:  Arleen Owusu presents today for Stelera.    Patient seen by provider today: No   present during visit today: Not Applicable.    Note: Patient given loading dose of Stelera. She has follow up planned with her provider next month to go over teaching on self injection.      Intravenous Access:  Peripheral IV placed.    Treatment Conditions:  Biological Infusion Checklist:  ~~~ NOTE: If the patient answers yes to any of the questions below, hold the infusion and contact ordering provider or on-call provider.    1. Have you recently had an elevated temperature, fever, chills, productive cough, coughing for 3 weeks or longer or hemoptysis,  abnormal vital signs, night sweats,  chest pain or have you noticed a decrease in your appetite, unexplained weight loss or fatigue? No  2. Do you have any open wounds or new incisions? No  3. Do you have any upcoming hospitalizations or surgeries? Does not include esophagogastroduodenoscopy, colonoscopy, endoscopic retrograde cholangiopancreatography (ERCP), endoscopic ultrasound (EUS), dental procedures or joint aspiration/steroid injections No  4. Do you currently have any signs of illness or infection or are you on any antibiotics? No  5. Have you had any new, sudden or worsening abdominal pain? No  6. Have you or anyone in your household received a live vaccination in the past 4 weeks? Please note: No live vaccines while on biologic/chemotherapy until 6 months after the last treatment. Patient can receive the flu vaccine (shot only), pneumovax and the Covid vaccine. It is optimal for the patient to get these vaccines mid cycle, but they can be given at any time as long as it is not on the day of the infusion. No  7. Have you recently been diagnosed with any new nervous system diseases (ie. Multiple sclerosis, Guillain [place], seizures, neurological changes) or cancer diagnosis? Are you on any form of radiation or chemotherapy? No  8. Are you  "pregnant or breast feeding or do you have plans of pregnancy in the future? No  Administrations This Visit     ustekinumab (STELARA) 390 mg in sodium chloride 0.9 % 250 mL infusion     Admin Date  06/08/2023 Action  $New Bag Dose  390 mg Rate  250 mL/hr Route  Intravenous Administered By  Patricia Rueda, SONAL              Vital signs:  Temp: 98.7  F (37.1  C)   BP: 100/66 Pulse: 76   Resp: 16 SpO2: 97 %       Weight: 72.1 kg (159 lb)  Estimated body mass index is 24.9 kg/m  as calculated from the following:    Height as of 5/23/23: 1.702 m (5' 7.01\").    Weight as of this encounter: 72.1 kg (159 lb).            "

## 2023-06-09 ENCOUNTER — PATIENT OUTREACH (OUTPATIENT)
Dept: GASTROENTEROLOGY | Facility: CLINIC | Age: 28
End: 2023-06-09
Payer: COMMERCIAL

## 2023-06-09 DIAGNOSIS — K50.10 CROHN'S DISEASE OF COLON WITHOUT COMPLICATION (H): Primary | ICD-10-CM

## 2023-06-09 NOTE — TELEPHONE ENCOUNTER
Stelara infusion completed on 6/8/23. Stelara vials with appropriate supplies ordered with instructions to start 8/3/23, and sent to specialty pharmacy. Attempted to contact the patient to discuss, no answer. Left detailed message requesting callback or response to [employer] message.

## 2023-06-16 NOTE — TELEPHONE ENCOUNTER
Attempted to contact the patient to inquire if medication has been delivered, and also to confirm nurse visit time on 8/3. Left detailed voicemail, requesting mychart message or callback.

## 2023-06-18 ENCOUNTER — MYC MEDICAL ADVICE (OUTPATIENT)
Dept: GASTROENTEROLOGY | Facility: CLINIC | Age: 28
End: 2023-06-18
Payer: COMMERCIAL

## 2023-06-19 NOTE — TELEPHONE ENCOUNTER
Luis Alberto Jacobs MD Broich, Samantha, RN  Phone Number: [phone]     Please check in with patient. Find out if any other symptoms or fever or URI or anything.     If otherwise doing well let's keep an eye on symptoms.     Sometimes people can feel fatigued after the first infusions. Should get better.     Let's keep an eye on the lymph node too. If becomes more prominent then have her see her PCP and keep us updated.     Thanks!   J     Attempted to contact the patient, no answer. Left detailed message and also sent [employer] message.

## 2023-06-22 ENCOUNTER — HOSPITAL ENCOUNTER (OUTPATIENT)
Dept: ULTRASOUND IMAGING | Facility: HOSPITAL | Age: 28
Discharge: HOME | End: 2023-06-22
Attending: NURSE PRACTITIONER | Admitting: NURSE PRACTITIONER
Payer: COMMERCIAL

## 2023-06-22 DIAGNOSIS — N91.5 OLIGOMENORRHEA, UNSPECIFIED TYPE: ICD-10-CM

## 2023-06-22 DIAGNOSIS — Z12.4 CERVICAL CANCER SCREENING: ICD-10-CM

## 2023-06-22 PROCEDURE — 76830 TRANSVAGINAL US NON-OB: CPT

## 2023-07-03 ENCOUNTER — ALLIED HEALTH/NURSE VISIT (OUTPATIENT)
Dept: GASTROENTEROLOGY | Facility: CLINIC | Age: 28
End: 2023-07-03
Payer: COMMERCIAL

## 2023-07-03 DIAGNOSIS — E53.8 VITAMIN B12 DEFICIENCY (NON ANEMIC): Primary | ICD-10-CM

## 2023-07-03 DIAGNOSIS — K50.10 CROHN'S DISEASE OF COLON WITHOUT COMPLICATION (H): ICD-10-CM

## 2023-07-03 PROCEDURE — 99207 PR NO CHARGE NURSE ONLY: CPT

## 2023-07-03 PROCEDURE — 96372 THER/PROPH/DIAG INJ SC/IM: CPT | Performed by: INTERNAL MEDICINE

## 2023-07-03 RX ORDER — CYANOCOBALAMIN 1000 UG/ML
1000 INJECTION, SOLUTION INTRAMUSCULAR; SUBCUTANEOUS ONCE
Status: COMPLETED | OUTPATIENT
Start: 2023-07-03 | End: 2023-07-03

## 2023-07-03 RX ADMIN — CYANOCOBALAMIN 1000 MCG: 1000 INJECTION, SOLUTION INTRAMUSCULAR; SUBCUTANEOUS at 08:33

## 2023-07-03 NOTE — PROGRESS NOTES
Arleen Owusu comes into clinic today at the request of cyanocobalamin (vitamin b12) 1000mcg Ordering Provider Dr. Luis Alberto Jacobs for vitamin b12 deficiency (E53.8)    This service provided today was under the supervising provider of the day Dr. Elliott, who was available if needed.    Earline Mota MA

## 2023-07-13 ENCOUNTER — PATIENT OUTREACH (OUTPATIENT)
Dept: GASTROENTEROLOGY | Facility: CLINIC | Age: 28
End: 2023-07-13
Payer: COMMERCIAL

## 2023-07-13 NOTE — TELEPHONE ENCOUNTER
Attempted to contact the patient to inquire if the Stelara medication has been received in the mail, or if delivery has been set up. No answer, left detailed message requesting callback. Also sent [employer] message.

## 2023-08-03 ENCOUNTER — ALLIED HEALTH/NURSE VISIT (OUTPATIENT)
Dept: GASTROENTEROLOGY | Facility: CLINIC | Age: 28
End: 2023-08-03
Payer: COMMERCIAL

## 2023-08-03 DIAGNOSIS — K50.10 CROHN'S DISEASE OF COLON WITHOUT COMPLICATION (H): Primary | ICD-10-CM

## 2023-08-03 PROCEDURE — 99207 PR NO BILLABLE SERVICE THIS VISIT: CPT

## 2023-08-03 NOTE — PROGRESS NOTES
Clinic visit today for first subcutaneous Stelara injection. The patient brought her own supply of medication and needle/syringes to this appointment.     Education provided on medication inspection, temperature control, travel guidelines, administration of medication, sharps disposal, and refill protocol.      Patient successfully self-administered Stelara dose under supervision into the right lower quadrant of the abdomen.      Reviewed symptoms of a reaction and monitored patient for 15 minutes after injection. No reaction noted.     All questions answered at this time. Patient feels comfortable administering Stelara injections independently at home for future doses, but will reach out if anything changes.     Thank you,   Gladis Lazcano RN GI Care Coordinator for Dr. Luis Alberto Jacobs   [phone]

## 2023-08-09 ENCOUNTER — MYC MEDICAL ADVICE (OUTPATIENT)
Dept: GASTROENTEROLOGY | Facility: CLINIC | Age: 28
End: 2023-08-09
Payer: COMMERCIAL

## 2023-08-14 NOTE — TELEPHONE ENCOUNTER
Called to remind patient of their upcoming appointment with our GI clinic, on Tuesday 8/22/2023 at 10:00AM with Lan Trent. This appointment is scheduled as a video visit. You will receive a call approximately 30 minutes prior to check you in, you must be in [place] for this visit., if your appointment is virtual (video or telephone) you need to be in [place] for the visit. To reschedule or cancel appointment patient needs to call [phone] option 1.  To confirm appointment patient advised to call back.     Earline Mota MA

## 2023-08-22 ENCOUNTER — VIRTUAL VISIT (OUTPATIENT)
Dept: GASTROENTEROLOGY | Facility: CLINIC | Age: 28
End: 2023-08-22
Payer: COMMERCIAL

## 2023-08-22 DIAGNOSIS — K50.10 CROHN'S DISEASE OF COLON WITHOUT COMPLICATION (H): Primary | ICD-10-CM

## 2023-08-22 PROCEDURE — 99214 OFFICE O/P EST MOD 30 MIN: CPT | Mod: VID | Performed by: PHYSICIAN ASSISTANT

## 2023-08-22 ASSESSMENT — ENCOUNTER SYMPTOMS
POLYDIPSIA: 0
DEPRESSION: 0
PANIC: 0
DECREASED CONCENTRATION: 0
FEVER: 0
DECREASED APPETITE: 0
WEIGHT LOSS: 0
FATIGUE: 1
PANIC: 0
HALLUCINATIONS: 0
CHILLS: 0
POLYDIPSIA: 0
ALTERED TEMPERATURE REGULATION: 0
INCREASED ENERGY: 0
DEPRESSION: 0
POLYPHAGIA: 0
ALTERED TEMPERATURE REGULATION: 0
DECREASED CONCENTRATION: 0
DECREASED APPETITE: 0
NERVOUS/ANXIOUS: 0
POLYPHAGIA: 0
NIGHT SWEATS: 1
CHILLS: 0
WEIGHT GAIN: 0
HALLUCINATIONS: 0
INCREASED ENERGY: 0
FATIGUE: 1
NERVOUS/ANXIOUS: 0
INSOMNIA: 1
WEIGHT LOSS: 0
NIGHT SWEATS: 1
WEIGHT GAIN: 0
INSOMNIA: 1
FEVER: 0

## 2023-08-22 ASSESSMENT — PAIN SCALES - GENERAL: PAINLEVEL: NO PAIN (0)

## 2023-08-22 NOTE — LETTER
8/22/2023         RE: Arleen Owusu  [address]  [place] [address]        Dear Colleague,    Thank you for referring your patient, Arleen Owusu, to the [facility] [place]. Please see a copy of my visit note below.    IBD CLINIC VISIT    CC/REFERRING MD:  Luis Alberto Jacobs    REASON FOR CONSULTATION: follow up Crohn's Colitis    ASSESSMENT/PLAN:    1. Crohn's colitis - moderate.   Current medication: stelara induction   Current clinical disease activity: remission   Last endoscopic disease activity: Colonoscopy 4/2023 - CDES - 5. Alphous ulcers, erythema and loss of vasculature cecum and proximal ascending  Enterography: MRE normal 3/322  Fecal filiberto >120    Responded to course of entocort but recurred with tapering and mesalamine is not enough to control her disease requiring dose escalation. She has responded well to stelara and is off of steroids.   -- Continue stelara every 8 weeks  --Labs to include CBC, LFTs, CRP, ESR every 3 months. Due now  -- colonoscopy in 6 months to confirm mucosal healing  -- may discontinue mesalamine    2. Constipation - PRN miralax    3. Prior history of recurrent c diff - negative 10/2022    IBD HISTORY  Age at diagnosis[age] (2019)  Extent of disease: right sided colitis  Disease phenotype: inflammatory  Melly-anal disease: none  Prior IBD surgeries: none  Prior IBD Medications:  Prednisone taper 2/2020  Entocort  Apriso    DRUG MONITORING  TPMT enzyme activity: 24 (WNL)    6-TGN/6-MMPN levels: NA    Biologic concentration: NA    DISEASE ASSESSMENT  Labs  Recent Labs   Lab Test 04/28/23  1640 10/29/22  1215 08/26/22  1318 02/04/22  1149   CRP  --   --   --  0.3   SED 7 8   < > 7    < > = values in this interval not displayed.     Endoscopy:   -Colonoscopy 2/14/22 - [facility]ES - 1. Mild erythema at appendiceal orifice. Otherwise totally normal colon  C diff: negative 10/30/22  -July 2020, Oct 2020, Dec 2020 - then did 6 weeks vanco and  none since    sIBDQ:   IBDQ Score Date IBDQ - Total Score  (Higher score better)   8/22/2023   9:48 AM 60   11/21/2022  10:29 PM 56   5/19/2022   1:57 PM 61   1/25/2022  12:44 PM 61       IBD Health Care Maintenance:    Vaccinations:  All patients on biologics should avoid live vaccines.    -- Influenza (every year)  -- TdaP (every 10 years)  -- Pneumococcal Pneumonia (once plus booster at 5 years)  -- Yearly assessment for latent Tb (verbal screening and exam, PPD or QuantiFERON-Tb testing)    One time confirmation of immunity or serologies:  -- Hepatitis A (serologies or immunizations)  -- Hepatitis B (serologies or immunizations)  -- Varicella  -- MMR  -- HPV (all aged 18-26)  -- Meningococcal meningitis (all patients at risk for meningitis)  -- Due to the immunosuppression in this patient, I would not advise administration of live vaccines such as varicella/VZV, intranasal influenza, MMR, or yellow fever vaccine (if travelling).      Bone mineral density screening   -- Recommend all patients supplement with calcium and vitamin D  -- Given prior steroid use recommend DEXA if not already done - will discuss next visit (Entocort)    Cancer Screening:  Colon cancer screening:  Given right sided colits, recommend patient undergo regular dysplasia surveillance   Next dysplasia screening is recommended 2027.    Cervical cancer screening: Annual due to immunosupression    Skin cancer screening: Annual visual exam of skin by dermatologist since patient is immunocompromised    Depression Screening:  -- Over the last month, have you felt down, depressed, or hopeless? no  -- Over the last month, have you felt little interest or pleasure doing things? no    Misc:  -- Avoid tobacco use  -- Avoid NSAIDs as there is potentially a 25% chance of causing an IBD flare    Return to clinic in 3 months with Lan Trent and 6 months Dr. Jaocbs     Thank you for this consultation.  30 minutes spent on the date of the encounter doing  chart review, history and exam, documentation and further activities as noted above.  It was a pleasure to participate in the care of this patient; please contact us with any further questions.      Lan Trent PA-C  Division of Gastroenterology, Hepatology and Nutrition  [facility]    HPI:   Currently, here for follow up after colonoscopy - showed worsening inflammation cecum/ascending - moderate.    Feels a little run down after injections (has had infusion and one injection). Otherwise, feeling well with 1-2 BM daily, formed without blood. No urgency or nighttime stools. No EIM.    No fevers/chills/sweats. No mouth sores, eye pain, redness, vision changes, joint pains    ROS:    No fevers or chills  No weight loss  No blurry vision, double vision or change in vision  No sore throat  No lymphadenopathy  No headache, paraesthesias, or weakness in a limb  No shortness of breath or wheezing  No chest pain or pressure  No arthralgias or myalgias  No rashes or skin changes  No odynophagia or dysphagia  No BRBPR, hematochezia, melena  No dysuria, frequency or urgency  No hot/cold intolerance or polyria  No anxiety or depression    Extra intestinal manifestations of IBD:  No uveitis/episcleritis  No aphthous ulcers   No arthritis   No erythema nodosum/pyoderma gangrenosum.     PERTINENT PAST MEDICAL HISTORY:  No past medical history on file.    PREVIOUS SURGERIES:  Past Surgical History:   Procedure Laterality Date    COLONOSCOPY N/A 2/14/2022    Procedure: COLONOSCOPY, WITH  BIOPSY;  Surgeon: Luis Alberto Jacobs MD;  [place] [facility] [place]    COLONOSCOPY N/A 4/3/2023    Procedure: COLONOSCOPY, WITH BIOPSY;  Surgeon: Luis Alberto Jacobs MD;  [place] [facility] [place]       PREVIOUS ENDOSCOPY:  Results for orders placed or performed during the hospital encounter of 04/03/23   COLONOSCOPY   Result Value Ref Range    COLONOSCOPY       [facility]  [address] [place] [address] [id number] [phone]      Endoscopy Department  _______________________________________________________________________________  Patient Name: Arleen Owusu              Procedure Date: 4/3/2023 11:15 AM  MRN: [id number]                       Account Number: [id number]  YOB: 1995             Admit Type: Outpatient  Age: 27                               [place] [facility] [place] [facility]  Gender: Female                        Note Status: Finalized  Attending MD: YANCY SCHMITZ MD  Total Sedation Time:   _______________________________________________________________________________     Procedure:             Colonoscopy  Indications:           Disease activity assessment of Crohn's disease of the                          colon. On Apriso 1.5 grams daily with Entocort taper                          in 11-12/2022  Providers:             YANCY SCHMITZ MD, Roselia Laureano, RN, Kelsi Yoder MD:          IRVING PEREZ  Requesting Provider:   Shanae Perez  Medicines:             Monitored Anesthesia Care  Complications:         No immediate complications.  _______________________________________________________________________________  Procedure:             Pre-Anesthesia Assessment:                         - See EPIC H and P note                         After obtaining informed consent, the colonoscope was                          passed under direct vision. Throughout the procedure,                          the patient's blood pressure, pulse, and oxygen                          saturations were monitored continuously. The                          Colonoscope was introduced through the anus and                          advanced to the terminal ileum, with identification of                          the appendiceal orifice and IC valve. The colonoscopy                          was performed without difficulty. The patient                          tolerated the procedure well. The quality o f the bowel                           preparation was evaluated using the BBPS ([place] Bowel                          Preparation Scale) with scores of: Right Colon = 2                          (minor amount of residual staining, small fragments of                          stool and/or opaque liquid, but mucosa seen well),                          Transverse Colon = 3 (entire mucosa seen well with no                          residual staining, small fragments of stool or opaque                          liquid) and Left Colon = 3 (entire mucosa seen well                          with no residual staining, small fragments of stool or                          opaque liquid). The total BBPS score equals 8. The                          quality of the bowel preparation was good.                                                                                   Findings:       Skin tags were found on perianal exam.       The Simple Endoscopic Score for Crohn's Disease was determined based on         the endoscopic appearance of the mucosa in the following segments:       - Ileum: Findings include no ulcers present, no ulcerated surfaces, no        affected surfaces and no narrowings. Segment score: 0.       - Right Colon: Findings include aphthous ulcers less than 0.5 cm in        size, 10-30% ulcerated surfaces, 50-75% of surfaces affected and no        narrowings. Segment score: 5.       - Transverse Colon: Findings include no ulcers present, no ulcerated        surfaces, no affected surfaces and no narrowings. Segment score: 0.       - Left Colon: Findings include no ulcers present, no ulcerated surfaces,        no affected surfaces and no narrowings. Segment score: 0.       - Rectum: Findings include no ulcers present, no ulcerated surfaces, no        affected surfaces and no narrowings. Segment score: 0.       - Total SES-CD aggregate score: 5. Biopsies were taken with a cold        forceps for histology (cecum/ascending, transverse,  descending/sigmoid,        rectum in  separate jars).       The terminal ileum appeared normal.                                                                                   Impression:            - Perianal skin tags found on perianal exam.                         - Simple Endoscopic Score for Crohn's Disease: 5,                          mucosal inflammatory changes secondary to Crohn's                          disease with colonic involvement. Biopsied.                         - The examined portion of the ileum was normal.                         Moderate inflammation in cecum, IC valve and proximal                          ascending with erythema, aphthous ulcers and loss of                          vasculature. Consistent with moderate Crohn's that is                          worse than 1 year ago. Mesalamine is not adequate.                          Will have her follow up in clinic to discuss                          escalation of therapy to biologics.  Recommendation:        - Discharge patient to home (with es lizette).                         - Resume previous diet.                         - Continue present medications.                         - Await pathology results.                         - Repeat colonoscopy in 4 years for surveillance.                         - Return to GI clinic - see above discussion                                                                                     Electronically Signed by: Dr. Luis Alberto Schmitz  ___________________________  LUIS ALBERTO SCHMITZ MD  4/3/2023 3:09:24 PM  I was physically present for the entire viewing portion of the exam.  __________________________  Signature of teaching physician  LUIS ALBERTO SCHMITZ MD  Number of Addenda: 0    Note Initiated On: 4/3/2023 11:15 AM  Scope In:  Scope Out:     ]    ALLERGIES:     Allergies   Allergen Reactions    Ibuprofen      Pt has an autoimmune disease it causes a negative colon reaction , can tolerate tylenol  "   Levonorgestrel-Ethinyl Estrad Headache, Hives and Itching    Seasonale Headache, Hives and Itching    Latex Rash       PERTINENT MEDICATIONS:    Current Outpatient Medications:     budesonide (ENTOCORT EC) 3 MG EC capsule, Take 3 capsules (9 mg) by mouth every morning X 1month, then 2 capsules x 2 weeks, then 1 cap x 2 weeks and then stop, Disp: 270 capsule, Rfl: 3    buPROPion (WELLBUTRIN XL) 150 MG 24 hr tablet, Take 1 tablet (150 mg) by mouth every morning, Disp: 90 tablet, Rfl: 1    cyanocobalamin (CYANOCOBALAMIN) 1000 MCG/ML injection, Inject 1 mL (1,000 mcg) into the muscle every 30 days, Disp: 1 mL, Rfl: 11    fluticasone (FLONASE) 50 MCG/ACT nasal spray, Spray 1-2 sprays into both nostrils 2 times daily as needed, Disp: , Rfl:     levonorgestrel (KYLEENA) 19.5 MG IUD, 1 each (19.5 mg) by Intrauterine route once, Disp: , Rfl:     mesalamine (APRISO ER) 0.375 g 24 hr capsule, Take 4 capsules (1.5 g) by mouth daily, Disp: 120 capsule, Rfl: 2    NEEDLES, ANY SIZE, Please use 27 gauge 1 inch needle to inject subcutaneous stelara as directed every 8 weeks., Disp: 10 each, Rfl: 1    sertraline (ZOLOFT) 100 MG tablet, Take 1 tablet (100 mg) by mouth 2 times daily, Disp: 180 tablet, Rfl: 3    Syringe/Needle, Disp, 20G X 1-1/2\" 3 ML MISC, Use as directed with Stelara every 8 weeks., Disp: 10 each, Rfl: 1    ustekinumab (STELARA) 45 MG/0.5ML SOLN, Inject 90mg (1mL) subcutaneously every 8 weeks. IV infusion completed 6/8/23. First does due 8/3/23., Disp: 1 mL, Rfl: 3    Current Facility-Administered Medications:     cyanocobalamin injection 1,000 mcg, 1,000 mcg, Intramuscular, Q30 Days, Luis Alberto Jacobs MD, 1,000 mcg at 06/02/23 0806    SOCIAL HISTORY:  Social History     Socioeconomic History    Marital status: Single     Spouse name: Not on file    Number of children: Not on file    Years of education: Not on file    Highest education level: Not on file   Occupational History    Not on file   Tobacco Use "    Smoking status: Never    Smokeless tobacco: Never   Vaping Use    Vaping Use: Never used   Substance and Sexual Activity    Alcohol use: Not on file    Drug use: Not on file    Sexual activity: Not on file   Other Topics Concern    Not on file   Social History Narrative    Not on file     Social Determinants of Health     Financial Resource Strain: Not on file   Food Insecurity: Not on file   Transportation Needs: Not on file   Physical Activity: Not on file   Stress: Not on file   Social Connections: Not on file   Intimate Partner Violence: Not on file   Housing Stability: Not on file       FAMILY HISTORY:  No family history on file.    Past/family/social history reviewed and no changes    PHYSICAL EXAMINATION:  Constitutional: aaox3, cooperative, pleasant, not dyspneic/diaphoretic, no acute distress  Vitals reviewed: There were no vitals taken for this visit.  Wt:   Wt Readings from Last 2 Encounters:   06/08/23 72.1 kg (159 lb)   05/23/23 70.9 kg (156 lb 6.4 oz)      Eyes: Sclera anicteric/injected  Respiratory: Unlabored breathing  Skin: no jaundice  Psych: Normal affect      PERTINENT STUDIES:  Most recent CBC:  Recent Labs   Lab Test 05/23/23  0913 04/28/23  1640   WBC 8.9 8.6   HGB 12.8 13.5   HCT 39.0 40.0    277     Most recent hepatic panel:  Recent Labs   Lab Test 04/28/23  1640 10/29/22  1213   ALT 15 25   AST 20 19     Most recent creatinine:  Recent Labs   Lab Test 05/23/23  0913 04/28/23  1640   CR 0.79 0.80           Answers submitted by the patient for this visit:  Symptoms you have experienced in the last 30 days (Submitted on 8/22/2023)  General Symptoms: Yes  Skin Symptoms: No  HENT Symptoms: No  EYE SYMPTOMS: No  HEART SYMPTOMS: No  LUNG SYMPTOMS: No  INTESTINAL SYMPTOMS: No  URINARY SYMPTOMS: No  GYNECOLOGIC SYMPTOMS: No  BREAST SYMPTOMS: No  SKELETAL SYMPTOMS: No  BLOOD SYMPTOMS: No  NERVOUS SYSTEM SYMPTOMS: No  MENTAL HEALTH SYMPTOMS: Yes  Please answer the questions below to tell  us what conditions you are experiencing: (Submitted on 8/22/2023)  Fever: No  Loss of appetite: No  Weight loss: No  Weight gain: No  Fatigue: Yes  Night sweats: Yes  Chills: No  Increased stress: Yes  Excessive hunger: No  Excessive thirst: No  Feeling hot or cold when others believe the temperature is normal: No  Loss of height: No  Post-operative complications: No  Surgical site pain: No  Hallucinations: No  Change in or Loss of Energy: No  Hyperactivity: No  Confusion: No  Please answer the questions below to tell us what condition you are experiencing: (Submitted on 8/22/2023)  Nervous or Anxious: No  Depression: No  Trouble sleeping: Yes  Trouble thinking or concentrating: No  Mood changes: No  Panic attacks: No          Again, thank you for allowing me to participate in the care of your patient.      Sincerely,    Lan Trent PA-C   none

## 2023-08-22 NOTE — NURSING NOTE
Is the patient currently in the state of [place]? YES    Visit mode:VIDEO    If the visit is dropped, the patient can be reconnected by: VIDEO VISIT: Text to cell phone:   Telephone Information:   Mobile [phone]       Will anyone else be joining the visit? NO  (If patient encounters technical issues they should call [phone]    How would you like to obtain your AVS? MyChart    Are changes needed to the allergy or medication list? No    Reason for visit: RECHECK    Jose GUPTAF

## 2023-08-22 NOTE — PATIENT INSTRUCTIONS
It was a pleasure taking care of you today.  I've included a brief summary of our discussion and care plan from today's visit below.  Please review this information with your primary care provider.  ______________________________________________________________________    My recommendations are summarized as follows:    -- Continue stelara every 8 weeks   -- Labs every 3 months   -- Next endoscopic assessment: colonoscopy in 6 months  -- Patient with IBD we recommend supplementation vitamin D 1000 units daily and calcium 500 mg twice daily.  -- No NSAIDs (ibuprofen, or anything containing ibuprofen)       For additional resources about inflammatory bowel disease visit [email or web address]     Return to GI Clinic in 3 months to review your progress.    ______________________________________________________________________    How do I schedule labs, imaging studies, or procedures that were ordered in clinic today?     Labs: To schedule lab appointment at the [facility], use my chart or call [phone]. If you have a [place] lab closer to home where you are regularly seen you can give them a call.     Procedures: If a colonoscopy, upper endoscopy, breath test, esophageal manometry, or pH impedence was ordered today, our endoscopy team will call you to schedule this. If you have not heard from our endoscopy team within a week, please call [phone] to schedule.     Imaging Studies: If you were scheduled for a CT scan, X-ray, MRI, ultrasound, HIDA scan or other imaging study, please call [phone] to have this scheduled.     Referral: If a referral to another specialty was ordered, expect a phone call or follow instructions above. If you have not heard from anyone regarding your referral in a week, please call our clinic to check the status.     Who do I call with any questions after my visit?  Please be in touch if there are any further questions that arise following today's  visit.  There are multiple ways to contact your gastroenterology care team.      During business hours, you may reach a Gastroenterology nurse at [phone]    To schedule or reschedule an appointment, please call [phone].     You can always send a secure message through [employer].  [employer] messages are answered by your nurse or doctor typically within 24 hours.  Please allow extra time on weekends and holidays.      For urgent/emergent questions after business hours, you may reach the on-call GI Fellow by contacting the [facility] at [phone].     How will I get the results of any tests ordered?    You will receive all of your results.  If you have signed up for Must See Indiat, any tests ordered at your visit will be available to you after your physician reviews them.  Typically this takes 1-2 weeks.  If there are urgent results that require a change in your care plan, your physician or nurse will call you to discuss the next steps.      What is [employer]?  [employer] is a secure way for you to access all of your healthcare records from the [facility].  It is a web based computer program, so you can sign on to it from any location.  It also allows you to send secure messages to your care team.  I recommend signing up for [employer] access if you have not already done so and are comfortable with using a computer.         Sincerely,    Lan Trent PA-C  [facility]  Division of Gastroenterology

## 2023-08-22 NOTE — PROGRESS NOTES
Virtual Visit Details    Type of service:  Video Visit     Originating Location (pt. Location): Home    Distant Location (provider location):  Off-site  Platform used for Video Visit: Nessa  Start 1023  Stop 1036    IBD CLINIC VISIT    CC/REFERRING MD:  Luis Alberto Jacobs    REASON FOR CONSULTATION: follow up Crohn's Colitis    ASSESSMENT/PLAN:    1. Crohn's colitis - moderate.   Current medication: stelara induction   Current clinical disease activity: remission   Last endoscopic disease activity: Colonoscopy 4/2023 - CDES - 5. Alphous ulcers, erythema and loss of vasculature cecum and proximal ascending  Enterography: MRE normal 3/322  Fecal filiberto >120    Responded to course of entocort but recurred with tapering and mesalamine is not enough to control her disease requiring dose escalation. She has responded well to stelara and is off of steroids.   -- Continue stelara every 8 weeks  --Labs to include CBC, LFTs, CRP, ESR every 3 months. Due now  -- colonoscopy in 6 months to confirm mucosal healing  -- may discontinue mesalamine    2. Constipation - PRN miralax    3. Prior history of recurrent c diff - negative 10/2022    IBD HISTORY  Age at diagnosis: 22/23 (2019)  Extent of disease: right sided colitis  Disease phenotype: inflammatory  Melly-anal disease: none  Prior IBD surgeries: none  Prior IBD Medications:  Prednisone taper 2/2020  Entocort  Apriso    DRUG MONITORING  TPMT enzyme activity: 24 (WNL)    6-TGN/6-MMPN levels: NA    Biologic concentration: NA    DISEASE ASSESSMENT  Labs  Recent Labs   Lab Test 04/28/23  1640 10/29/22  1215 08/26/22  1318 02/04/22  1149   CRP  --   --   --  0.3   SED 7 8   < > 7    < > = values in this interval not displayed.     Endoscopy:   -Colonoscopy 2/14/22 - CDES - 1. Mild erythema at appendiceal orifice. Otherwise totally normal colon  C diff: negative 10/30/22  -July 2020, Oct 2020, Dec 2020 - then did 6 weeks vanco and none since    sIBDQ:   IBDQ Score Date IBDQ -  Total Score  (Higher score better)   8/22/2023   9:48 AM 60   11/21/2022  10:29 PM 56   5/19/2022   1:57 PM 61   1/25/2022  12:44 PM 61       IBD Health Care Maintenance:    Vaccinations:  All patients on biologics should avoid live vaccines.    -- Influenza (every year)  -- TdaP (every 10 years)  -- Pneumococcal Pneumonia (once plus booster at 5 years)  -- Yearly assessment for latent Tb (verbal screening and exam, PPD or QuantiFERON-Tb testing)    One time confirmation of immunity or serologies:  -- Hepatitis A (serologies or immunizations)  -- Hepatitis B (serologies or immunizations)  -- Varicella  -- MMR  -- HPV (all aged 18-26)  -- Meningococcal meningitis (all patients at risk for meningitis)  -- Due to the immunosuppression in this patient, I would not advise administration of live vaccines such as varicella/VZV, intranasal influenza, MMR, or yellow fever vaccine (if travelling).      Bone mineral density screening   -- Recommend all patients supplement with calcium and vitamin D  -- Given prior steroid use recommend DEXA if not already done - will discuss next visit (Entocort)    Cancer Screening:  Colon cancer screening:  Given right sided colits, recommend patient undergo regular dysplasia surveillance   Next dysplasia screening is recommended 2027.    Cervical cancer screening: Annual due to immunosupression    Skin cancer screening: Annual visual exam of skin by dermatologist since patient is immunocompromised    Depression Screening:  -- Over the last month, have you felt down, depressed, or hopeless? no  -- Over the last month, have you felt little interest or pleasure doing things? no    Misc:  -- Avoid tobacco use  -- Avoid NSAIDs as there is potentially a 25% chance of causing an IBD flare    Return to clinic in 3 months with Lan Trent and 6 months Dr. Jacobs     Thank you for this consultation.  30 minutes spent on the date of the encounter doing chart review, history and exam, documentation  and further activities as noted above.  It was a pleasure to participate in the care of this patient; please contact us with any further questions.      Lan Trent PA-C  Division of Gastroenterology, Hepatology and Nutrition  [facility]    HPI:   Currently, here for follow up after colonoscopy - showed worsening inflammation cecum/ascending - moderate.    Feels a little run down after injections (has had infusion and one injection). Otherwise, feeling well with 1-2 BM daily, formed without blood. No urgency or nighttime stools. No EIM.    No fevers/chills/sweats. No mouth sores, eye pain, redness, vision changes, joint pains    ROS:    No fevers or chills  No weight loss  No blurry vision, double vision or change in vision  No sore throat  No lymphadenopathy  No headache, paraesthesias, or weakness in a limb  No shortness of breath or wheezing  No chest pain or pressure  No arthralgias or myalgias  No rashes or skin changes  No odynophagia or dysphagia  No BRBPR, hematochezia, melena  No dysuria, frequency or urgency  No hot/cold intolerance or polyria  No anxiety or depression    Extra intestinal manifestations of IBD:  No uveitis/episcleritis  No aphthous ulcers   No arthritis   No erythema nodosum/pyoderma gangrenosum.     PERTINENT PAST MEDICAL HISTORY:  No past medical history on file.    PREVIOUS SURGERIES:  Past Surgical History:   Procedure Laterality Date    COLONOSCOPY N/A 2/14/2022    Procedure: COLONOSCOPY, WITH  BIOPSY;  Surgeon: Luis Alberto Jacobs MD;  Location: [facility] OR    COLONOSCOPY N/A 4/3/2023    Procedure: COLONOSCOPY, WITH BIOPSY;  Surgeon: Luis Alberto Jacobs MD;  Location: [facility] OR       PREVIOUS ENDOSCOPY:  Results for orders placed or performed during the hospital encounter of 04/03/23   COLONOSCOPY   Result Value Ref Range    COLONOSCOPY       [facility]  [address] [place] [id number] [phone]     Endoscopy  Department  _______________________________________________________________________________  Patient Name: Arleen Owusu              Procedure Date: 4/3/2023 11:15 AM  MRN: [id number]                       Account Number: [id number]  YOB: 1995             Admit Type: Outpatient  Age: 27                               Room: [facility] PROCEDURE ROOM 03  Gender: Female                        Note Status: Finalized  Attending MD: YANCY SCHMITZ MD  Total Sedation Time:   _______________________________________________________________________________     Procedure:             Colonoscopy  Indications:           Disease activity assessment of Crohn's disease of the                          colon. On Apriso 1.5 grams daily with Entocort taper                          in 11-12/2022  Providers:             YANCY SCHMITZ MD, Roselia Laureano, RN, Kelsi Yoder MD:          IRVING PEREZ  Requesting Provider:   Shanae Perez  Medicines:             Monitored Anesthesia Care  Complications:         No immediate complications.  _______________________________________________________________________________  Procedure:             Pre-Anesthesia Assessment:                         - See EPIC H and P note                         After obtaining informed consent, the colonoscope was                          passed under direct vision. Throughout the procedure,                          the patient's blood pressure, pulse, and oxygen                          saturations were monitored continuously. The                          Colonoscope was introduced through the anus and                          advanced to the terminal ileum, with identification of                          the appendiceal orifice and IC valve. The colonoscopy                          was performed without difficulty. The patient                          tolerated the procedure well. The quality o f the bowel                           preparation was evaluated using the BBPS ([place] Bowel                          Preparation Scale) with scores of: Right Colon = 2                          (minor amount of residual staining, small fragments of                          stool and/or opaque liquid, but mucosa seen well),                          Transverse Colon = 3 (entire mucosa seen well with no                          residual staining, small fragments of stool or opaque                          liquid) and Left Colon = 3 (entire mucosa seen well                          with no residual staining, small fragments of stool or                          opaque liquid). The total BBPS score equals 8. The                          quality of the bowel preparation was good.                                                                                   Findings:       Skin tags were found on perianal exam.       The Simple Endoscopic Score for Crohn's Disease was determined based on         the endoscopic appearance of the mucosa in the following segments:       - Ileum: Findings include no ulcers present, no ulcerated surfaces, no        affected surfaces and no narrowings. Segment score: 0.       - Right Colon: Findings include aphthous ulcers less than 0.5 cm in        size, 10-30% ulcerated surfaces, 50-75% of surfaces affected and no        narrowings. Segment score: 5.       - Transverse Colon: Findings include no ulcers present, no ulcerated        surfaces, no affected surfaces and no narrowings. Segment score: 0.       - Left Colon: Findings include no ulcers present, no ulcerated surfaces,        no affected surfaces and no narrowings. Segment score: 0.       - Rectum: Findings include no ulcers present, no ulcerated surfaces, no        affected surfaces and no narrowings. Segment score: 0.       - Total SES-CD aggregate score: 5. Biopsies were taken with a cold        forceps for histology (cecum/ascending, transverse, descending/sigmoid,         rectum in  separate jars).       The terminal ileum appeared normal.                                                                                   Impression:            - Perianal skin tags found on perianal exam.                         - Simple Endoscopic Score for Crohn's Disease: 5,                          mucosal inflammatory changes secondary to Crohn's                          disease with colonic involvement. Biopsied.                         - The examined portion of the ileum was normal.                         Moderate inflammation in cecum, IC valve and proximal                          ascending with erythema, aphthous ulcers and loss of                          vasculature. Consistent with moderate Crohn's that is                          worse than 1 year ago. Mesalamine is not adequate.                          Will have her follow up in clinic to discuss                          escalation of therapy to biologics.  Recommendation:        - Discharge patient to home (with es lizette).                         - Resume previous diet.                         - Continue present medications.                         - Await pathology results.                         - Repeat colonoscopy in 4 years for surveillance.                         - Return to GI clinic - see above discussion                                                                                     Electronically Signed by: Dr. Luis Alberto Schmitz  ___________________________  LUIS ALBERTO SCHMITZ MD  4/3/2023 3:09:24 PM  I was physically present for the entire viewing portion of the exam.  __________________________  Signature of teaching physician  LUIS ALBERTO SCHMITZ MD  Number of Addenda: 0    Note Initiated On: 4/3/2023 11:15 AM  Scope In:  Scope Out:     ]    ALLERGIES:     Allergies   Allergen Reactions    Ibuprofen      Pt has an autoimmune disease it causes a negative colon reaction , can tolerate tylenol     "Levonorgestrel-Ethinyl Estrad Headache, Hives and Itching    Seasonale Headache, Hives and Itching    Latex Rash       PERTINENT MEDICATIONS:    Current Outpatient Medications:     budesonide (ENTOCORT EC) 3 MG EC capsule, Take 3 capsules (9 mg) by mouth every morning X 1month, then 2 capsules x 2 weeks, then 1 cap x 2 weeks and then stop, Disp: 270 capsule, Rfl: 3    buPROPion (WELLBUTRIN XL) 150 MG 24 hr tablet, Take 1 tablet (150 mg) by mouth every morning, Disp: 90 tablet, Rfl: 1    cyanocobalamin (CYANOCOBALAMIN) 1000 MCG/ML injection, Inject 1 mL (1,000 mcg) into the muscle every 30 days, Disp: 1 mL, Rfl: 11    fluticasone (FLONASE) 50 MCG/ACT nasal spray, Spray 1-2 sprays into both nostrils 2 times daily as needed, Disp: , Rfl:     levonorgestrel (KYLEENA) 19.5 MG IUD, 1 each (19.5 mg) by Intrauterine route once, Disp: , Rfl:     mesalamine (APRISO ER) 0.375 g 24 hr capsule, Take 4 capsules (1.5 g) by mouth daily, Disp: 120 capsule, Rfl: 2    NEEDLES, ANY SIZE, Please use 27 gauge 1 inch needle to inject subcutaneous stelara as directed every 8 weeks., Disp: 10 each, Rfl: 1    sertraline (ZOLOFT) 100 MG tablet, Take 1 tablet (100 mg) by mouth 2 times daily, Disp: 180 tablet, Rfl: 3    Syringe/Needle, Disp, 20G X 1-1/2\" 3 ML MISC, Use as directed with Stelara every 8 weeks., Disp: 10 each, Rfl: 1    ustekinumab (STELARA) 45 MG/0.5ML SOLN, Inject 90mg (1mL) subcutaneously every 8 weeks. IV infusion completed 6/8/23. First does due 8/3/23., Disp: 1 mL, Rfl: 3    Current Facility-Administered Medications:     cyanocobalamin injection 1,000 mcg, 1,000 mcg, Intramuscular, Q30 Days, Luis Alberto Jacobs MD, 1,000 mcg at 06/02/23 0806    SOCIAL HISTORY:  Social History     Socioeconomic History    Marital status: Single     Spouse name: Not on file    Number of children: Not on file    Years of education: Not on file    Highest education level: Not on file   Occupational History    Not on file   Tobacco Use    " Smoking status: Never    Smokeless tobacco: Never   Vaping Use    Vaping Use: Never used   Substance and Sexual Activity    Alcohol use: Not on file    Drug use: Not on file    Sexual activity: Not on file   Other Topics Concern    Not on file   Social History Narrative    Not on file     Social Determinants of Health     Financial Resource Strain: Not on file   Food Insecurity: Not on file   Transportation Needs: Not on file   Physical Activity: Not on file   Stress: Not on file   Social Connections: Not on file   Intimate Partner Violence: Not on file   Housing Stability: Not on file       FAMILY HISTORY:  No family history on file.    Past/family/social history reviewed and no changes    PHYSICAL EXAMINATION:  Constitutional: aaox3, cooperative, pleasant, not dyspneic/diaphoretic, no acute distress  Vitals reviewed: There were no vitals taken for this visit.  Wt:   Wt Readings from Last 2 Encounters:   06/08/23 72.1 kg (159 lb)   05/23/23 70.9 kg (156 lb 6.4 oz)      Eyes: Sclera anicteric/injected  Respiratory: Unlabored breathing  Skin: no jaundice  Psych: Normal affect      PERTINENT STUDIES:  Most recent CBC:  Recent Labs   Lab Test 05/23/23  0913 04/28/23  1640   WBC 8.9 8.6   HGB 12.8 13.5   HCT 39.0 40.0    277     Most recent hepatic panel:  Recent Labs   Lab Test 04/28/23  1640 10/29/22  1213   ALT 15 25   AST 20 19     Most recent creatinine:  Recent Labs   Lab Test 05/23/23  0913 04/28/23  1640   CR 0.79 0.80           Answers submitted by the patient for this visit:  Symptoms you have experienced in the last 30 days (Submitted on 8/22/2023)  General Symptoms: Yes  Skin Symptoms: No  HENT Symptoms: No  EYE SYMPTOMS: No  HEART SYMPTOMS: No  LUNG SYMPTOMS: No  INTESTINAL SYMPTOMS: No  URINARY SYMPTOMS: No  GYNECOLOGIC SYMPTOMS: No  BREAST SYMPTOMS: No  SKELETAL SYMPTOMS: No  BLOOD SYMPTOMS: No  NERVOUS SYSTEM SYMPTOMS: No  MENTAL HEALTH SYMPTOMS: Yes  Please answer the questions below to tell us  what conditions you are experiencing: (Submitted on 8/22/2023)  Fever: No  Loss of appetite: No  Weight loss: No  Weight gain: No  Fatigue: Yes  Night sweats: Yes  Chills: No  Increased stress: Yes  Excessive hunger: No  Excessive thirst: No  Feeling hot or cold when others believe the temperature is normal: No  Loss of height: No  Post-operative complications: No  Surgical site pain: No  Hallucinations: No  Change in or Loss of Energy: No  Hyperactivity: No  Confusion: No  Please answer the questions below to tell us what condition you are experiencing: (Submitted on 8/22/2023)  Nervous or Anxious: No  Depression: No  Trouble sleeping: Yes  Trouble thinking or concentrating: No  Mood changes: No  Panic attacks: No

## 2023-08-24 ENCOUNTER — TELEPHONE (OUTPATIENT)
Dept: GASTROENTEROLOGY | Facility: CLINIC | Age: 28
End: 2023-08-24
Payer: COMMERCIAL

## 2023-08-24 NOTE — TELEPHONE ENCOUNTER
KRISH and sent mychart for patient to schedule, per provider's visit notes:    3 mo follow-up with Lan Trent (around Nov 2023). RTN IBD, in person or virtual.     Patient already is scheduled for follow-up in January with Dr. Jacobs.

## 2023-08-29 ENCOUNTER — LAB (OUTPATIENT)
Dept: LAB | Facility: CLINIC | Age: 28
End: 2023-08-29
Payer: COMMERCIAL

## 2023-08-29 DIAGNOSIS — K50.10 CROHN'S DISEASE OF COLON WITHOUT COMPLICATION (H): ICD-10-CM

## 2023-08-29 LAB
ALBUMIN SERPL BCG-MCNC: 4.9 G/DL (ref 3.5–5.2)
ALP SERPL-CCNC: 100 U/L (ref 35–104)
ALT SERPL W P-5'-P-CCNC: 23 U/L (ref 0–50)
AST SERPL W P-5'-P-CCNC: 21 U/L (ref 0–45)
BASOPHILS # BLD AUTO: 0 10E3/UL (ref 0–0.2)
BASOPHILS NFR BLD AUTO: 0 %
BILIRUB DIRECT SERPL-MCNC: <0.2 MG/DL (ref 0–0.3)
BILIRUB SERPL-MCNC: 0.3 MG/DL
CRP SERPL-MCNC: 3.83 MG/L
EOSINOPHIL # BLD AUTO: 0.1 10E3/UL (ref 0–0.7)
EOSINOPHIL NFR BLD AUTO: 1 %
ERYTHROCYTE [DISTWIDTH] IN BLOOD BY AUTOMATED COUNT: 13.3 % (ref 10–15)
ERYTHROCYTE [SEDIMENTATION RATE] IN BLOOD BY WESTERGREN METHOD: 7 MM/HR (ref 0–20)
HCT VFR BLD AUTO: 40.7 % (ref 35–47)
HGB BLD-MCNC: 13.9 G/DL (ref 11.7–15.7)
IMM GRANULOCYTES # BLD: 0 10E3/UL
IMM GRANULOCYTES NFR BLD: 0 %
LYMPHOCYTES # BLD AUTO: 3.1 10E3/UL (ref 0.8–5.3)
LYMPHOCYTES NFR BLD AUTO: 40 %
MCH RBC QN AUTO: 29 PG (ref 26.5–33)
MCHC RBC AUTO-ENTMCNC: 34.2 G/DL (ref 31.5–36.5)
MCV RBC AUTO: 85 FL (ref 78–100)
MONOCYTES # BLD AUTO: 0.6 10E3/UL (ref 0–1.3)
MONOCYTES NFR BLD AUTO: 7 %
NEUTROPHILS # BLD AUTO: 3.9 10E3/UL (ref 1.6–8.3)
NEUTROPHILS NFR BLD AUTO: 52 %
NRBC # BLD AUTO: 0 10E3/UL
NRBC BLD AUTO-RTO: 0 /100
PLATELET # BLD AUTO: 259 10E3/UL (ref 150–450)
PROT SERPL-MCNC: 7.5 G/DL (ref 6.4–8.3)
RBC # BLD AUTO: 4.79 10E6/UL (ref 3.8–5.2)
WBC # BLD AUTO: 7.7 10E3/UL (ref 4–11)

## 2023-08-29 PROCEDURE — 85652 RBC SED RATE AUTOMATED: CPT | Performed by: PATHOLOGY

## 2023-08-29 PROCEDURE — 86140 C-REACTIVE PROTEIN: CPT | Performed by: PATHOLOGY

## 2023-08-29 PROCEDURE — 85025 COMPLETE CBC W/AUTO DIFF WBC: CPT | Performed by: PATHOLOGY

## 2023-08-29 PROCEDURE — 36415 COLL VENOUS BLD VENIPUNCTURE: CPT | Performed by: PATHOLOGY

## 2023-08-29 PROCEDURE — 80076 HEPATIC FUNCTION PANEL: CPT | Performed by: PATHOLOGY

## 2023-08-30 ENCOUNTER — MYC MEDICAL ADVICE (OUTPATIENT)
Dept: OBGYN | Facility: CLINIC | Age: 28
End: 2023-08-30
Payer: COMMERCIAL

## 2023-08-31 ENCOUNTER — HOSPITAL ENCOUNTER (OUTPATIENT)
Facility: AMBULATORY SURGERY CENTER | Age: 28
End: 2023-08-31
Attending: INTERNAL MEDICINE | Admitting: INTERNAL MEDICINE
Payer: COMMERCIAL

## 2023-08-31 ENCOUNTER — TELEPHONE (OUTPATIENT)
Dept: GASTROENTEROLOGY | Facility: CLINIC | Age: 28
End: 2023-08-31
Payer: COMMERCIAL

## 2023-08-31 NOTE — TELEPHONE ENCOUNTER
"Endoscopy Scheduling Screen    Have you had a positive Covid test in the last 14 days?  No    Are you active on MyChart?   Yes    What insurance is in the chart?  Other:  [employer]    Ordering/Referring Provider: Lan Trent PA-C      (If ordering provider performs procedure, schedule with ordering provider unless otherwise instructed. )    BMI: Estimated body mass index is 24.9 kg/m  as calculated from the following:    Height as of 5/23/23: 1.702 m (5' 7.01\").    Weight as of 6/8/23: 72.1 kg (159 lb).     Sedation Ordered  MAC/deep sedation.   BMI<= 45 45 < BMI <= 48 48 < BMI < = 50  BMI > 50   No Restrictions No [facility]  No [facility]  [facility] with exceptions Hospital Only OR Only       Are you taking any prescription medications for pain 3 or more times per week?   No    Do you have a history of malignant hyperthermia or adverse reaction to anesthesia?  No    (Females) Are you currently pregnant?   No     Have you been diagnosed or told you have pulmonary hypertension?   No    Do you have an LVAD?  No    Have you been told you have moderate to severe sleep apnea?  No    Have you been told you have COPD, asthma, or any other lung disease?  No    Do you have any heart conditions?  No     Have you ever had an organ transplant?   No    Have you ever had or are you awaiting a heart or lung transplant?   No    Have you had a stroke or transient ischemic attack (TIA aka \"mini stroke\" in the last 6 months?   No    Have you been diagnosed with or been told you have cirrhosis of the liver?   No    Are you currently on dialysis?   No    Do you need assistance transferring?   No    BMI: Estimated body mass index is 24.9 kg/m  as calculated from the following:    Height as of 5/23/23: 1.702 m (5' 7.01\").    Weight as of 6/8/23: 72.1 kg (159 lb).     Is patients BMI > 40 and scheduling location [facility]?  No    Do you take an injectable medication for weight loss or diabetes (excluding insulin)?  No    Do you take the medication " Naltrexone?  No    Do you take blood thinners?  No       Prep   Are you currently on dialysis or do you have chronic kidney disease?  No    Do you have a diagnosis of diabetes?  No    Do you have a diagnosis of cystic fibrosis (CF)?  No    On a regular basis do you go 3 -5 days between bowel movements?  No    BMI > 40?  No    Preferred Pharmacy:      [place], [place] - [address]  [address]  [facility] [address]  Phone: [phone] Fax: [phone] Alternate Fax: [phone], [phone]      Final Scheduling Details   Colonoscopy prep sent?  MiraLAX (No Mag Citrate)    Procedure scheduled  Colonoscopy    Surgeon:  NADIR     Date of procedure:  1/9     Pre-OP / PAC:   No - Not required for this site.    Location  [facility] - ASC - Patient preference.    Sedation   MAC/Deep Sedation - Per order.      Patient Reminders:   You will receive a call from a Nurse to review instructions and health history.  This assessment must be completed prior to your procedure.  Failure to complete the Nurse assessment may result in the procedure being cancelled.      On the day of your procedure, please designate an adult(s) who can drive you home stay with you for the next 24 hours. The medicines used in the exam will make you sleepy. You will not be able to drive.      You cannot take public transportation, ride share services, or non-medical taxi service without a responsible caregiver.  Medical transport services are allowed with the requirement that a responsible caregiver will receive you at your destination.  We require that drivers and caregivers are confirmed prior to your procedure.

## 2023-09-01 ENCOUNTER — TELEPHONE (OUTPATIENT)
Dept: GASTROENTEROLOGY | Facility: CLINIC | Age: 28
End: 2023-09-01
Payer: COMMERCIAL

## 2023-09-06 ENCOUNTER — MYC MEDICAL ADVICE (OUTPATIENT)
Dept: GASTROENTEROLOGY | Facility: CLINIC | Age: 28
End: 2023-09-06
Payer: COMMERCIAL

## 2023-09-12 NOTE — TELEPHONE ENCOUNTER
"[facility] form completed and signed by Dr. Jacobs. Faxed to \"Attention [employer]\" at [phone].     Updated patient via [employer]. Also scanned into patient's chart.   "

## 2023-09-26 ENCOUNTER — MYC MEDICAL ADVICE (OUTPATIENT)
Dept: GASTROENTEROLOGY | Facility: CLINIC | Age: 28
End: 2023-09-26
Payer: COMMERCIAL

## 2023-09-28 ENCOUNTER — MYC MEDICAL ADVICE (OUTPATIENT)
Dept: GASTROENTEROLOGY | Facility: CLINIC | Age: 28
End: 2023-09-28
Payer: COMMERCIAL

## 2023-09-28 DIAGNOSIS — M79.642 PAIN IN BOTH HANDS: ICD-10-CM

## 2023-09-28 DIAGNOSIS — K50.10 CROHN'S DISEASE OF COLON WITHOUT COMPLICATION (H): Primary | ICD-10-CM

## 2023-09-28 DIAGNOSIS — M79.641 PAIN IN BOTH HANDS: ICD-10-CM

## 2023-09-28 NOTE — TELEPHONE ENCOUNTER
[place] paperwork completed and signed by Dr. Jacobs. Faxed to number provided: [phone]. Also emailed to Araceli Kim at [email or web address].     FMLA forms scanned into the chart as well.     Updated the patient vis mychart.

## 2023-09-29 NOTE — TELEPHONE ENCOUNTER
Called pt. Joint pain in hands - metacarpals by her description. B/L but left > right. Swollen and a little red. No trauma or injury. No cuts on hands. No fevers. Did have some chills. Some pain in right knee but mild. Otherwise does not feel ill.     No GI symptoms at all. Feels Stelara really helping.     Possibly IBD related arthropathy or another primary inflammatory arthritis (RA, etc). Less likely gout/pseudogout. Do not have high suspicion for infectious arthropathy given b/l and clinical scenario.     Plan:  -check CBC, LFTs, ESR, CRP and fecal calpro  -check RF, anti-CCP and NIURKA panel  -check XR b/l hands  -rheumatology referral  -schedule tylenol (avoid NSAIDs given Crohn's)  -follow symptoms - if worsen can consider sulfasalazine or even a course of prednisone     She agrees to plan. All questions answered     Luis Alberto Jacobs MD

## 2023-09-29 NOTE — TELEPHONE ENCOUNTER
"Received callback from patient regarding joint pain.     Patient reports she started to notice the pain on Monday. Symptoms worsened and were more noticeable on Wednesday.     Reports that her hands/knuckles are stiff and visibly swollen, and also notes some redness.     States her wrist is also sore, however no redness or swelling is noted.     Patient reports this did not occur prior to her previous Stelara injection.     Patient reports back in fall/winter of 2018, prior to being diagnosed with Crohn's she reported these symptoms to her PCP. They instructed her to take ibuprofen and said it was a \"reactive arthritis.\" During that time the symptoms/location were the same as this episode. No referral to rheumatology was provided at that time.     Patient denies any GI symptoms at this time.     Is not aware of any fevers, but does report 2 episodes in the past few days of waking up during the night in a cold sweat, feeling clammy. Writer instructed patient to monitor her temperature throughout the day.     Patient did administer Stelara injection yesterday. Instructed patient to notify GI team in the future if these symptoms present, prior to doing the injection.    Will route to Dr. Jacobs and KAREN luna for recommendations.     Also discussed new MTM consult protocol, patient expressed understanding and agreed to follow with MTM.   "

## 2023-09-29 NOTE — TELEPHONE ENCOUNTER
Patient called writer to report some low grade temps this afternoon at ~99.    Writer instructed the patient to continue to monitor.     Recommended taking a Covid test at this time, and monitor symptoms overall.     If feeling worse, or temperature continues to trend up (when Tylenol is wearing off) then would recommend going into the ED.     Patient expressed understanding.

## 2023-09-29 NOTE — TELEPHONE ENCOUNTER
Attempted to contact the patient via telephone, however no answer. Left detailed message requesting callback and also sent [employer] message. Updated Dr. Jacobs as well.

## 2023-09-29 NOTE — TELEPHONE ENCOUNTER
Contacted patient to assist with scheduling next steps.    Patient is not able to schedule lab appointment today or Saturday. Assisted with scheduling lab appointment for Sunday.     Rheumatology referral ordered by Dr. Jacobs. Provided scheduling number to patient. [employer] message sent to scheduling team to request assistance in getting the patient in sooner.     Patient transferred to imaging scheduling to schedule x-ray- will complete Monday morning.

## 2023-10-02 ENCOUNTER — LAB (OUTPATIENT)
Dept: LAB | Facility: CLINIC | Age: 28
End: 2023-10-02
Payer: COMMERCIAL

## 2023-10-02 ENCOUNTER — ANCILLARY PROCEDURE (OUTPATIENT)
Dept: GENERAL RADIOLOGY | Facility: CLINIC | Age: 28
End: 2023-10-02
Attending: INTERNAL MEDICINE
Payer: COMMERCIAL

## 2023-10-02 DIAGNOSIS — K50.10 CROHN'S DISEASE OF COLON WITHOUT COMPLICATION (H): ICD-10-CM

## 2023-10-02 DIAGNOSIS — M79.642 PAIN IN BOTH HANDS: ICD-10-CM

## 2023-10-02 DIAGNOSIS — M79.641 PAIN IN BOTH HANDS: ICD-10-CM

## 2023-10-02 LAB
ALBUMIN SERPL BCG-MCNC: 4.3 G/DL (ref 3.5–5.2)
ALP SERPL-CCNC: 103 U/L (ref 35–104)
ALT SERPL W P-5'-P-CCNC: 36 U/L (ref 0–50)
ANION GAP SERPL CALCULATED.3IONS-SCNC: 11 MMOL/L (ref 7–15)
AST SERPL W P-5'-P-CCNC: 24 U/L (ref 0–45)
BASOPHILS # BLD AUTO: 0 10E3/UL (ref 0–0.2)
BASOPHILS NFR BLD AUTO: 0 %
BILIRUB DIRECT SERPL-MCNC: NORMAL MG/DL
BILIRUB SERPL-MCNC: 0.2 MG/DL
BUN SERPL-MCNC: 9.7 MG/DL (ref 6–20)
CALCIUM SERPL-MCNC: 9.2 MG/DL (ref 8.6–10)
CCP AB SER IA-ACNC: 1.1 U/ML
CHLORIDE SERPL-SCNC: 104 MMOL/L (ref 98–107)
CREAT SERPL-MCNC: 0.63 MG/DL (ref 0.51–0.95)
CRP SERPL-MCNC: 4.05 MG/L
DEPRECATED HCO3 PLAS-SCNC: 22 MMOL/L (ref 22–29)
DSDNA AB SER-ACNC: 1.5 IU/ML
EGFRCR SERPLBLD CKD-EPI 2021: >90 ML/MIN/1.73M2
ENA SM IGG SER IA-ACNC: <0.7 U/ML
ENA SM IGG SER IA-ACNC: NEGATIVE
ENA SS-A AB SER IA-ACNC: <0.5 U/ML
ENA SS-A AB SER IA-ACNC: NEGATIVE
ENA SS-B IGG SER IA-ACNC: <0.6 U/ML
ENA SS-B IGG SER IA-ACNC: NEGATIVE
EOSINOPHIL # BLD AUTO: 0.1 10E3/UL (ref 0–0.7)
EOSINOPHIL NFR BLD AUTO: 1 %
ERYTHROCYTE [DISTWIDTH] IN BLOOD BY AUTOMATED COUNT: 13.2 % (ref 10–15)
ERYTHROCYTE [SEDIMENTATION RATE] IN BLOOD BY WESTERGREN METHOD: 8 MM/HR (ref 0–20)
GLUCOSE SERPL-MCNC: 105 MG/DL (ref 70–99)
HCT VFR BLD AUTO: 37 % (ref 35–47)
HGB BLD-MCNC: 12.4 G/DL (ref 11.7–15.7)
IMM GRANULOCYTES # BLD: 0 10E3/UL
IMM GRANULOCYTES NFR BLD: 0 %
LYMPHOCYTES # BLD AUTO: 3.1 10E3/UL (ref 0.8–5.3)
LYMPHOCYTES NFR BLD AUTO: 38 %
MCH RBC QN AUTO: 28.4 PG (ref 26.5–33)
MCHC RBC AUTO-ENTMCNC: 33.5 G/DL (ref 31.5–36.5)
MCV RBC AUTO: 85 FL (ref 78–100)
MONOCYTES # BLD AUTO: 0.6 10E3/UL (ref 0–1.3)
MONOCYTES NFR BLD AUTO: 8 %
NEUTROPHILS # BLD AUTO: 4.3 10E3/UL (ref 1.6–8.3)
NEUTROPHILS NFR BLD AUTO: 53 %
NRBC # BLD AUTO: 0 10E3/UL
NRBC BLD AUTO-RTO: 0 /100
PLATELET # BLD AUTO: 236 10E3/UL (ref 150–450)
POTASSIUM SERPL-SCNC: 4.2 MMOL/L (ref 3.4–5.3)
PROT SERPL-MCNC: 6.6 G/DL (ref 6.4–8.3)
RBC # BLD AUTO: 4.37 10E6/UL (ref 3.8–5.2)
RHEUMATOID FACT SER NEPH-ACNC: <6 IU/ML
SODIUM SERPL-SCNC: 137 MMOL/L (ref 135–145)
U1 SNRNP IGG SER IA-ACNC: 1.1 U/ML
U1 SNRNP IGG SER IA-ACNC: NEGATIVE
WBC # BLD AUTO: 8.2 10E3/UL (ref 4–11)

## 2023-10-02 PROCEDURE — 86225 DNA ANTIBODY NATIVE: CPT | Performed by: INTERNAL MEDICINE

## 2023-10-02 PROCEDURE — 83516 IMMUNOASSAY NONANTIBODY: CPT | Mod: 90 | Performed by: PATHOLOGY

## 2023-10-02 PROCEDURE — 86200 CCP ANTIBODY: CPT | Performed by: INTERNAL MEDICINE

## 2023-10-02 PROCEDURE — 85025 COMPLETE CBC W/AUTO DIFF WBC: CPT | Performed by: PATHOLOGY

## 2023-10-02 PROCEDURE — 36415 COLL VENOUS BLD VENIPUNCTURE: CPT | Performed by: PATHOLOGY

## 2023-10-02 PROCEDURE — 86140 C-REACTIVE PROTEIN: CPT | Performed by: PATHOLOGY

## 2023-10-02 PROCEDURE — 73130 X-RAY EXAM OF HAND: CPT | Mod: GC | Performed by: RADIOLOGY

## 2023-10-02 PROCEDURE — 85652 RBC SED RATE AUTOMATED: CPT | Performed by: PATHOLOGY

## 2023-10-02 PROCEDURE — 80053 COMPREHEN METABOLIC PANEL: CPT | Performed by: PATHOLOGY

## 2023-10-02 PROCEDURE — 99000 SPECIMEN HANDLING OFFICE-LAB: CPT | Performed by: PATHOLOGY

## 2023-10-02 PROCEDURE — 86235 NUCLEAR ANTIGEN ANTIBODY: CPT | Performed by: INTERNAL MEDICINE

## 2023-10-02 PROCEDURE — 86431 RHEUMATOID FACTOR QUANT: CPT | Performed by: INTERNAL MEDICINE

## 2023-10-02 NOTE — TELEPHONE ENCOUNTER
Attempted to contact the patient to obtain symptom update, however no answer. Left detailed message requesting callback.

## 2023-10-03 ENCOUNTER — MYC MEDICAL ADVICE (OUTPATIENT)
Dept: GASTROENTEROLOGY | Facility: CLINIC | Age: 28
End: 2023-10-03
Payer: COMMERCIAL

## 2023-10-03 ENCOUNTER — TELEPHONE (OUTPATIENT)
Dept: GASTROENTEROLOGY | Facility: CLINIC | Age: 28
End: 2023-10-03
Payer: COMMERCIAL

## 2023-10-03 NOTE — TELEPHONE ENCOUNTER
MTM referral from: [facility] visit (referral by provider)    MTM referral outreach attempt #2 on October 3, 2023 at 9:47 AM      Outcome: Patient not reachable after several attempts, will route to MT Pharmacist/Provider as an FYI.  [facility] scheduling number is [phone].  Thank you for the referral.    Use hbc for the carrier/Plan on the flowsheet      [employer] Message Sent    Sully Renteria CPhT  [facility]

## 2023-10-03 NOTE — TELEPHONE ENCOUNTER
Writer has not yet received callback for symptom update. [employer] message sent.

## 2023-10-05 LAB — HISTONE IGG SER IA-ACNC: 0.6 UNITS

## 2023-10-08 PROCEDURE — 83993 ASSAY FOR CALPROTECTIN FECAL: CPT | Performed by: INTERNAL MEDICINE

## 2023-10-08 PROCEDURE — 99000 SPECIMEN HANDLING OFFICE-LAB: CPT | Performed by: PATHOLOGY

## 2023-10-09 ENCOUNTER — APPOINTMENT (OUTPATIENT)
Dept: LAB | Facility: CLINIC | Age: 28
End: 2023-10-09
Payer: COMMERCIAL

## 2023-10-11 LAB — CALPROTECTIN STL-MCNT: <5 MG/KG (ref 0–49.9)

## 2023-10-16 ENCOUNTER — VIRTUAL VISIT (OUTPATIENT)
Dept: GASTROENTEROLOGY | Facility: CLINIC | Age: 28
End: 2023-10-16
Attending: INTERNAL MEDICINE
Payer: COMMERCIAL

## 2023-10-16 DIAGNOSIS — K50.10 CROHN'S DISEASE OF COLON WITHOUT COMPLICATION (H): ICD-10-CM

## 2023-10-16 NOTE — PROGRESS NOTES
Medication Therapy Management (MTM) Encounter    ASSESSMENT:                            Medication Adherence/Access: No issues identified    Crohn's Disease:  Arleen would benefit from continuing Stelara 90 mg every 8 weeks. She is up to date on standard maintenance labs. She is indicated for a few vaccinations which were recommended to her. She is not getting adequate calcium from her diet and supplementation was recommended. She has not had routine skin checks completed and a referral to dermatology was placed with this encounter.   I provided education about how to schedule acetaminophen in the setting of a flare in joint pain. She would benefit from meeting with Health Psychology and a referral was placed with this encounter.       PLAN:                            Please connect with Dr. Jacobs's nurse coordinator (Gladis Lazcano, SONAL) to schedule another B12 injection.   When you are having a flare, you can schedule acetaminophen. Take acetaminophen 1000 mg every 6 hours while awake (max 3 times/day).  Arleen will consider the following vaccines:  Annual flu shot  Updated (4847-0015 Formula) COVID-19   Shingles vaccine (Shingrix, 2-dose series)   Hepatitis B 3-dose series   I've placed a referral to dermatology so you can complete your routine annual skin checks.   I've also placed a referral for Health Psychology.   Consider starting a calcium supplement. I recommend calcium 600 mg/vitamin D 400 units 1 tablet by mouth once daily with food. If this is tolerated after 1-2 weeks you can increase to 1 tablet by mouth twice daily with food.    Follow-up: MTM 3 months visit     SUBJECTIVE/OBJECTIVE:                          Arleen Owusu is a 27 year old female called for an initial visit. She was referred to me from Luis Alberto Jacobs MD.      Reason for visit: Stelara + IBD Health maintenance.    Allergies/ADRs: Reviewed in chart  Past Medical History: Reviewed in chart  Tobacco: She reports that she has never smoked. She  has never used smokeless tobacco.  Alcohol: none      Medication Adherence/Access: no issues reported    Crohn's Disease:   Ustekinumab 90 mg every 8 weeks    She reports no adherence concerns to stelara every 8 weeks. Sets a calendar reminder. After he injection she has fatigue for about 2 days that week. Sometimes fatigue requires her to take time off of work. She reports her symptom control has been good from a Crohn's perspective. No injection site reactions or other side effects.     Regarding the joint pain in hands that she contacted the clinic about, she reports it got better within a few days of taking Stelara. She took some acetaminophen about 1000 mg twice a day. I provided education about giving acetaminophen 1000 mg every 6 hours max 3000 mg/day when she is having a flare like this.     She has not had a B12 injection since July. She has discontinued mesalamine and completed her course of budesonide, so these were removed from her medication list.      Last provider visit: 23 with Lan Trent PA-C  Next provider visit: 24 Luis Alberto Jcaobs MD  Last labs completed: 10/2/23  Lab frequency: every 3 months   - standing labs hepatic panel, CRP, ESR, CB with platelelets & diff  24  Next labs due: 2024    Last IBD Health Maintenance Review: 5/3/23  PDC: 73%, PDC falsely low     IBD Health Care Maintenance:    Vaccinations:  All patients on biologics should avoid live vaccines.    -- Influenza (every year) 2020, 10/18/22  -- TdaP (every 10 years)2015  -- Pneumococcal Pneumonia    Prevnar-13: not on file   Pneumovax-23: not on file   Prevnar-20: 23  -- COVID-19- 3/2021, 2021, 2021, 10/2022 (bivalent)  -- Yearly assessment for latent Tb (verbal screening and exam, PPD or QuantiFERON-Tb testing)      result: negative 23    One time confirmation of immunity or serologies:  -- Hepatitis A (serologies or immunizations)- 2008, 2007  -- Hepatitis B (serologies or  "immunizations) not immune by serology  -- Varicella/Zoster    Varicella- not on file   Zoster- not on file  -- MMR- 8/2000, 2/1997  -- HPV (all aged 18-26) 2/2008, 10/2007, 8/2007  -- Meningococcal meningitis (all patients at risk for meningitis)-- MenACWY: Menveo (7/2/2013, 8/1/2007), Menactra (8/1/2007)    Due to the immunosuppression in this patient, I would not advise administration of live vaccines such as varicella/VZV, intranasal influenza, MMR, or yellow fever vaccine (if traveling).      Pre-Biologic Screening:  -- Hep B Surface Antibody negative 2/4/22  -- Hep B Surface Antigen non-reactive 2/4/22  -- Hep B Core Antibody negative 2/4/22  -- Hep C Antibody non-reactive 5/23/23    Bone mineral density screening   -- Recommend all patients supplement with calcium and vitamin D- has a calcium 500 mg gummy, not enough dairy to meet calcium    -- prior Entocort use    Cancer Screening:  Colon cancer screening:  Per Lan Trent PA-C 8/22/23: \"Given right sided colits, recommend patient undergo regular dysplasia surveillance   Next dysplasia screening is recommended 2027.\"    Cervical cancer screening: Annual due to immunosupression- follows with OB/GYN    Skin cancer screening: Annual visual exam of skin by dermatologist since patient is immunocompromised- does not have this done    Depression Screening: Follows with PCP   -- Over the last month, have you felt down, depressed, or hopeless?   -- Over the last month, have you felt little interest or pleasure doing things?     Misc:  -- Avoid tobacco use  -- Avoid NSAIDs as there is potentially a 25% chance of causing an IBD flare        A medication reconciliation was completed with this visit but a comprehensive medication review was not done due to other constraints.    ----------------      I spent 30 minutes with this patient today. All changes were made via collaborative practice agreement with Luis Alberto Jacobs MD. A copy of the visit note was provided to the " patient's provider(s).    A summary of these recommendations was sent via [employer].    Alena MelchorD, BCPS  MTM Pharmacist   [facility]  Phone: [phone]    Telemedicine Visit Details  Type of service:  Telephone visit  Start Time:  8:47 AM  End Time: 9:17 AM     Medication Therapy Recommendations  No medication therapy recommendations to display

## 2023-10-16 NOTE — Clinical Note
10/16/2023         RE: Arleen Owusu  [address] [place] [address]        Dear Colleague,    Thank you for referring your patient, Arleen Owusu, to the [facility]. Please see a copy of my visit note below.    Medication Therapy Management (MTM) Encounter    ASSESSMENT:                            Medication Adherence/Access: No issues identified    Crohn's Disease:  ***      PLAN:                              Please connect with Dr. Jacobs's nurse coordinator to schedule another B12 injection.   When you are having a flare, you can schedule acetaminophen. Take acetaminophen 1000 mg every 6 hours while awake (max 3 times/day).  Arleen will consider the following vaccines:  Annual flu shot  Updated (7305-8510 Formula) COVID-19   Shingles vaccine (Shingrix, 2-dose series)   Hepatitis B 3-dose series   I've placed a referral to dermatology so you can complete your routine annual skin checks.   I've also placed a referral for Health Psychology.   Consider starting a calcium supplement. I recommend calcium 600 mg/vitamin D 400 units 1 tablet by mouth once daily with food. If this is tolerated after 1-2 weeks you can increase to 1 tablet by mouth twice daily with food.    Follow-up: MTM 3 months visit     SUBJECTIVE/OBJECTIVE:                          Arleen Owusu is a 27 year old female called for an initial visit. She was referred to me from Luis Alberto Jacobs MD.      Reason for visit: Stelara + IBD Health maintenance.    Allergies/ADRs: Reviewed in chart  Past Medical History: Reviewed in chart  Tobacco: She reports that she has never smoked. She has never used smokeless tobacco.  Alcohol: none      Medication Adherence/Access: no issues reported    Crohn's Disease:   Ustekinumab 90 mg every 8 weeks    She reports no adherence concerns to stelara every 8 weeks. Sets a calendar reminder. After he injection she has fatigue for about 2 days that week. Sometimes fatigue requires her to  take time off of work. She reports her symptom control has been good from a Crohn's perspective. No injection site reactions or other side effects.     Regarding the joint pain in hands that she contacted the clinic about, she reports it got better within a few days of taking Stelara. She took some acetaminophen about 1000 mg twice a day. I provided education about giving acetaminophen 1000 mg every 6 hours max 3000 mg/day when she is having a flare like this.     She has not had a B12 injection since July. She has discontinued mesalamine and completed her course of budesonide, so these were removed from her medication list.      Last provider visit: 23 with Lan Trent PA-C  Next provider visit: 24 Luis Alberto Jacobs MD  Last labs completed: 10/2/23  Lab frequency: every 3 months   - standing labs hepatic panel, CRP, ESR, CB with platelelets & diff  24  Next labs due: 2024    Last IBD Health Maintenance Review: 5/3/23  PDC: 73%, PDC falsely low     IBD Health Care Maintenance:    Vaccinations:  All patients on biologics should avoid live vaccines.    -- Influenza (every year) 2020, 10/18/22  -- TdaP (every 10 years)2015  -- Pneumococcal Pneumonia    Prevnar-13: not on file   Pneumovax-23: not on file   Prevnar-20: 23  -- COVID-19- 3/2021, 2021, 2021, 10/2022 (bivalent)  -- Yearly assessment for latent Tb (verbal screening and exam, PPD or QuantiFERON-Tb testing)      result: negative 23    One time confirmation of immunity or serologies:  -- Hepatitis A (serologies or immunizations)- 2008, 2007  -- Hepatitis B (serologies or immunizations) not immune by serology  -- Varicella/Zoster    Varicella- not on file   Zoster- not on file  -- MMR- 2000, 1997  -- HPV (all aged 18-26) 2008, 10/2007, 2007  -- Meningococcal meningitis (all patients at risk for meningitis)-- MenACWY: Menveo (2013, 2007), Menactra (2007)    Due to the immunosuppression in this  "patient, I would not advise administration of live vaccines such as varicella/VZV, intranasal influenza, MMR, or yellow fever vaccine (if traveling).      Pre-Biologic Screening:  -- Hep B Surface Antibody negative 2/4/22  -- Hep B Surface Antigen non-reactive 2/4/22  -- Hep B Core Antibody negative 2/4/22  -- Hep C Antibody non-reactive 5/23/23    Bone mineral density screening   -- Recommend all patients supplement with calcium and vitamin D- has a calcium 500 mg gummy, not enough dairy to meet calcium    -- prior Entocort use    Cancer Screening:  Colon cancer screening:  Per Lan Trent PA-C 8/22/23: \"Given right sided colits, recommend patient undergo regular dysplasia surveillance   Next dysplasia screening is recommended 2027.\"    Cervical cancer screening: Annual due to immunosupression- follows with OB/GYN    Skin cancer screening: Annual visual exam of skin by dermatologist since patient is immunocompromised- does not have this done    Depression Screening: Follows with PCP   -- Over the last month, have you felt down, depressed, or hopeless?   -- Over the last month, have you felt little interest or pleasure doing things?     Misc:  -- Avoid tobacco use  -- Avoid NSAIDs as there is potentially a 25% chance of causing an IBD flare        A medication reconciliation was completed with this visit but a comprehensive medication review was not done due to other constraints.    ----------------      I spent 30 minutes with this patient today. All changes were made via collaborative practice agreement with Luis Alberto Jacobs MD. A copy of the visit note was provided to the patient's provider(s).    A summary of these recommendations was sent via [employer].    Gerald Sebastian, PharmD, BCPS  MTM Pharmacist   [facility]  Phone: [phone]    Telemedicine Visit Details  Type of service:  Telephone visit  Start Time:  8:47 AM  End Time: 9:17 AM     Medication Therapy Recommendations  No " medication therapy recommendations to display       Medication Therapy Management (MTM) Encounter    ASSESSMENT:                            Medication Adherence/Access: No issues identified    Crohn's Disease:  Arleen would benefit from continuing Stelara 90 mg every 8 weeks. She is up to date on standard maintenance labs. She is indicated for a few vaccinations which were recommended to her. She is not getting adequate calcium from her diet and supplementation was recommended. She has not had routine skin checks completed and a referral to dermatology was placed with this encounter.   I provided education about how to schedule acetaminophen in the setting of a flare in joint pain. She would benefit from meeting with Health Psychology and a referral was placed with this encounter.       PLAN:                            Please connect with Dr. Jacobs's nurse coordinator (Gladis Lazcano, SONAL) to schedule another B12 injection.   When you are having a flare, you can schedule acetaminophen. Take acetaminophen 1000 mg every 6 hours while awake (max 3 times/day).  Arleen will consider the following vaccines:  Annual flu shot  Updated ([id number] Formula) COVID-19   Shingles vaccine (Shingrix, 2-dose series)   Hepatitis B 3-dose series   I've placed a referral to dermatology so you can complete your routine annual skin checks.   I've also placed a referral for Health Psychology.   Consider starting a calcium supplement. I recommend calcium 600 mg/vitamin D 400 units 1 tablet by mouth once daily with food. If this is tolerated after 1-2 weeks you can increase to 1 tablet by mouth twice daily with food.    Follow-up: MTM 3 months visit     SUBJECTIVE/OBJECTIVE:                          Arleen Owusu is a 27 year old female called for an initial visit. She was referred to me from Luis Alberto Jacobs MD.      Reason for visit: Stelara + IBD Health maintenance.    Allergies/ADRs: Reviewed in chart  Past Medical History: Reviewed in  chart  Tobacco: She reports that she has never smoked. She has never used smokeless tobacco.  Alcohol: none      Medication Adherence/Access: no issues reported    Crohn's Disease:   Ustekinumab 90 mg every 8 weeks    She reports no adherence concerns to stelara every 8 weeks. Sets a calendar reminder. After he injection she has fatigue for about 2 days that week. Sometimes fatigue requires her to take time off of work. She reports her symptom control has been good from a Crohn's perspective. No injection site reactions or other side effects.     Regarding the joint pain in hands that she contacted the clinic about, she reports it got better within a few days of taking Stelara. She took some acetaminophen about 1000 mg twice a day. I provided education about giving acetaminophen 1000 mg every 6 hours max 3000 mg/day when she is having a flare like this.     She has not had a B12 injection since July. She has discontinued mesalamine and completed her course of budesonide, so these were removed from her medication list.      Last provider visit: 23 with Lan Trent PA-C  Next provider visit: 24 Luis Alberto Jacbos MD  Last labs completed: 10/2/23  Lab frequency: every 3 months   - standing labs hepatic panel, CRP, ESR, CB with platelelets & diff  24  Next labs due: 2024    Last IBD Health Maintenance Review: 5/3/23  PDC: 73%, PDC falsely low     IBD Health Care Maintenance:    Vaccinations:  All patients on biologics should avoid live vaccines.    -- Influenza (every year) 2020, 10/18/22  -- TdaP (every 10 years)2015  -- Pneumococcal Pneumonia    Prevnar-13: not on file   Pneumovax-23: not on file   Prevnar-20: 23  -- COVID-19- 3/2021, 2021, 2021, 10/2022 (bivalent)  -- Yearly assessment for latent Tb (verbal screening and exam, PPD or QuantiFERON-Tb testing)      result: negative 23    One time confirmation of immunity or serologies:  -- Hepatitis A (serologies or  "immunizations)- 2/2008, 2/2007  -- Hepatitis B (serologies or immunizations) not immune by serology  -- Varicella/Zoster    Varicella- not on file   Zoster- not on file  -- MMR- 8/2000, 2/1997  -- HPV (all aged 18-26) 2/2008, 10/2007, 8/2007  -- Meningococcal meningitis (all patients at risk for meningitis)-- MenACWY: Menveo (7/2/2013, 8/1/2007), Menactra (8/1/2007)    Due to the immunosuppression in this patient, I would not advise administration of live vaccines such as varicella/VZV, intranasal influenza, MMR, or yellow fever vaccine (if traveling).      Pre-Biologic Screening:  -- Hep B Surface Antibody negative 2/4/22  -- Hep B Surface Antigen non-reactive 2/4/22  -- Hep B Core Antibody negative 2/4/22  -- Hep C Antibody non-reactive 5/23/23    Bone mineral density screening   -- Recommend all patients supplement with calcium and vitamin D- has a calcium 500 mg gummy, not enough dairy to meet calcium    -- prior Entocort use    Cancer Screening:  Colon cancer screening:  Per Lan Trent PA-C 8/22/23: \"Given right sided colits, recommend patient undergo regular dysplasia surveillance   Next dysplasia screening is recommended 2027.\"    Cervical cancer screening: Annual due to immunosupression- follows with OB/GYN    Skin cancer screening: Annual visual exam of skin by dermatologist since patient is immunocompromised- does not have this done    Depression Screening: Follows with PCP   -- Over the last month, have you felt down, depressed, or hopeless?   -- Over the last month, have you felt little interest or pleasure doing things?     Misc:  -- Avoid tobacco use  -- Avoid NSAIDs as there is potentially a 25% chance of causing an IBD flare        A medication reconciliation was completed with this visit but a comprehensive medication review was not done due to other constraints.    ----------------      I spent 30 minutes with this patient today. All changes were made via collaborative practice agreement with " Luis Alberto Jacobs MD. A copy of the visit note was provided to the patient's provider(s).    A summary of these recommendations was sent via [employer].    Gerald Sebastian PharmD, BCPS  [facility] Pharmacist   [facility]  Phone: [phone]    Telemedicine Visit Details  Type of service:  Telephone visit  Start Time:  8:47 AM  End Time: 9:17 AM     Medication Therapy Recommendations  No medication therapy recommendations to display         Again, thank you for allowing me to participate in the care of your patient.        Sincerely,        Gerald Sebastian RPH

## 2023-10-18 NOTE — PATIENT INSTRUCTIONS
"Recommendations from today's MTM visit:                                                      Please connect with Dr. Jacobs's nurse coordinator (Gladsi Lazcano, SONAL) to schedule another B12 injection.   When you are having a flare, you can schedule acetaminophen. Take acetaminophen 1000 mg every 6 hours while awake (max 3 times/day).  Arleen will consider the following vaccines:  Annual flu shot  Updated ([id number] Formula) COVID-19   Shingles vaccine (Shingrix, 2-dose series)   Hepatitis B 3-dose series   I've placed a referral to dermatology so you can complete your routine annual skin checks.   I've also placed a referral for Health Psychology.   Consider starting a calcium supplement. I recommend calcium 600 mg/vitamin D 400 units 1 tablet by mouth once daily with food. If this is tolerated after 1-2 weeks you can increase to 1 tablet by mouth twice daily with food.    Follow-up: MT 3 months visit     It was great speaking with you today.  I value your experience and would be very thankful for your time in providing feedback in our clinic survey. In the next few days, you may receive an email or text message from [employer] with a link to a survey related to your  clinical pharmacist.\"     To schedule another MTM appointment, please call the clinic directly or you may call the MTM scheduling line at [phone] or toll-free at [phone].     My Clinical Pharmacist's contact information:                                                      Please feel free to contact me with any questions or concerns you have.      Gerald Sebastian, PharmD, BCPS  MTM Pharmacist   [facility]  Phone: [phone]   "

## 2023-10-24 ENCOUNTER — MYC MEDICAL ADVICE (OUTPATIENT)
Dept: GASTROENTEROLOGY | Facility: CLINIC | Age: 28
End: 2023-10-24
Payer: COMMERCIAL

## 2023-10-24 NOTE — TELEPHONE ENCOUNTER
Attempted to contact the patient to discuss symptoms in more detail, however no answer. Left voicemail requesting callback. [employer] message also sent.     Routed to THONY JONES and Dr. Jacobs for update.

## 2023-10-25 NOTE — TELEPHONE ENCOUNTER
Received callback from patient.    Reports ongoing low grade fevers, treatable with Tylenol. Taking Tylenol 1,000mg every 8 hours.     Overall reports feeling very fatigued.    Denies shortness of breath.     Reports a cough first few days, which has now resolved.    Denies any GI symptoms.     Next Stelara is due November 23rd.     Requested patient update GI team with any worsening symptoms, fevers, etc. Patient expressed understanding.     Routed to Dr. Jacobs and MTM.

## 2023-11-07 ENCOUNTER — ALLIED HEALTH/NURSE VISIT (OUTPATIENT)
Dept: GASTROENTEROLOGY | Facility: CLINIC | Age: 28
End: 2023-11-07
Payer: COMMERCIAL

## 2023-11-07 DIAGNOSIS — E53.8 VITAMIN B12 DEFICIENCY (NON ANEMIC): Primary | ICD-10-CM

## 2023-11-07 PROCEDURE — 96372 THER/PROPH/DIAG INJ SC/IM: CPT | Performed by: INTERNAL MEDICINE

## 2023-11-07 PROCEDURE — 99207 PR NO BILLABLE SERVICE THIS VISIT: CPT

## 2023-11-07 RX ADMIN — CYANOCOBALAMIN 1000 MCG: 1000 INJECTION, SOLUTION INTRAMUSCULAR; SUBCUTANEOUS at 08:52

## 2023-11-07 NOTE — PROGRESS NOTES
Arleen Owusu comes into clinic today at the request of Luis Alberto Jacobs Ordering Provider for Med Injection only Cyanocobalamin B12. (1000MCG/ML)     Injection was given in the left deltoid.     This service provided today was under the supervising provider of the day Elise Saldaña, who was available if needed.     Debbie Go

## 2023-11-08 ENCOUNTER — TELEPHONE (OUTPATIENT)
Dept: GASTROENTEROLOGY | Facility: CLINIC | Age: 28
End: 2023-11-08
Payer: COMMERCIAL

## 2023-11-08 NOTE — TELEPHONE ENCOUNTER
Caller: Arleen  Reason for Reschedule/Cancellation (please be detailed, any staff messages or encounters to note?): Reynaldo attending      Prior to reschedule please review:  Ordering Provider: Lan Trent PA-C   Sedation per order: MAC  Does patient have any ASC Exclusions, please identify?: no      Notes on Cancelled Procedure:  Procedure: Lower Endoscopy [Colonoscopy]   Date: 1/9/24  Location: [facility]; [address] [place] [address], [address] [place] [address]  Surgeon: Reynaldo      Rescheduled: No  Procedure: Lower Endoscopy [Colonoscopy]   Date: TBD  Location: [facility]; [address] [place] [address], [place] [address]  Surgeon: Reynaldo  Sedation Level Scheduled  MAC,  Reason for Sedation Level order  Prep/Instructions updated and sent: y       Send In - basket message to Panc - Paul Pool if EUS  procedure is canceled or rescheduled: [ N/A, YES or NO] n/a         LVM and sent message    Case in depot

## 2023-11-10 ENCOUNTER — HOSPITAL ENCOUNTER (OUTPATIENT)
Facility: AMBULATORY SURGERY CENTER | Age: 28
End: 2023-11-10
Attending: INTERNAL MEDICINE | Admitting: INTERNAL MEDICINE
Payer: COMMERCIAL

## 2023-11-13 ENCOUNTER — TELEPHONE (OUTPATIENT)
Dept: GASTROENTEROLOGY | Facility: CLINIC | Age: 28
End: 2023-11-13
Payer: COMMERCIAL

## 2023-11-13 NOTE — TELEPHONE ENCOUNTER
"Endoscopy Scheduling Screen    Have you had a positive Covid test in the last 14 days?  No    Are you active on MyChart?   Yes    What insurance is in the chart?  Other:  [employer]    Ordering/Referring Provider: CHIDI JONES   (If ordering provider performs procedure, schedule with ordering provider unless otherwise instructed. )    BMI: Estimated body mass index is 24.9 kg/m  as calculated from the following:    Height as of 5/23/23: 1.702 m (5' 7.01\").    Weight as of 6/8/23: 72.1 kg (159 lb).     Sedation Ordered  MAC/deep sedation.   BMI<= 45 45 < BMI <= 48 48 < BMI < = 50  BMI > 50   No Restrictions No MG ASC  No [facility]  [facility] with exceptions Hospital Only OR Only       Are you taking any prescription medications for pain 3 or more times per week?   No    Do you have a history of malignant hyperthermia or adverse reaction to anesthesia?  No    (Females) Are you currently pregnant?   No     Have you been diagnosed or told you have pulmonary hypertension?   No    Do you have an LVAD?  No    Have you been told you have moderate to severe sleep apnea?  No    Have you been told you have COPD, asthma, or any other lung disease?  No    Do you have any heart conditions?  No     Have you ever had an organ transplant?   No    Have you ever had or are you awaiting a heart or lung transplant?   No    Have you had a stroke or transient ischemic attack (TIA aka \"mini stroke\" in the last 6 months?   No    Have you been diagnosed with or been told you have cirrhosis of the liver?   No    Are you currently on dialysis?   No    Do you need assistance transferring?   No    BMI: Estimated body mass index is 24.9 kg/m  as calculated from the following:    Height as of 5/23/23: 1.702 m (5' 7.01\").    Weight as of 6/8/23: 72.1 kg (159 lb).     Is patients BMI > 40 and scheduling location [facility]?  No    Do you take an injectable medication for weight loss or diabetes (excluding insulin)?  No    Do you take the medication " Naltrexone?  No    Do you take blood thinners?  No       Prep   Are you currently on dialysis or do you have chronic kidney disease?  No    Do you have a diagnosis of diabetes?  No    Do you have a diagnosis of cystic fibrosis (CF)?  No    On a regular basis do you go 3 -5 days between bowel movements?  No    BMI > 40?  No    Preferred Pharmacy:    [place], [place] - [address] [facility] [address] [facility] [address]  [address]  [facility] [address]  Phone: [phone] Fax: [phone] Alternate Fax: [phone], [phone]    Final Scheduling Details   Colonoscopy prep sent?  Standard MiraLAX    Procedure scheduled  Colonoscopy    Surgeon:  NADIR     Date of procedure:  2/19/24     Pre-OP / PAC:   No - Not required for this site.    Location  [facility] - ASC - Per order.    Sedation   MAC/Deep Sedation - Per order.      Patient Reminders:   You will receive a call from a Nurse to review instructions and health history.  This assessment must be completed prior to your procedure.  Failure to complete the Nurse assessment may result in the procedure being cancelled.      On the day of your procedure, please designate an adult(s) who can drive you home stay with you for the next 24 hours. The medicines used in the exam will make you sleepy. You will not be able to drive.      You cannot take public transportation, ride share services, or non-medical taxi service without a responsible caregiver.  Medical transport services are allowed with the requirement that a responsible caregiver will receive you at your destination.  We require that drivers and caregivers are confirmed prior to your procedure.

## 2023-11-15 ENCOUNTER — OFFICE VISIT (OUTPATIENT)
Dept: OBGYN | Facility: CLINIC | Age: 28
End: 2023-11-15
Attending: MIDWIFE
Payer: COMMERCIAL

## 2023-11-15 VITALS
HEART RATE: 80 BPM | WEIGHT: 173.5 LBS | HEIGHT: 67 IN | DIASTOLIC BLOOD PRESSURE: 77 MMHG | BODY MASS INDEX: 27.23 KG/M2 | SYSTOLIC BLOOD PRESSURE: 117 MMHG

## 2023-11-15 DIAGNOSIS — R10.2 PELVIC PAIN IN FEMALE: ICD-10-CM

## 2023-11-15 DIAGNOSIS — Z30.431 IUD CHECK UP: Primary | ICD-10-CM

## 2023-11-15 PROCEDURE — 99213 OFFICE O/P EST LOW 20 MIN: CPT | Performed by: MIDWIFE

## 2023-11-15 PROCEDURE — 99214 OFFICE O/P EST MOD 30 MIN: CPT | Performed by: MIDWIFE

## 2023-11-15 RX ORDER — EPINEPHRINE 0.3 MG/.3ML
INJECTION SUBCUTANEOUS
COMMUNITY
Start: 2023-07-21

## 2023-11-15 NOTE — NURSING NOTE
Chief Complaint   Patient presents with    Follow Up     Increased pain since Kyleena IUD insertion (01/18/2023)

## 2023-11-15 NOTE — PROGRESS NOTES
"  Assessment & Plan     Arleen was seen today for follow up.    Diagnoses and all orders for this visit:    IUD check up  Pelvic pain in female  Irregular menses and increased abdominal cramping since Kyleena placement in January. IUD strings visualized. Plan for ultrasound. If malpositioned, then will remove IUD. If in appropriate position, discussed options including short course of combined contraceptive pills, vs switching to Mirena IUD, vs choosing switching to another option like nexplanon. She has some limitations of birth control options due to her IBD, however, no contraindication to mirena or nexplanon. Estrogen containing pills increase risk for blood clots, but is not contraindicated in mild severity of IBD.   -     US Pelvic Transabdominal and Transvaginal; Future      Follow up following ultrasound for further discussion of contraceptive and menstrual suppression options.       \"I, Narda Cuellar MD , am serving as a scribe to document services personally performed by CNM based on the provider's statements to me.\" - Narda Cuellar MD     The encounter was performed by me and scribed by the resident. The scribed note accurately reflects my personal services and decisions made by me.    32 minutes spent on the date of the encounter doing chart review, history and exam, documentation and further activities per the note.    GLORIA Yousif, ANDIEM        Subjective   Arleen is a 27 year old  who presents for the following health issues     Patient presents with:  Follow Up: Increased pain since Kyleena IUD insertion (01/18/2023)    Kyleena Placed 1/18/23    Having abdominal pain leading up to period and during period.   LLQ sharp pain that radiates down left leg  Also has cramping on lower back  Started after IUD placed    Cycle is inconsistent, was irregular prior to IUD as well  Some prolonged bleeding up to 2 weeks  Increased blood clots, about golf ball sized. Has some intense pain before clot " "passes    Longest between bleeding is 6 weeks. Shortest is 2 weeks  Bleeding is 3-13 days    Using IUD for period suppression and contraception.    Last contacted sperm 5 years ago. No concerns for pregnancy.     Feels limited on options with Crohn's disease.    PMS: gets cystic acne with cycle  Feels like it needs to be removed if the pain and bleeding continue.     Normal discharge now, but did have a period of time in August when she had increased watery discharge.    ROS:  - diarrhea and constipation  - no mood changes      Review of Systems   Constitutional, HEENT, cardiovascular, pulmonary, GI, and  systems are negative, except as otherwise noted.      Objective    /77   Pulse 80   Ht 1.702 m (5' 7.01\")   Wt 78.7 kg (173 lb 8 oz)   LMP 10/14/2023 (Exact Date)   BMI 27.17 kg/m    Body mass index is 27.17 kg/m .    Physical Exam   Physical Exam  Exam conducted with a chaperone present.   Constitutional:       Appearance: Normal appearance.   Genitourinary:     General: Normal vulva.      Pubic Area: No rash.       Labia:         Right: No lesion.         Left: No lesion.       Vagina: Normal.      Cervix: Normal.      Comments: Blue IUD strings visualized, approximately 2 cm.     Scant adherent white discharge in vagina.   Neurological:      Mental Status: She is alert.          ----- Service Performed and Documented by Resident  ------      "

## 2023-11-15 NOTE — PATIENT INSTRUCTIONS
Thank you for trusting us with your care!     If you need to contact us for questions about:  Symptoms, Scheduling & Medical Questions; Non-urgent (2-3 day response) Carla message, Urgent (needing response today) [phone] (if after 3:30pm next day response)   Prescriptions: Please call your Pharmacy   Billing: [facility] [phone] or [facility]:[phone]

## 2023-11-15 NOTE — LETTER
"11/15/2023       RE: Arleen Owusu  [address] [place] [address]     Dear Colleague,    Thank you for referring your patient, Arleen Owusu, to the [facility] [place] [facility]. Please see a copy of my visit note below.      Assessment & Plan     Arleen was seen today for follow up.    Diagnoses and all orders for this visit:    IUD check up  Pelvic pain in female  Irregular menses and increased abdominal cramping since Kyleena placement in January. IUD strings visualized. Plan for ultrasound. If malpositioned, then will remove IUD. If in appropriate position, discussed options including short course of combined contraceptive pills, vs switching to Mirena IUD, vs choosing switching to another option like nexplanon. She has some limitations of birth control options due to her IBD, however, no contraindication to mirena or nexplanon. Estrogen containing pills increase risk for blood clots, but is not contraindicated in mild severity of IBD.   -     US Pelvic Transabdominal and Transvaginal; Future      Follow up following ultrasound for further discussion of contraceptive and menstrual suppression options.       \"I, Narda Cuellar MD , am serving as a scribe to document services personally performed by CNM based on the provider's statements to me.\" - Narda Cuellar MD     The encounter was performed by me and scribed by the resident. The scribed note accurately reflects my personal services and decisions made by me.    32 minutes spent on the date of the encounter doing chart review, history and exam, documentation and further activities per the note.    GLORIA Yousif, YVONNE        Subjective   Arleen is a 27 year old  who presents for the following health issues     Patient presents with:  Follow Up: Increased pain since Kyleena IUD insertion (01/18/2023)    Kyleena Placed 1/18/23    Having abdominal pain leading up to period " "and during period.   LLQ sharp pain that radiates down left leg  Also has cramping on lower back  Started after IUD placed    Cycle is inconsistent, was irregular prior to IUD as well  Some prolonged bleeding up to 2 weeks  Increased blood clots, about golf ball sized. Has some intense pain before clot passes    Longest between bleeding is 6 weeks. Shortest is 2 weeks  Bleeding is 3-13 days    Using IUD for period suppression and contraception.    Last contacted sperm 5 years ago. No concerns for pregnancy.     Feels limited on options with Crohn's disease.    PMS: gets cystic acne with cycle  Feels like it needs to be removed if the pain and bleeding continue.     Normal discharge now, but did have a period of time in August when she had increased watery discharge.    ROS:  - diarrhea and constipation  - no mood changes      Review of Systems   Constitutional, HEENT, cardiovascular, pulmonary, GI, and  systems are negative, except as otherwise noted.      Objective    /77   Pulse 80   Ht 1.702 m (5' 7.01\")   Wt 78.7 kg (173 lb 8 oz)   LMP 10/14/2023 (Exact Date)   BMI 27.17 kg/m    Body mass index is 27.17 kg/m .    Physical Exam   Physical Exam  Exam conducted with a chaperone present.   Constitutional:       Appearance: Normal appearance.   Genitourinary:     General: Normal vulva.      Pubic Area: No rash.       Labia:         Right: No lesion.         Left: No lesion.       Vagina: Normal.      Cervix: Normal.      Comments: Blue IUD strings visualized, approximately 2 cm.     Scant adherent white discharge in vagina.   Neurological:      Mental Status: She is alert.          ----- Service Performed and Documented by Resident  ------        GLORIA Yousif CNM    "

## 2023-11-16 ENCOUNTER — ANCILLARY PROCEDURE (OUTPATIENT)
Dept: ULTRASOUND IMAGING | Facility: CLINIC | Age: 28
End: 2023-11-16
Attending: MIDWIFE
Payer: COMMERCIAL

## 2023-11-16 DIAGNOSIS — R10.2 PELVIC PAIN IN FEMALE: ICD-10-CM

## 2023-11-16 DIAGNOSIS — Z30.431 IUD CHECK UP: ICD-10-CM

## 2023-11-16 PROCEDURE — 76830 TRANSVAGINAL US NON-OB: CPT | Mod: 26 | Performed by: OBSTETRICS & GYNECOLOGY

## 2023-11-16 PROCEDURE — 76830 TRANSVAGINAL US NON-OB: CPT

## 2024-01-02 ENCOUNTER — LAB (OUTPATIENT)
Dept: LAB | Facility: CLINIC | Age: 29
End: 2024-01-02
Payer: COMMERCIAL

## 2024-01-02 ENCOUNTER — MYC MEDICAL ADVICE (OUTPATIENT)
Dept: GASTROENTEROLOGY | Facility: CLINIC | Age: 29
End: 2024-01-02

## 2024-01-02 ENCOUNTER — OFFICE VISIT (OUTPATIENT)
Dept: GASTROENTEROLOGY | Facility: CLINIC | Age: 29
End: 2024-01-02
Attending: INTERNAL MEDICINE
Payer: COMMERCIAL

## 2024-01-02 VITALS
HEIGHT: 67 IN | BODY MASS INDEX: 27.42 KG/M2 | DIASTOLIC BLOOD PRESSURE: 67 MMHG | WEIGHT: 174.7 LBS | HEART RATE: 74 BPM | SYSTOLIC BLOOD PRESSURE: 96 MMHG

## 2024-01-02 DIAGNOSIS — M79.641 PAIN IN BOTH HANDS: Primary | ICD-10-CM

## 2024-01-02 DIAGNOSIS — K50.10 CROHN'S DISEASE OF COLON WITHOUT COMPLICATION (H): ICD-10-CM

## 2024-01-02 DIAGNOSIS — M79.642 PAIN IN BOTH HANDS: Primary | ICD-10-CM

## 2024-01-02 DIAGNOSIS — E53.8 VITAMIN B12 DEFICIENCY (NON ANEMIC): ICD-10-CM

## 2024-01-02 LAB
ALBUMIN SERPL BCG-MCNC: 4.4 G/DL (ref 3.5–5.2)
ALP SERPL-CCNC: 99 U/L (ref 40–150)
ALT SERPL W P-5'-P-CCNC: 19 U/L (ref 0–50)
ANION GAP SERPL CALCULATED.3IONS-SCNC: 9 MMOL/L (ref 7–15)
AST SERPL W P-5'-P-CCNC: 19 U/L (ref 0–45)
BASOPHILS # BLD AUTO: 0 10E3/UL (ref 0–0.2)
BASOPHILS NFR BLD AUTO: 0 %
BILIRUB DIRECT SERPL-MCNC: <0.2 MG/DL (ref 0–0.3)
BILIRUB SERPL-MCNC: 0.3 MG/DL
BUN SERPL-MCNC: 8.5 MG/DL (ref 6–20)
CALCIUM SERPL-MCNC: 9.4 MG/DL (ref 8.6–10)
CHLORIDE SERPL-SCNC: 106 MMOL/L (ref 98–107)
CREAT SERPL-MCNC: 0.66 MG/DL (ref 0.51–0.95)
CRP SERPL-MCNC: 6.76 MG/L
DEPRECATED HCO3 PLAS-SCNC: 23 MMOL/L (ref 22–29)
EGFRCR SERPLBLD CKD-EPI 2021: >90 ML/MIN/1.73M2
EOSINOPHIL # BLD AUTO: 0.1 10E3/UL (ref 0–0.7)
EOSINOPHIL NFR BLD AUTO: 1 %
ERYTHROCYTE [DISTWIDTH] IN BLOOD BY AUTOMATED COUNT: 13.6 % (ref 10–15)
ERYTHROCYTE [SEDIMENTATION RATE] IN BLOOD BY WESTERGREN METHOD: 10 MM/HR (ref 0–20)
FOLATE SERPL-MCNC: 11.6 NG/ML (ref 4.6–34.8)
GLUCOSE SERPL-MCNC: 149 MG/DL (ref 70–99)
HCT VFR BLD AUTO: 37.6 % (ref 35–47)
HGB BLD-MCNC: 13.1 G/DL (ref 11.7–15.7)
IMM GRANULOCYTES # BLD: 0 10E3/UL
IMM GRANULOCYTES NFR BLD: 0 %
LYMPHOCYTES # BLD AUTO: 2.3 10E3/UL (ref 0.8–5.3)
LYMPHOCYTES NFR BLD AUTO: 34 %
MCH RBC QN AUTO: 29.1 PG (ref 26.5–33)
MCHC RBC AUTO-ENTMCNC: 34.8 G/DL (ref 31.5–36.5)
MCV RBC AUTO: 84 FL (ref 78–100)
MONOCYTES # BLD AUTO: 0.4 10E3/UL (ref 0–1.3)
MONOCYTES NFR BLD AUTO: 6 %
NEUTROPHILS # BLD AUTO: 3.9 10E3/UL (ref 1.6–8.3)
NEUTROPHILS NFR BLD AUTO: 59 %
NRBC # BLD AUTO: 0 10E3/UL
NRBC BLD AUTO-RTO: 0 /100
PLATELET # BLD AUTO: 279 10E3/UL (ref 150–450)
POTASSIUM SERPL-SCNC: 4.1 MMOL/L (ref 3.4–5.3)
PROT SERPL-MCNC: 7 G/DL (ref 6.4–8.3)
RBC # BLD AUTO: 4.5 10E6/UL (ref 3.8–5.2)
SODIUM SERPL-SCNC: 138 MMOL/L (ref 135–145)
VIT B12 SERPL-MCNC: 259 PG/ML (ref 232–1245)
WBC # BLD AUTO: 6.6 10E3/UL (ref 4–11)

## 2024-01-02 PROCEDURE — 99000 SPECIMEN HANDLING OFFICE-LAB: CPT | Performed by: PATHOLOGY

## 2024-01-02 PROCEDURE — 82248 BILIRUBIN DIRECT: CPT | Performed by: PATHOLOGY

## 2024-01-02 PROCEDURE — 82607 VITAMIN B-12: CPT | Performed by: INTERNAL MEDICINE

## 2024-01-02 PROCEDURE — 80053 COMPREHEN METABOLIC PANEL: CPT | Performed by: PATHOLOGY

## 2024-01-02 PROCEDURE — 85652 RBC SED RATE AUTOMATED: CPT | Performed by: PATHOLOGY

## 2024-01-02 PROCEDURE — 86140 C-REACTIVE PROTEIN: CPT | Performed by: PATHOLOGY

## 2024-01-02 PROCEDURE — 36415 COLL VENOUS BLD VENIPUNCTURE: CPT | Performed by: PATHOLOGY

## 2024-01-02 PROCEDURE — 85025 COMPLETE CBC W/AUTO DIFF WBC: CPT | Performed by: PATHOLOGY

## 2024-01-02 PROCEDURE — 99215 OFFICE O/P EST HI 40 MIN: CPT | Performed by: INTERNAL MEDICINE

## 2024-01-02 PROCEDURE — 82746 ASSAY OF FOLIC ACID SERUM: CPT | Performed by: INTERNAL MEDICINE

## 2024-01-02 ASSESSMENT — ENCOUNTER SYMPTOMS
HOT FLASHES: 0
DECREASED LIBIDO: 0

## 2024-01-02 ASSESSMENT — PAIN SCALES - GENERAL: PAINLEVEL: NO PAIN (0)

## 2024-01-02 NOTE — PATIENT INSTRUCTIONS
It is good to see you today. I am so glad you are doing so well. Keep up the great work!    Continue the Stelara every 8 weeks    Keep appointment for colonoscopy    We will move back your appointment with Lan Trent to March/April    Keep appointment with rheumatology    Follow up with Dr. Jacobs in October/November

## 2024-01-02 NOTE — LETTER
1/2/2024         RE: Arleen Owusu  [address] [place] [address]        Dear Colleague,    Thank you for referring your patient, Arleen Owusu, to the [facility] [place]. Please see a copy of my visit note below.    IBD CLINIC VISIT    CC/REFERRING MD:  Luis Alberto aJcobs    REASON FOR CONSULTATION: follow up CD    ASSESSMENT/PLAN:    Crohn's colitis - now on Stelara and is doing well in symptomatic and lab remission. Fecal calpro also normal. Will proceed with colonoscopy next month as scheduled    Joint pains/swelling - L > R MCPs and PIPs - X ray and labs unremarkable. Symptoms totally resolved. Not clear if it was related to IBD arthropathy or a transient virus or something else. Keep scheduled appt with rheumatology    Constipation miralax PRN    Prior history of recurrent c diff - no recent issues    B12 deficiency - will work to arrange B12 shots monthly      IBD HISTORY  Age at diagnosis: 22/23 (2019)  Extent of disease: right sided colitis  Disease phenotype: inflammatory  Melly-anal disease: none  Prior IBD surgeries: none  Prior IBD Medications:  Prednisone taper 2/2020  Entocort  Apriso    DRUG MONITORING  TPMT enzyme activity: 24 (WNL)    6-TGN/6-MMPN levels: NA    Biologic concentration: NA    DISEASE ASSESSMENT  Labs  Recent Labs   Lab Test 10/02/23  0748 08/29/23  1621 08/26/22  1318 02/04/22  1149   CRP  --   --   --  0.3   SED 8 7   < > 7   HGB 12.4 13.9   < > 12.5    < > = values in this interval not displayed.     Endoscopy:   -Colonoscopy 2/14/22 - [facility] - 1. Mild erythema at appendiceal orifice. Otherwise totally normal colon  -Last endoscopic disease activity: Colonoscopy 4/2023 - CD[facility] - 5. Alphous ulcers, erythema and loss of vasculature cecum and proximal ascending  Enterography: MRE normal 3/3/22  Fecal filiberto >120 prior to Stelara start, now < 5 10/2023  C diff: negative 10/30/22  -July 2020, Oct 2020, Dec 2020 - then did 6 weeks vanco and  none since    sIBDQ:   IBDQ Score Date IBDQ - Total Score  (Higher score better)   8/22/2023   9:48 AM 60   11/21/2022  10:29 PM 56   5/19/2022   1:57 PM 61   1/25/2022  12:44 PM 61       IBD Health Care Maintenance:    Per MTM referral:      When you are having a flare, you can schedule acetaminophen. Take acetaminophen 1000 mg every 6 hours while awake (max 3 times/day).  Arleen will consider the following vaccines:  Annual flu shot  Updated (0574-0251 Formula) COVID-19   Shingles vaccine (Shingrix, 2-dose series)   Hepatitis B 3-dose series   I've placed a referral to dermatology so you can complete your routine annual skin checks.   I've also placed a referral for Health Psychology.   Consider starting a calcium supplement. I recommend calcium 600 mg/vitamin D 400 units 1 tablet by mouth once daily with food. If this is tolerated after 1-2 weeks you can increase to 1 tablet by mouth twice daily with food.    Depression Screening:  -- Over the last month, have you felt down, depressed, or hopeless? no  -- Over the last month, have you felt little interest or pleasure doing things? no    Misc:  -- Avoid tobacco use  -- Avoid NSAIDs as there is potentially a 25% chance of causing an IBD flare    Return to clinic in March/April with IBD OMAR and in Oct/Nov with Dr. Jacobs    Thank you for this consultation.  It was a pleasure to participate in the care of this patient; please contact us with any further questions.  I spent a total of 40 minutes during the day of encounter performed chart review, meeting with patient, patient counseling, care coordination, and documentation.      This note was created with voice recognition software, and while reviewed for accuracy, typos may remain.     Luis Alberto Jacobs MD  Division of Gastroenterology, Hepatology and Nutrition  [facility]  Pager: [phone]      HPI:   Currently, here for follow up.    She notes she is doing great! No GI symptoms on Stelara. Normal BMs. No  abdominal pain. No blood    In October she had some joint pain/swelling in her MCPs and PIPs (L >R). We got X rays which were normal. Rheum labs all normal.    Since then her symptoms have completely resolved. No further issues.    She is tolerating Stelara well. A little fatigued for a day or two after but that resolves.    ROS:    No fevers or chills  No weight loss  No blurry vision, double vision or change in vision  No sore throat  No lymphadenopathy  No headache, paraesthesias, or weakness in a limb  No shortness of breath or wheezing  No chest pain or pressure  No arthralgias or myalgias  No rashes or skin changes  No odynophagia or dysphagia  No BRBPR, hematochezia, melena  No dysuria, frequency or urgency  No hot/cold intolerance or polyria  No anxiety or depression    Extra intestinal manifestations of IBD:  No uveitis/episcleritis  No aphthous ulcers   No arthritis   No erythema nodosum/pyoderma gangrenosum.     PERTINENT PAST MEDICAL HISTORY:  No past medical history on file.    PREVIOUS SURGERIES:  Past Surgical History:   Procedure Laterality Date    COLONOSCOPY N/A 2/14/2022    Procedure: COLONOSCOPY, WITH  BIOPSY;  Surgeon: Luis Alberto Jacobs MD;  Location: [facility] OR    COLONOSCOPY N/A 4/3/2023    Procedure: COLONOSCOPY, WITH BIOPSY;  Surgeon: Luis Alberto Jacobs MD;  Location: [facility] OR       PREVIOUS ENDOSCOPY:  Results for orders placed or performed during the hospital encounter of 04/03/23   COLONOSCOPY   Result Value Ref Range    COLONOSCOPY       [facility]  [address] [place] [address], [place] [address] [phone]     Endoscopy Department  _______________________________________________________________________________  Patient Name: Arleen Owusu              Procedure Date: 4/3/2023 11:15 AM  MRN: [id number]                       Account Number: [id number]  YOB: 1995             Admit Type: Outpatient  Age: 27                               Room: [facility] PROCEDURE ROOM  03  Gender: Female                        Note Status: Finalized  Attending MD: YANCY SCHMITZ MD  Total Sedation Time:   _______________________________________________________________________________     Procedure:             Colonoscopy  Indications:           Disease activity assessment of Crohn's disease of the                          colon. On Apriso 1.5 grams daily with Entocort taper                          in 11-12/2022  Providers:             YANCY SCHMITZ MD, Roselia Laureano, RN, Kelsi Ramires  Referring MD:          IRVING PEREZ  Requesting Provider:   Shanae Perez  Medicines:             Monitored Anesthesia Care  Complications:         No immediate complications.  _______________________________________________________________________________  Procedure:             Pre-Anesthesia Assessment:                         - See EPIC H and P note                         After obtaining informed consent, the colonoscope was                          passed under direct vision. Throughout the procedure,                          the patient's blood pressure, pulse, and oxygen                          saturations were monitored continuously. The                          Colonoscope was introduced through the anus and                          advanced to the terminal ileum, with identification of                          the appendiceal orifice and IC valve. The colonoscopy                          was performed without difficulty. The patient                          tolerated the procedure well. The quality o f the bowel                          preparation was evaluated using the BBPS ([place] Bowel                          Preparation Scale) with scores of: Right Colon = 2                          (minor amount of residual staining, small fragments of                          stool and/or opaque liquid, but mucosa seen well),                          Transverse Colon = 3 (entire mucosa seen well  with no                          residual staining, small fragments of stool or opaque                          liquid) and Left Colon = 3 (entire mucosa seen well                          with no residual staining, small fragments of stool or                          opaque liquid). The total BBPS score equals 8. The                          quality of the bowel preparation was good.                                                                                   Findings:       Skin tags were found on perianal exam.       The Simple Endoscopic Score for Crohn's Disease was determined based on         the endoscopic appearance of the mucosa in the following segments:       - Ileum: Findings include no ulcers present, no ulcerated surfaces, no        affected surfaces and no narrowings. Segment score: 0.       - Right Colon: Findings include aphthous ulcers less than 0.5 cm in        size, 10-30% ulcerated surfaces, 50-75% of surfaces affected and no        narrowings. Segment score: 5.       - Transverse Colon: Findings include no ulcers present, no ulcerated        surfaces, no affected surfaces and no narrowings. Segment score: 0.       - Left Colon: Findings include no ulcers present, no ulcerated surfaces,        no affected surfaces and no narrowings. Segment score: 0.       - Rectum: Findings include no ulcers present, no ulcerated surfaces, no        affected surfaces and no narrowings. Segment score: 0.       - Total SES-CD aggregate score: 5. Biopsies were taken with a cold        forceps for histology (cecum/ascending, transverse, descending/sigmoid,        rectum in  separate jars).       The terminal ileum appeared normal.                                                                                   Impression:            - Perianal skin tags found on perianal exam.                         - Simple Endoscopic Score for Crohn's Disease: 5,                          mucosal inflammatory changes  secondary to Crohn's                          disease with colonic involvement. Biopsied.                         - The examined portion of the ileum was normal.                         Moderate inflammation in cecum, IC valve and proximal                          ascending with erythema, aphthous ulcers and loss of                          vasculature. Consistent with moderate Crohn's that is                          worse than 1 year ago. Mesalamine is not adequate.                          Will have her follow up in clinic to discuss                          escalation of therapy to biologics.  Recommendation:        - Discharge patient to home (with es lizette).                         - Resume previous diet.                         - Continue present medications.                         - Await pathology results.                         - Repeat colonoscopy in 4 years for surveillance.                         - Return to GI clinic - see above discussion                                                                                     Electronically Signed by: Dr. Luis Alberto Schmitz  ___________________________  LUIS ALBERTO SCHMITZ MD  4/3/2023 3:09:24 PM  I was physically present for the entire viewing portion of the exam.  __________________________  Signature of teaching physician  LUIS ALBERTO SCHMITZ MD  Number of Addenda: 0    Note Initiated On: 4/3/2023 11:15 AM  Scope In:  Scope Out:     ]    ALLERGIES:     Allergies   Allergen Reactions    Ibuprofen      Pt has an autoimmune disease it causes a negative colon reaction , can tolerate tylenol    Levonorgestrel-Ethinyl Estrad Headache, Hives and Itching    Seasonale Headache, Hives and Itching    Latex Rash       PERTINENT MEDICATIONS:    Current Outpatient Medications:     buPROPion (WELLBUTRIN XL) 150 MG 24 hr tablet, Take 1 tablet (150 mg) by mouth every morning, Disp: 90 tablet, Rfl: 1    cyanocobalamin (CYANOCOBALAMIN) 1000 MCG/ML injection, Inject 1 mL  "(1,000 mcg) into the muscle every 30 days, Disp: 1 mL, Rfl: 11    EPINEPHrine (ANY BX GENERIC EQUIV) 0.3 MG/0.3ML injection 2-pack, , Disp: , Rfl:     fluticasone (FLONASE) 50 MCG/ACT nasal spray, Spray 1-2 sprays into both nostrils 2 times daily as needed, Disp: , Rfl:     levonorgestrel (KYLEENA) 19.5 MG IUD, 1 each (19.5 mg) by Intrauterine route once, Disp: , Rfl:     NEEDLES, ANY SIZE, Please use 27 gauge 1 inch needle to inject subcutaneous stelara as directed every 8 weeks., Disp: 10 each, Rfl: 1    sertraline (ZOLOFT) 100 MG tablet, Take 1 tablet (100 mg) by mouth 2 times daily, Disp: 180 tablet, Rfl: 3    Syringe/Needle, Disp, 20G X 1-1/2\" 3 ML MISC, Use as directed with Stelara every 8 weeks., Disp: 10 each, Rfl: 1    ustekinumab (STELARA) 45 MG/0.5ML SOLN, Inject 90mg (1mL) subcutaneously every 8 weeks, Disp: 1 mL, Rfl: 3    Current Facility-Administered Medications:     cyanocobalamin injection 1,000 mcg, 1,000 mcg, Intramuscular, Q30 Days, Luis Alberto Jacobs MD, 1,000 mcg at 11/07/23 0852    SOCIAL HISTORY:  Social History     Socioeconomic History    Marital status: Single     Spouse name: Not on file    Number of children: Not on file    Years of education: Not on file    Highest education level: Not on file   Occupational History    Not on file   Tobacco Use    Smoking status: Never    Smokeless tobacco: Never   Vaping Use    Vaping Use: Never used   Substance and Sexual Activity    Alcohol use: Not on file    Drug use: Not on file    Sexual activity: Not on file   Other Topics Concern    Not on file   Social History Narrative    Not on file     Social Determinants of Health     Financial Resource Strain: Not on file   Food Insecurity: Not on file   Transportation Needs: Not on file   Physical Activity: Not on file   Stress: Not on file   Social Connections: Not on file   Interpersonal Safety: Not on file   Housing Stability: Not on file       FAMILY HISTORY:  No family history on " "file.    Past/family/social history reviewed and no changes    PHYSICAL EXAMINATION:  Constitutional: aaox3, cooperative, pleasant, not dyspneic/diaphoretic, no acute distress  Vitals reviewed: BP 96/67 (BP Location: Left arm, Patient Position: Sitting, Cuff Size: Adult Large)   Pulse 74   Ht 1.702 m (5' 7.01\")   Wt 79.2 kg (174 lb 11.2 oz)   LMP 10/14/2023 (Exact Date)   BMI 27.35 kg/m    Wt:   Wt Readings from Last 2 Encounters:   01/02/24 79.2 kg (174 lb 11.2 oz)   11/15/23 78.7 kg (173 lb 8 oz)      Eyes: Sclera anicteric/injected  Ears/nose/mouth/throat: Normal oropharynx without ulcers or exudate, mucus membranes moist, hearing intact  Neck: supple, thyroid normal size  CV: No edema  Respiratory: Unlabored breathing  Lymph: No axillary, submandibular, supraclavicular or inguinal lymphadenopathy  Abd:  Nondistended, +bs, no hepatosplenomegaly, nontender, no peritoneal signs  Skin: warm, perfused, no jaundice  Psych: Normal affect  MSK: Normal gait      PERTINENT STUDIES:  Most recent CBC:  Recent Labs   Lab Test 10/02/23  0748 08/29/23  1621   WBC 8.2 7.7   HGB 12.4 13.9   HCT 37.0 40.7    259     Most recent hepatic panel:  Recent Labs   Lab Test 10/02/23  0748 08/29/23  1621   ALT 36 23   AST 24 21     Most recent creatinine:  Recent Labs   Lab Test 10/02/23  0748 05/23/23  0913   CR 0.63 0.79             Answers submitted by the patient for this visit:  Symptoms you have experienced in the last 30 days (Submitted on 1/2/2024)  General Symptoms: No  Skin Symptoms: No  HENT Symptoms: No  EYE SYMPTOMS: No  HEART SYMPTOMS: No  LUNG SYMPTOMS: No  INTESTINAL SYMPTOMS: No  URINARY SYMPTOMS: No  GYNECOLOGIC SYMPTOMS: Yes  BREAST SYMPTOMS: No  SKELETAL SYMPTOMS: No  BLOOD SYMPTOMS: No  NERVOUS SYSTEM SYMPTOMS: No  MENTAL HEALTH SYMPTOMS: No  Please answer the questions below to tell us what condition you are experiencing: (Submitted on 1/2/2024)  Bleeding or spotting between periods: No  Heavy or painful " periods: Yes  Irregular periods: Yes  Vaginal discharge: No  Hot flashes: No  Vaginal dryness: No  Genital ulcers: No  Reduced libido: No  Painful intercourse: No  Difficulty with sexual arousal: No  Post-menopausal bleeding: No      Again, thank you for allowing me to participate in the care of your patient.      Sincerely,    Luis Alberto Jacobs MD

## 2024-01-02 NOTE — NURSING NOTE
"Chief Complaint   Patient presents with    RECHECK       Vitals:    01/02/24 1449   BP: 96/67   BP Location: Left arm   Patient Position: Sitting   Cuff Size: Adult Large   Pulse: 74   Weight: 79.2 kg (174 lb 11.2 oz)   Height: 1.702 m (5' 7.01\")       Body mass index is 27.35 kg/m .    Refill pended for Stelara.    Amy Marinelli LPN                        "

## 2024-01-02 NOTE — PROGRESS NOTES
IBD CLINIC VISIT    CC/REFERRING MD:  Luis Alberto Jacobs    REASON FOR CONSULTATION: follow up CD    ASSESSMENT/PLAN:    Crohn's colitis - now on Stelara and is doing well in symptomatic and lab remission. Fecal calpro also normal. Will proceed with colonoscopy next month as scheduled    Joint pains/swelling - L > R MCPs and PIPs - X ray and labs unremarkable. Symptoms totally resolved. Not clear if it was related to IBD arthropathy or a transient virus or something else. Keep scheduled appt with rheumatology    Constipation miralax PRN    Prior history of recurrent c diff - no recent issues    B12 deficiency - will work to arrange B12 shots monthly      IBD HISTORY  Age at diagnosis: 22/23 (2019)  Extent of disease: right sided colitis  Disease phenotype: inflammatory  Melly-anal disease: none  Prior IBD surgeries: none  Prior IBD Medications:  Prednisone taper 2/2020  Entocort  Apriso    DRUG MONITORING  TPMT enzyme activity: 24 (WNL)    6-TGN/6-MMPN levels: NA    Biologic concentration: NA    DISEASE ASSESSMENT  Labs  Recent Labs   Lab Test 10/02/23  0748 08/29/23  1621 08/26/22  1318 02/04/22  1149   CRP  --   --   --  0.3   SED 8 7   < > 7   HGB 12.4 13.9   < > 12.5    < > = values in this interval not displayed.     Endoscopy:   -Colonoscopy 2/14/22 - CDES - 1. Mild erythema at appendiceal orifice. Otherwise totally normal colon  -Last endoscopic disease activity: Colonoscopy 4/2023 - CDES - 5. Alphous ulcers, erythema and loss of vasculature cecum and proximal ascending  Enterography: MRE normal 3/3/22  Fecal filiberto >120 prior to Stelara start, now < 5 10/2023  C diff: negative 10/30/22  -July 2020, Oct 2020, Dec 2020 - then did 6 weeks vanco and none since    sIBDQ:   IBDQ Score Date IBDQ - Total Score  (Higher score better)   8/22/2023   9:48 AM 60   11/21/2022  10:29 PM 56   5/19/2022   1:57 PM 61   1/25/2022  12:44 PM 61       IBD Health Care Maintenance:    Per MTM referral:      When you are having a  flare, you can schedule acetaminophen. Take acetaminophen 1000 mg every 6 hours while awake (max 3 times/day).  Arleen will consider the following vaccines:  Annual flu shot  Updated (5004-4319 Formula) COVID-19   Shingles vaccine (Shingrix, 2-dose series)   Hepatitis B 3-dose series   I've placed a referral to dermatology so you can complete your routine annual skin checks.   I've also placed a referral for Health Psychology.   Consider starting a calcium supplement. I recommend calcium 600 mg/vitamin D 400 units 1 tablet by mouth once daily with food. If this is tolerated after 1-2 weeks you can increase to 1 tablet by mouth twice daily with food.    Depression Screening:  -- Over the last month, have you felt down, depressed, or hopeless? no  -- Over the last month, have you felt little interest or pleasure doing things? no    Misc:  -- Avoid tobacco use  -- Avoid NSAIDs as there is potentially a 25% chance of causing an IBD flare    Return to clinic in March/April with IBD OMAR and in Oct/Nov with Dr. Jacobs    Thank you for this consultation.  It was a pleasure to participate in the care of this patient; please contact us with any further questions.  I spent a total of 40 minutes during the day of encounter performed chart review, meeting with patient, patient counseling, care coordination, and documentation.      This note was created with voice recognition software, and while reviewed for accuracy, typos may remain.     Luis Alberto Jacobs MD  Division of Gastroenterology, Hepatology and Nutrition  [facility]  Pager: [phone]      HPI:   Currently, here for follow up.    She notes she is doing great! No GI symptoms on Stelara. Normal BMs. No abdominal pain. No blood    In October she had some joint pain/swelling in her MCPs and PIPs (L >R). We got X rays which were normal. Rheum labs all normal.    Since then her symptoms have completely resolved. No further issues.    She is tolerating Stelara well. A  little fatigued for a day or two after but that resolves.    ROS:    No fevers or chills  No weight loss  No blurry vision, double vision or change in vision  No sore throat  No lymphadenopathy  No headache, paraesthesias, or weakness in a limb  No shortness of breath or wheezing  No chest pain or pressure  No arthralgias or myalgias  No rashes or skin changes  No odynophagia or dysphagia  No BRBPR, hematochezia, melena  No dysuria, frequency or urgency  No hot/cold intolerance or polyria  No anxiety or depression    Extra intestinal manifestations of IBD:  No uveitis/episcleritis  No aphthous ulcers   No arthritis   No erythema nodosum/pyoderma gangrenosum.     PERTINENT PAST MEDICAL HISTORY:  No past medical history on file.    PREVIOUS SURGERIES:  Past Surgical History:   Procedure Laterality Date    COLONOSCOPY N/A 2/14/2022    Procedure: COLONOSCOPY, WITH  BIOPSY;  Surgeon: Yancy Schmitz MD;  Location: [facility] OR    COLONOSCOPY N/A 4/3/2023    Procedure: COLONOSCOPY, WITH BIOPSY;  Surgeon: Yancy Schmitz MD;  Location: [facility] OR       PREVIOUS ENDOSCOPY:  Results for orders placed or performed during the hospital encounter of 04/03/23   COLONOSCOPY   Result Value Ref Range    COLONOSCOPY       [facility]  [address] [place] [id number] [phone]     Endoscopy Department  _______________________________________________________________________________  Patient Name: Arleen Owusu              Procedure Date: 4/3/2023 11:15 AM  MRN: [id number]                       Account Number: [id number]  YOB: 1995             Admit Type: Outpatient  Age: 27                               Room: [facility] PROCEDURE ROOM 03  Gender: Female                        Note Status: Finalized  Attending MD: YANCY SCHMITZ MD  Total Sedation Time:   _______________________________________________________________________________     Procedure:             Colonoscopy  Indications:            Disease activity assessment of Crohn's disease of the                          colon. On Apriso 1.5 grams daily with Entocort taper                          in 11-12/2022  Providers:             YANCY SCHMITZ MD, Roselia Laureano RN, Kelsi Yoder MD:          IRVING PEREZ  Requesting Provider:   Shanae Perez  Medicines:             Monitored Anesthesia Care  Complications:         No immediate complications.  _______________________________________________________________________________  Procedure:             Pre-Anesthesia Assessment:                         - See EPIC H and P note                         After obtaining informed consent, the colonoscope was                          passed under direct vision. Throughout the procedure,                          the patient's blood pressure, pulse, and oxygen                          saturations were monitored continuously. The                          Colonoscope was introduced through the anus and                          advanced to the terminal ileum, with identification of                          the appendiceal orifice and IC valve. The colonoscopy                          was performed without difficulty. The patient                          tolerated the procedure well. The quality o f the bowel                          preparation was evaluated using the BBPS ([place] Bowel                          Preparation Scale) with scores of: Right Colon = 2                          (minor amount of residual staining, small fragments of                          stool and/or opaque liquid, but mucosa seen well),                          Transverse Colon = 3 (entire mucosa seen well with no                          residual staining, small fragments of stool or opaque                          liquid) and Left Colon = 3 (entire mucosa seen well                          with no residual staining, small fragments of stool or                           opaque liquid). The total BBPS score equals 8. The                          quality of the bowel preparation was good.                                                                                   Findings:       Skin tags were found on perianal exam.       The Simple Endoscopic Score for Crohn's Disease was determined based on         the endoscopic appearance of the mucosa in the following segments:       - Ileum: Findings include no ulcers present, no ulcerated surfaces, no        affected surfaces and no narrowings. Segment score: 0.       - Right Colon: Findings include aphthous ulcers less than 0.5 cm in        size, 10-30% ulcerated surfaces, 50-75% of surfaces affected and no        narrowings. Segment score: 5.       - Transverse Colon: Findings include no ulcers present, no ulcerated        surfaces, no affected surfaces and no narrowings. Segment score: 0.       - Left Colon: Findings include no ulcers present, no ulcerated surfaces,        no affected surfaces and no narrowings. Segment score: 0.       - Rectum: Findings include no ulcers present, no ulcerated surfaces, no        affected surfaces and no narrowings. Segment score: 0.       - Total SES-CD aggregate score: 5. Biopsies were taken with a cold        forceps for histology (cecum/ascending, transverse, descending/sigmoid,        rectum in  separate jars).       The terminal ileum appeared normal.                                                                                   Impression:            - Perianal skin tags found on perianal exam.                         - Simple Endoscopic Score for Crohn's Disease: 5,                          mucosal inflammatory changes secondary to Crohn's                          disease with colonic involvement. Biopsied.                         - The examined portion of the ileum was normal.                         Moderate inflammation in cecum, IC valve and proximal                           ascending with erythema, aphthous ulcers and loss of                          vasculature. Consistent with moderate Crohn's that is                          worse than 1 year ago. Mesalamine is not adequate.                          Will have her follow up in clinic to discuss                          escalation of therapy to biologics.  Recommendation:        - Discharge patient to home (with es lizette).                         - Resume previous diet.                         - Continue present medications.                         - Await pathology results.                         - Repeat colonoscopy in 4 years for surveillance.                         - Return to GI clinic - see above discussion                                                                                     Electronically Signed by: Dr. Luis Alberto Schmitz  ___________________________  LUIS ALBERTO SCHMITZ MD  4/3/2023 3:09:24 PM  I was physically present for the entire viewing portion of the exam.  __________________________  Signature of teaching physician  LUIS ALBERTO SCHMITZ MD  Number of Addenda: 0    Note Initiated On: 4/3/2023 11:15 AM  Scope In:  Scope Out:     ]    ALLERGIES:     Allergies   Allergen Reactions    Ibuprofen      Pt has an autoimmune disease it causes a negative colon reaction , can tolerate tylenol    Levonorgestrel-Ethinyl Estrad Headache, Hives and Itching    Seasonale Headache, Hives and Itching    Latex Rash       PERTINENT MEDICATIONS:    Current Outpatient Medications:     buPROPion (WELLBUTRIN XL) 150 MG 24 hr tablet, Take 1 tablet (150 mg) by mouth every morning, Disp: 90 tablet, Rfl: 1    cyanocobalamin (CYANOCOBALAMIN) 1000 MCG/ML injection, Inject 1 mL (1,000 mcg) into the muscle every 30 days, Disp: 1 mL, Rfl: 11    EPINEPHrine (ANY BX GENERIC EQUIV) 0.3 MG/0.3ML injection 2-pack, , Disp: , Rfl:     fluticasone (FLONASE) 50 MCG/ACT nasal spray, Spray 1-2 sprays into both nostrils 2 times daily as needed, Disp: ,  "Rfl:     levonorgestrel (KYLEENA) 19.5 MG IUD, 1 each (19.5 mg) by Intrauterine route once, Disp: , Rfl:     NEEDLES, ANY SIZE, Please use 27 gauge 1 inch needle to inject subcutaneous stelara as directed every 8 weeks., Disp: 10 each, Rfl: 1    sertraline (ZOLOFT) 100 MG tablet, Take 1 tablet (100 mg) by mouth 2 times daily, Disp: 180 tablet, Rfl: 3    Syringe/Needle, Disp, 20G X 1-1/2\" 3 ML MISC, Use as directed with Stelara every 8 weeks., Disp: 10 each, Rfl: 1    ustekinumab (STELARA) 45 MG/0.5ML SOLN, Inject 90mg (1mL) subcutaneously every 8 weeks, Disp: 1 mL, Rfl: 3    Current Facility-Administered Medications:     cyanocobalamin injection 1,000 mcg, 1,000 mcg, Intramuscular, Q30 Days, Luis Alberto Jacobs MD, 1,000 mcg at 11/07/23 0852    SOCIAL HISTORY:  Social History     Socioeconomic History    Marital status: Single     Spouse name: Not on file    Number of children: Not on file    Years of education: Not on file    Highest education level: Not on file   Occupational History    Not on file   Tobacco Use    Smoking status: Never    Smokeless tobacco: Never   Vaping Use    Vaping Use: Never used   Substance and Sexual Activity    Alcohol use: Not on file    Drug use: Not on file    Sexual activity: Not on file   Other Topics Concern    Not on file   Social History Narrative    Not on file     Social Determinants of Health     Financial Resource Strain: Not on file   Food Insecurity: Not on file   Transportation Needs: Not on file   Physical Activity: Not on file   Stress: Not on file   Social Connections: Not on file   Interpersonal Safety: Not on file   Housing Stability: Not on file       FAMILY HISTORY:  No family history on file.    Past/family/social history reviewed and no changes    PHYSICAL EXAMINATION:  Constitutional: aaox3, cooperative, pleasant, not dyspneic/diaphoretic, no acute distress  Vitals reviewed: BP 96/67 (BP Location: Left arm, Patient Position: Sitting, Cuff Size: Adult Large)  " " Pulse 74   Ht 1.702 m (5' 7.01\")   Wt 79.2 kg (174 lb 11.2 oz)   LMP 10/14/2023 (Exact Date)   BMI 27.35 kg/m    Wt:   Wt Readings from Last 2 Encounters:   01/02/24 79.2 kg (174 lb 11.2 oz)   11/15/23 78.7 kg (173 lb 8 oz)      Eyes: Sclera anicteric/injected  Ears/nose/mouth/throat: Normal oropharynx without ulcers or exudate, mucus membranes moist, hearing intact  Neck: supple, thyroid normal size  CV: No edema  Respiratory: Unlabored breathing  Lymph: No axillary, submandibular, supraclavicular or inguinal lymphadenopathy  Abd:  Nondistended, +bs, no hepatosplenomegaly, nontender, no peritoneal signs  Skin: warm, perfused, no jaundice  Psych: Normal affect  MSK: Normal gait      PERTINENT STUDIES:  Most recent CBC:  Recent Labs   Lab Test 10/02/23  0748 08/29/23  1621   WBC 8.2 7.7   HGB 12.4 13.9   HCT 37.0 40.7    259     Most recent hepatic panel:  Recent Labs   Lab Test 10/02/23  0748 08/29/23  1621   ALT 36 23   AST 24 21     Most recent creatinine:  Recent Labs   Lab Test 10/02/23  0748 05/23/23  0913   CR 0.63 0.79             Answers submitted by the patient for this visit:  Symptoms you have experienced in the last 30 days (Submitted on 1/2/2024)  General Symptoms: No  Skin Symptoms: No  HENT Symptoms: No  EYE SYMPTOMS: No  HEART SYMPTOMS: No  LUNG SYMPTOMS: No  INTESTINAL SYMPTOMS: No  URINARY SYMPTOMS: No  GYNECOLOGIC SYMPTOMS: Yes  BREAST SYMPTOMS: No  SKELETAL SYMPTOMS: No  BLOOD SYMPTOMS: No  NERVOUS SYSTEM SYMPTOMS: No  MENTAL HEALTH SYMPTOMS: No  Please answer the questions below to tell us what condition you are experiencing: (Submitted on 1/2/2024)  Bleeding or spotting between periods: No  Heavy or painful periods: Yes  Irregular periods: Yes  Vaginal discharge: No  Hot flashes: No  Vaginal dryness: No  Genital ulcers: No  Reduced libido: No  Painful intercourse: No  Difficulty with sexual arousal: No  Post-menopausal bleeding: No    "

## 2024-01-03 ENCOUNTER — TELEPHONE (OUTPATIENT)
Dept: GASTROENTEROLOGY | Facility: CLINIC | Age: 29
End: 2024-01-03

## 2024-01-03 DIAGNOSIS — K50.10 CROHN'S DISEASE OF COLON WITHOUT COMPLICATION (H): ICD-10-CM

## 2024-01-03 NOTE — PROGRESS NOTES
Additional refills for Stelara sent.     Last provider visit: 1/2/24  Next provider visit: RTC March/April  Last labs completed: 1/2/24  Lab frequency: every 3 months   - standing labs   Next labs due: around April 2024    Last IBD Health Maintenance Review: 10/16/23  PDC: 91%

## 2024-01-03 NOTE — TELEPHONE ENCOUNTER
Writer received a message from Dr. Luis Alberto Jacobs to help push back Pt's appointment with Lan Trent on 1/17/2024 to March or April, after Pt's procedure on 2/19/2024. Writer called and left message for the Pt, along with call back number.    Writer will also sent Pt a [employer] message.

## 2024-01-08 ENCOUNTER — TRANSFERRED RECORDS (OUTPATIENT)
Dept: HEALTH INFORMATION MANAGEMENT | Facility: CLINIC | Age: 29
End: 2024-01-08
Payer: COMMERCIAL

## 2024-01-09 NOTE — TELEPHONE ENCOUNTER
Patient called back and is now scheduled for follow up with Lan Trent in April after colonoscopy. She is also scheduled for B-12 Injection.     Debbie Go

## 2024-01-12 ENCOUNTER — OFFICE VISIT (OUTPATIENT)
Dept: OBGYN | Facility: CLINIC | Age: 29
End: 2024-01-12
Attending: MIDWIFE
Payer: COMMERCIAL

## 2024-01-12 VITALS
HEART RATE: 67 BPM | DIASTOLIC BLOOD PRESSURE: 70 MMHG | BODY MASS INDEX: 27.15 KG/M2 | WEIGHT: 173 LBS | HEIGHT: 67 IN | SYSTOLIC BLOOD PRESSURE: 107 MMHG

## 2024-01-12 DIAGNOSIS — Z30.432 ENCOUNTER FOR IUD REMOVAL: Primary | ICD-10-CM

## 2024-01-12 PROCEDURE — 58301 REMOVE INTRAUTERINE DEVICE: CPT | Performed by: MIDWIFE

## 2024-01-12 PROCEDURE — 96372 THER/PROPH/DIAG INJ SC/IM: CPT | Mod: 59 | Performed by: MIDWIFE

## 2024-01-12 PROCEDURE — 250N000011 HC RX IP 250 OP 636: Mod: JZ | Performed by: MIDWIFE

## 2024-01-12 RX ORDER — CLINDAMYCIN PHOSPHATE 10 UG/ML
LOTION TOPICAL
COMMUNITY
Start: 2024-01-08

## 2024-01-12 RX ORDER — MEDROXYPROGESTERONE ACETATE 150 MG/ML
150 INJECTION, SUSPENSION INTRAMUSCULAR
Status: ACTIVE | OUTPATIENT
Start: 2024-01-12 | End: 2025-01-06

## 2024-01-12 RX ADMIN — MEDROXYPROGESTERONE ACETATE 150 MG: 150 INJECTION, SUSPENSION INTRAMUSCULAR at 15:41

## 2024-01-12 NOTE — LETTER
1/12/2024       RE: Arleen Owusu  [address] [place] [address]     Dear Colleague,    Thank you for referring your patient, Arleen Owusu, to the [facility] [place] [facility]. Please see a copy of my visit note below.    IUD Removal:  SUBJECTIVE:    Is a pregnancy test required: No.  Was a consent obtained?  Yes    Arleen Owusu is a 28 year old female,No obstetric history on file., No LMP recorded. (Menstrual status: IUD). who presents today for IUD removal. Her current IUD was placed 1/2023 ago. She has had problems including prolonged spotting/bleeding lasting three weeks with the IUD. She requests removal of the IUD because she wants to change her method of contraception    Discussed birth control methods. Pt interested in Depo which she has used in the past. Reviewed r/b/a.     Today's PHQ-2 Score:       8/22/2023     9:42 AM   PHQ-2 ( 1999 Pfizer)   Q1: Little interest or pleasure in doing things 1   Q2: Feeling down, depressed or hopeless 0   PHQ-2 Score 1       PROCEDURE:    A speculum exam was performed and the cervix was visualized. The IUD string was visualized. Using ring forceps, the string  was grasped and the IUD removed intact.    POST PROCEDURE:    The patient tolerated the procedure well. Patient was discharged in stable condition.    Call if bleeding, pain or fever occur. and Birth control counseling given.    Depo given. RTC every 3 months.        GLORIA Yousif CNM

## 2024-01-12 NOTE — NURSING NOTE
Clinic Administered Medication Documentation        Patient was given Depo Provera. Prior to medication administration, verified patient's identity using patient s name and date of birth. Please see MAR and medication order for additional information. Patient instructed to remain in clinic for 15 minutes, report any adverse reaction to staff immediately, and remain in clinic for 15 minutes and report any adverse reaction to staff immediately but patient declined.    Vial/Syringe: Single dose vial. Was entire vial of medication used? Yes    NEXT INJECTION DUE: 3/29/24 - 4/26/24

## 2024-01-12 NOTE — PROGRESS NOTES
IUD Removal:  SUBJECTIVE:    Is a pregnancy test required: No.  Was a consent obtained?  Yes    Arelen Owusu is a 28 year old female,No obstetric history on file., No LMP recorded. (Menstrual status: IUD). who presents today for IUD removal. Her current IUD was placed 1/2023 ago. She has had problems including prolonged spotting/bleeding lasting three weeks with the IUD. She requests removal of the IUD because she wants to change her method of contraception    Discussed birth control methods. Pt interested in Depo which she has used in the past. Reviewed r/b/a.     Today's PHQ-2 Score:       8/22/2023     9:42 AM   PHQ-2 ( 1999 Pfizer)   Q1: Little interest or pleasure in doing things 1   Q2: Feeling down, depressed or hopeless 0   PHQ-2 Score 1       PROCEDURE:    A speculum exam was performed and the cervix was visualized. The IUD string was visualized. Using ring forceps, the string  was grasped and the IUD removed intact.    POST PROCEDURE:    The patient tolerated the procedure well. Patient was discharged in stable condition.    Call if bleeding, pain or fever occur. and Birth control counseling given.    Depo given. RTC every 3 months.        GLORIA Yousif CNM

## 2024-01-17 ENCOUNTER — TELEPHONE (OUTPATIENT)
Dept: GASTROENTEROLOGY | Facility: CLINIC | Age: 29
End: 2024-01-17

## 2024-01-17 ENCOUNTER — ALLIED HEALTH/NURSE VISIT (OUTPATIENT)
Dept: GASTROENTEROLOGY | Facility: CLINIC | Age: 29
End: 2024-01-17
Payer: COMMERCIAL

## 2024-01-17 DIAGNOSIS — E53.8 VITAMIN B12 DEFICIENCY (NON ANEMIC): Primary | ICD-10-CM

## 2024-01-17 PROCEDURE — 99207 PR NO CHARGE NURSE ONLY: CPT

## 2024-01-17 PROCEDURE — 96372 THER/PROPH/DIAG INJ SC/IM: CPT | Performed by: INTERNAL MEDICINE

## 2024-01-17 RX ADMIN — CYANOCOBALAMIN 1000 MCG: 1000 INJECTION, SOLUTION INTRAMUSCULAR; SUBCUTANEOUS at 11:31

## 2024-01-17 NOTE — NURSING NOTE
Arleen Owusu comes into clinic today at the request of Luis Alberto Jacobs Ordering Provider for Med Injection only Cyanocobalamin B12. (1000MCG/ML)     Injection was given in the left deltoid.     This service provided today was under the supervising provider of the day Dr. Yamilet Madrid, who was available if needed.     Debbie Go

## 2024-01-17 NOTE — TELEPHONE ENCOUNTER
[facility] Call Center    Phone Message    May a detailed message be left on voicemail: yes     Reason for Call: Other: Patient requesting to possibly change her Nurse Only appt today to an earlier time.  She would like 10am-12pm if possible.  If not, she'll keep her 3pm.  Please call to discuss.      Action Taken: Message routed to:  [facility] ([facility]): GI    Travel Screening: Not Applicable

## 2024-02-06 ENCOUNTER — TELEPHONE (OUTPATIENT)
Dept: GASTROENTEROLOGY | Facility: CLINIC | Age: 29
End: 2024-02-06
Payer: COMMERCIAL

## 2024-02-06 NOTE — TELEPHONE ENCOUNTER
Pre visit planning completed.      Procedure details:    Patient scheduled for Colonoscopy  on 2/19/24.     Arrival time: 1415. Procedure time 1515    Pre op exam needed? N/A    Facility location: [facility]; [address] [place] [address], [address] Floor, [place] [address]    Sedation type: MAC    Indication for procedure: crohns      Chart review:     Electronic implanted devices? No    Recent diagnosis of diverticulitis within the last 6 weeks? No    Diabetic? No    Medication review:    Anticoagulants? No    NSAIDS? No NSAID medications per patient's medication list.  RN will verify with pre-assessment call.    Other medication HOLDING recommendations:  N/A      Prep for procedure:     Bowel prep recommendation: Standard Miralax   Due to:  standard bowel prep.    Prep instructions sent via zeb Torres RN  Endoscopy Procedure Pre Assessment RN  [phone] option 4

## 2024-02-06 NOTE — TELEPHONE ENCOUNTER
Attempted to contact patient in order to complete pre assessment questions.     No answer. Left message to return call to [phone] option 4    Missed call communication sent via [employer].    Sheridan Ferrera RN  Endoscopy Procedure Pre Assessment RN  [phone] option 4

## 2024-02-08 NOTE — TELEPHONE ENCOUNTER
Pre assessment completed for upcoming procedure.   (Please see previous telephone encounter notes for complete details)    Patient  returned call.       Procedure details:    Arrival time and facility location reviewed.    Pre op exam needed? N/A    Designated  policy reviewed. Instructed to have someone stay 24  hours post procedure.     COVID policy reviewed.      Medication review:    Medications reviewed. Please see supporting documentation below. Holding recommendations discussed (if applicable).       Prep for procedure:     Procedure prep instructions reviewed.        Any additional information needed:  N/A      Patient  verbalized understanding and had no questions or concerns at this time.      Kirstie Emanuel RN  Endoscopy Procedure Pre Assessment RN  [phone] option 4

## 2024-02-19 ENCOUNTER — HOSPITAL ENCOUNTER (OUTPATIENT)
Facility: AMBULATORY SURGERY CENTER | Age: 29
Discharge: HOME | End: 2024-02-19
Attending: INTERNAL MEDICINE | Admitting: INTERNAL MEDICINE
Payer: COMMERCIAL

## 2024-02-19 ENCOUNTER — ANESTHESIA EVENT (OUTPATIENT)
Dept: SURGERY | Facility: AMBULATORY SURGERY CENTER | Age: 29
End: 2024-02-19
Payer: COMMERCIAL

## 2024-02-19 ENCOUNTER — ANESTHESIA (OUTPATIENT)
Dept: SURGERY | Facility: AMBULATORY SURGERY CENTER | Age: 29
End: 2024-02-19
Payer: COMMERCIAL

## 2024-02-19 VITALS
DIASTOLIC BLOOD PRESSURE: 59 MMHG | TEMPERATURE: 97.6 F | RESPIRATION RATE: 12 BRPM | HEART RATE: 59 BPM | SYSTOLIC BLOOD PRESSURE: 99 MMHG | BODY MASS INDEX: 26.68 KG/M2 | OXYGEN SATURATION: 100 % | WEIGHT: 170 LBS | HEIGHT: 67 IN

## 2024-02-19 VITALS — HEART RATE: 66 BPM

## 2024-02-19 PROCEDURE — 45380 COLONOSCOPY AND BIOPSY: CPT

## 2024-02-19 PROCEDURE — 45380 COLONOSCOPY AND BIOPSY: CPT | Mod: 59 | Performed by: INTERNAL MEDICINE

## 2024-02-19 PROCEDURE — 45380 COLONOSCOPY AND BIOPSY: CPT | Performed by: ANESTHESIOLOGY

## 2024-02-19 PROCEDURE — 45385 COLONOSCOPY W/LESION REMOVAL: CPT | Performed by: INTERNAL MEDICINE

## 2024-02-19 PROCEDURE — 88305 TISSUE EXAM BY PATHOLOGIST: CPT | Mod: TC | Performed by: INTERNAL MEDICINE

## 2024-02-19 PROCEDURE — 81025 URINE PREGNANCY TEST: CPT | Performed by: PATHOLOGY

## 2024-02-19 PROCEDURE — 88305 TISSUE EXAM BY PATHOLOGIST: CPT | Mod: 26 | Performed by: PATHOLOGY

## 2024-02-19 RX ORDER — PROPOFOL 10 MG/ML
INJECTION, EMULSION INTRAVENOUS CONTINUOUS PRN
Status: DISCONTINUED | OUTPATIENT
Start: 2024-02-19 | End: 2024-02-19

## 2024-02-19 RX ORDER — PROPOFOL 10 MG/ML
INJECTION, EMULSION INTRAVENOUS PRN
Status: DISCONTINUED | OUTPATIENT
Start: 2024-02-19 | End: 2024-02-19

## 2024-02-19 RX ORDER — SODIUM CHLORIDE, SODIUM LACTATE, POTASSIUM CHLORIDE, CALCIUM CHLORIDE 600; 310; 30; 20 MG/100ML; MG/100ML; MG/100ML; MG/100ML
INJECTION, SOLUTION INTRAVENOUS CONTINUOUS
Status: DISCONTINUED | OUTPATIENT
Start: 2024-02-19 | End: 2024-02-20 | Stop reason: HOSPADM

## 2024-02-19 RX ORDER — ONDANSETRON 2 MG/ML
4 INJECTION INTRAMUSCULAR; INTRAVENOUS
Status: DISCONTINUED | OUTPATIENT
Start: 2024-02-19 | End: 2024-02-20 | Stop reason: HOSPADM

## 2024-02-19 RX ORDER — LIDOCAINE 40 MG/G
CREAM TOPICAL
Status: DISCONTINUED | OUTPATIENT
Start: 2024-02-19 | End: 2024-02-20 | Stop reason: HOSPADM

## 2024-02-19 RX ORDER — LIDOCAINE HYDROCHLORIDE 20 MG/ML
INJECTION, SOLUTION INFILTRATION; PERINEURAL PRN
Status: DISCONTINUED | OUTPATIENT
Start: 2024-02-19 | End: 2024-02-19

## 2024-02-19 RX ORDER — SODIUM CHLORIDE, SODIUM LACTATE, POTASSIUM CHLORIDE, CALCIUM CHLORIDE 600; 310; 30; 20 MG/100ML; MG/100ML; MG/100ML; MG/100ML
INJECTION, SOLUTION INTRAVENOUS CONTINUOUS PRN
Status: DISCONTINUED | OUTPATIENT
Start: 2024-02-19 | End: 2024-02-19

## 2024-02-19 RX ADMIN — LIDOCAINE HYDROCHLORIDE 80 MG: 20 INJECTION, SOLUTION INFILTRATION; PERINEURAL at 15:30

## 2024-02-19 RX ADMIN — SODIUM CHLORIDE, SODIUM LACTATE, POTASSIUM CHLORIDE, CALCIUM CHLORIDE: 600; 310; 30; 20 INJECTION, SOLUTION INTRAVENOUS at 15:28

## 2024-02-19 RX ADMIN — SODIUM CHLORIDE, SODIUM LACTATE, POTASSIUM CHLORIDE, CALCIUM CHLORIDE: 600; 310; 30; 20 INJECTION, SOLUTION INTRAVENOUS at 14:34

## 2024-02-19 RX ADMIN — PROPOFOL 40 MG: 10 INJECTION, EMULSION INTRAVENOUS at 15:34

## 2024-02-19 RX ADMIN — PROPOFOL 200 MCG/KG/MIN: 10 INJECTION, EMULSION INTRAVENOUS at 15:32

## 2024-02-19 NOTE — ANESTHESIA CARE TRANSFER NOTE
Patient: Arleen Owusu    Procedure: Procedure(s):  COLONOSCOPY, WITH POLYPECTOMY AND BIOPSY, WITH CLIP       Diagnosis: Crohn's disease of colon without complication (H) [K50.10]  Diagnosis Additional Information: No value filed.    Anesthesia Type:   MAC     Note:    Oropharynx: spontaneously breathing  Level of Consciousness: awake  Oxygen Supplementation: room air    Independent Airway: airway patency satisfactory and stable  Dentition: dentition unchanged  Vital Signs Stable: post-procedure vital signs reviewed and stable  Report to RN Given: handoff report given  Patient transferred to: Phase II    Handoff Report: Identifed the Patient, Identified the Reponsible Provider, Reviewed the pertinent medical history, Discussed the surgical course, Reviewed Intra-OP anesthesia mangement and issues during anesthesia, Set expectations for post-procedure period and Allowed opportunity for questions and acknowledgement of understanding      Vitals:  Vitals Value Taken Time   BP 94/58    Temp 96.8    Pulse 63    Resp     SpO2 100        Electronically Signed By: GLORIA Persaud CRNA  February 19, 2024  4:00 PM

## 2024-02-19 NOTE — ANESTHESIA PREPROCEDURE EVALUATION
Anesthesia Pre-Procedure Evaluation    Patient: Arleen Owusu   MRN: [id number] : 1995        Procedure : Procedure(s):  Colonoscopy          No past medical history on file.   Past Surgical History:   Procedure Laterality Date    COLONOSCOPY N/A 2022    Procedure: COLONOSCOPY, WITH  BIOPSY;  Surgeon: Luis Alberto Jacobs MD;  Location: [facility] OR    COLONOSCOPY N/A 4/3/2023    Procedure: COLONOSCOPY, WITH BIOPSY;  Surgeon: Luis Alberto Jacobs MD;  Location: [facility] OR      Allergies   Allergen Reactions    Ibuprofen      Pt has an autoimmune disease it causes a negative colon reaction , can tolerate tylenol    Levonorgestrel-Ethinyl Estrad Headache, Hives and Itching    Seasonale Headache, Hives and Itching    Latex Rash      Social History     Tobacco Use    Smoking status: Never    Smokeless tobacco: Never   Substance Use Topics    Alcohol use: Not on file      Wt Readings from Last 1 Encounters:   24 78.5 kg (173 lb)        Anesthesia Evaluation            ROS/MED HX  ENT/Pulmonary:       Neurologic:       Cardiovascular:       METS/Exercise Tolerance:     Hematologic:       Musculoskeletal:       GI/Hepatic:     (+)       Inflammatory bowel disease,             Renal/Genitourinary:       Endo:       Psychiatric/Substance Use:       Infectious Disease:       Malignancy:       Other:            Physical Exam    Airway        Mallampati: II       Respiratory Devices and Support         Dental       (+) Minor Abnormalities - some fillings, tiny chips      Cardiovascular          Rhythm and rate: regular     Pulmonary           breath sounds clear to auscultation           OUTSIDE LABS:  CBC:   Lab Results   Component Value Date    WBC 6.6 2024    WBC 8.2 10/02/2023    HGB 13.1 2024    HGB 12.4 10/02/2023    HCT 37.6 2024    HCT 37.0 10/02/2023     2024     10/02/2023     BMP:   Lab Results   Component Value Date     2024     10/02/2023  Outpatient follow-up with PCP in regards to this matter "   POTASSIUM 4.1 01/02/2024    POTASSIUM 4.2 10/02/2023    CHLORIDE 106 01/02/2024    CHLORIDE 104 10/02/2023    CO2 23 01/02/2024    CO2 22 10/02/2023    BUN 8.5 01/02/2024    BUN 9.7 10/02/2023    CR 0.66 01/02/2024    CR 0.63 10/02/2023     (H) 01/02/2024     (H) 10/02/2023     COAGS: No results found for: \"PTT\", \"INR\", \"FIBR\"  POC:   Lab Results   Component Value Date    HCG Negative 04/03/2023     HEPATIC:   Lab Results   Component Value Date    ALBUMIN 4.4 01/02/2024    PROTTOTAL 7.0 01/02/2024    ALT 19 01/02/2024    AST 19 01/02/2024    ALKPHOS 99 01/02/2024    BILITOTAL 0.3 01/02/2024     OTHER:   Lab Results   Component Value Date    VERITO 9.4 01/02/2024    TSH 1.24 05/23/2023    CRP 0.3 02/04/2022    SED 10 01/02/2024       Anesthesia Plan    ASA Status:  2    NPO Status:  NPO Appropriate    Anesthesia Type: MAC.     - Reason for MAC: immobility needed              Consents    Anesthesia Plan(s) and associated risks, benefits, and realistic alternatives discussed. Questions answered and patient/representative(s) expressed understanding.     - Discussed:     - Discussed with:  Patient            Postoperative Care            Comments:               Ulisses Meija MD    I have reviewed the pertinent notes and labs in the chart from the past 30 days and (re)examined the patient.  Any updates or changes from those notes are reflected in this note.                  "

## 2024-02-19 NOTE — DISCHARGE INSTRUCTIONS
[facility]  Home Care Following Anesthesia  For 24 hours after surgery:  Get plenty of rest.  A responsible adult must stay with you for at least 24 hours after you leave the surgery center.  Do not drive or use heavy equipment.  If you have weakness or tingling, don't drive or use heavy equipment until this feeling goes away.   Do not drink alcohol.   Avoid strenuous or risky activities.  Ask for help when climbing stairs.  You may feel lightheaded.  IF so, sit for a few minutes before standing.  Have someone help you get up.   If you have nausea (feel sick to your stomach): Drink only clear liquids such as apple juice, ginger ale, broth or 7-Up.  Rest may also help.  Be sure to drink enough fluids.  Move to a regular diet as you feel able.   You may have a slight fever.  Call the doctor if your fever is over 100 F (37.7 C) (taken under the tongue) or lasts longer than 24 hours.  You may have a dry mouth, a sore throat, muscle aches or trouble sleeping. These should go away after 24 hours.  Do not make important or legal decisions.   It is recommended to avoid smoking.               Tips for taking pain medications  To get the best pain relief possible, remember these points:  Take pain medications as directed, before pain becomes severe.  Pain medication can upset your stomach: taking it with food may help.  Constipation is a common side effect of pain medication. Drink plenty of  fluids.  Eat foods high in fiber. Take a stool softener if recommended by your doctor or pharmacist.  Do not drink alcohol, drive or operate machinery while taking pain medications.  Ask about other ways to control pain, such as with heat, ice or relaxation.    Tylenol/Acetaminophen Consumption    If you feel your pain relief is insufficient, you may take Tylenol/Acetaminophen in addition to your narcotic pain medication.   Be careful not to exceed 4,000 mg of Tylenol/Acetaminophen in a 24 hour  period from all sources.  If you are taking extra strength Tylenol/acetaminophen (500 mg), the maximum dose is 8 tablets in 24 hours.  If you are taking regular strength acetaminophen (325 mg), the maximum dose is 12 tablets in 24 hours.    Call a doctor for any of the following:  Signs of infection (fever, growing tenderness at the surgery site, a large amount of drainage or bleeding, severe pain, foul-smelling drainage, redness, swelling).  It has been over 8 to 10 hours since surgery and you are still not able to urinate (pass water).  Headache for over 24 hours.  Numbness, tingling or weakness the day after surgery (if you had spinal anesthesia).  Signs of Covid-19 infection (temperature over 100 degrees, shortness of breath, cough, loss of taste/smell, generalized body aches, persistent headache, chills, sore throat, nausea/vomiting/diarrhea)  Your doctor is: MD Luis Alberto Woodson

## 2024-02-19 NOTE — ANESTHESIA POSTPROCEDURE EVALUATION
Patient: Arleen Owusu    Procedure: Procedure(s):  COLONOSCOPY, WITH POLYPECTOMY AND BIOPSY, WITH CLIP       Anesthesia Type:  MAC    Note:  Disposition: Outpatient   Postop Pain Control: Uneventful            Sign Out: Well controlled pain   PONV: No   Neuro/Psych: Uneventful            Sign Out: Acceptable/Baseline neuro status   Airway/Respiratory: Uneventful            Sign Out: Acceptable/Baseline resp. status   CV/Hemodynamics: Uneventful            Sign Out: Acceptable CV status; No obvious hypovolemia; No obvious fluid overload   Other NRE: NONE   DID A NON-ROUTINE EVENT OCCUR?            Last vitals:  Vitals Value Taken Time   BP 99/59 02/19/24 1615   Temp 36.4  C (97.6  F) 02/19/24 1615   Pulse 59 02/19/24 1615   Resp 12 02/19/24 1615   SpO2 100 % 02/19/24 1615       Electronically Signed By: Ulisses Mejia MD  February 19, 2024  4:53 PM

## 2024-02-29 ENCOUNTER — MYC MEDICAL ADVICE (OUTPATIENT)
Dept: GASTROENTEROLOGY | Facility: CLINIC | Age: 29
End: 2024-02-29

## 2024-02-29 DIAGNOSIS — K50.10 CROHN'S DISEASE OF COLON WITHOUT COMPLICATION (H): Primary | ICD-10-CM

## 2024-02-29 DIAGNOSIS — R19.7 DIARRHEA: ICD-10-CM

## 2024-02-29 NOTE — TELEPHONE ENCOUNTER
Contacted patient to discuss [employer] message.     Patient had colonoscopy procedure on 2/19.     Following the procedure, stools returned to baseline. Patient reports one episode of constipation last Saturday, which she took Miralax for. Otherwise denies any other symptoms since the scope up until today.     Reports bloody stools started this morning/early afternoon. Has experienced hemorrhoids in the past, and this seems to be more blood than that.     Denies fevers or abdominal pain since colonoscopy procedure.     No other symptoms to note.     Routed to Dr. Jacobs for an update.

## 2024-03-01 NOTE — TELEPHONE ENCOUNTER
Discussed with Dr. Jacobs -    Continue to monitor symptoms.     Procedure showed no active inflammation.    If diarrhea is ongoing, complete stool studies.     If blood returns/worsens over the weekend present to the ED.

## 2024-03-02 ENCOUNTER — HOSPITAL ENCOUNTER (EMERGENCY)
Facility: CLINIC | Age: 29
Discharge: HOME | End: 2024-03-03
Attending: EMERGENCY MEDICINE | Admitting: EMERGENCY MEDICINE
Payer: COMMERCIAL

## 2024-03-02 ENCOUNTER — LAB (OUTPATIENT)
Dept: LAB | Facility: CLINIC | Age: 29
End: 2024-03-02
Payer: COMMERCIAL

## 2024-03-02 DIAGNOSIS — R19.7 DIARRHEA, UNSPECIFIED TYPE: ICD-10-CM

## 2024-03-02 DIAGNOSIS — R19.7 DIARRHEA: ICD-10-CM

## 2024-03-02 DIAGNOSIS — E86.0 DEHYDRATION: ICD-10-CM

## 2024-03-02 DIAGNOSIS — K50.10 CROHN'S DISEASE OF COLON WITHOUT COMPLICATION (H): ICD-10-CM

## 2024-03-02 LAB
BASOPHILS # BLD AUTO: 0 10E3/UL (ref 0–0.2)
BASOPHILS NFR BLD AUTO: 0 %
EOSINOPHIL # BLD AUTO: 0.1 10E3/UL (ref 0–0.7)
EOSINOPHIL NFR BLD AUTO: 1 %
ERYTHROCYTE [DISTWIDTH] IN BLOOD BY AUTOMATED COUNT: 13.9 % (ref 10–15)
HCT VFR BLD AUTO: 39.3 % (ref 35–47)
HGB BLD-MCNC: 13.5 G/DL (ref 11.7–15.7)
IMM GRANULOCYTES # BLD: 0 10E3/UL
IMM GRANULOCYTES NFR BLD: 0 %
LYMPHOCYTES # BLD AUTO: 1 10E3/UL (ref 0.8–5.3)
LYMPHOCYTES NFR BLD AUTO: 17 %
MCH RBC QN AUTO: 28.5 PG (ref 26.5–33)
MCHC RBC AUTO-ENTMCNC: 34.4 G/DL (ref 31.5–36.5)
MCV RBC AUTO: 83 FL (ref 78–100)
MONOCYTES # BLD AUTO: 0.5 10E3/UL (ref 0–1.3)
MONOCYTES NFR BLD AUTO: 10 %
NEUTROPHILS # BLD AUTO: 3.9 10E3/UL (ref 1.6–8.3)
NEUTROPHILS NFR BLD AUTO: 71 %
PLATELET # BLD AUTO: 231 10E3/UL (ref 150–450)
RBC # BLD AUTO: 4.73 10E6/UL (ref 3.8–5.2)
WBC # BLD AUTO: 5.5 10E3/UL (ref 4–11)

## 2024-03-02 PROCEDURE — 84155 ASSAY OF PROTEIN SERUM: CPT

## 2024-03-02 PROCEDURE — 87507 IADNA-DNA/RNA PROBE TQ 12-25: CPT | Mod: 59

## 2024-03-02 PROCEDURE — 87493 C DIFF AMPLIFIED PROBE: CPT

## 2024-03-02 PROCEDURE — 84460 ALANINE AMINO (ALT) (SGPT): CPT

## 2024-03-02 PROCEDURE — 83993 ASSAY FOR CALPROTECTIN FECAL: CPT

## 2024-03-02 PROCEDURE — 99284 EMERGENCY DEPT VISIT MOD MDM: CPT | Performed by: EMERGENCY MEDICINE

## 2024-03-02 PROCEDURE — 83690 ASSAY OF LIPASE: CPT | Performed by: EMERGENCY MEDICINE

## 2024-03-02 PROCEDURE — 82247 BILIRUBIN TOTAL: CPT

## 2024-03-02 PROCEDURE — 36415 COLL VENOUS BLD VENIPUNCTURE: CPT

## 2024-03-02 PROCEDURE — 85004 AUTOMATED DIFF WBC COUNT: CPT | Performed by: EMERGENCY MEDICINE

## 2024-03-02 PROCEDURE — 86140 C-REACTIVE PROTEIN: CPT

## 2024-03-02 PROCEDURE — 99284 EMERGENCY DEPT VISIT MOD MDM: CPT | Mod: 25 | Performed by: EMERGENCY MEDICINE

## 2024-03-02 PROCEDURE — 85025 COMPLETE CBC W/AUTO DIFF WBC: CPT

## 2024-03-02 PROCEDURE — 82248 BILIRUBIN DIRECT: CPT

## 2024-03-02 PROCEDURE — 258N000003 HC RX IP 258 OP 636: Performed by: EMERGENCY MEDICINE

## 2024-03-02 PROCEDURE — 86140 C-REACTIVE PROTEIN: CPT | Performed by: EMERGENCY MEDICINE

## 2024-03-02 PROCEDURE — 84075 ASSAY ALKALINE PHOSPHATASE: CPT

## 2024-03-02 PROCEDURE — 84450 TRANSFERASE (AST) (SGOT): CPT

## 2024-03-02 PROCEDURE — 80053 COMPREHEN METABOLIC PANEL: CPT | Performed by: EMERGENCY MEDICINE

## 2024-03-02 PROCEDURE — 36415 COLL VENOUS BLD VENIPUNCTURE: CPT | Performed by: EMERGENCY MEDICINE

## 2024-03-02 PROCEDURE — 96361 HYDRATE IV INFUSION ADD-ON: CPT | Performed by: EMERGENCY MEDICINE

## 2024-03-02 RX ORDER — ONDANSETRON 2 MG/ML
4 INJECTION INTRAMUSCULAR; INTRAVENOUS EVERY 30 MIN PRN
Status: DISCONTINUED | OUTPATIENT
Start: 2024-03-02 | End: 2024-03-03 | Stop reason: HOSPADM

## 2024-03-02 RX ADMIN — SODIUM CHLORIDE, POTASSIUM CHLORIDE, SODIUM LACTATE AND CALCIUM CHLORIDE 1000 ML: 600; 310; 30; 20 INJECTION, SOLUTION INTRAVENOUS at 23:39

## 2024-03-02 ASSESSMENT — ACTIVITIES OF DAILY LIVING (ADL): ADLS_ACUITY_SCORE: 33

## 2024-03-03 ENCOUNTER — CARE COORDINATION (OUTPATIENT)
Dept: GASTROENTEROLOGY | Facility: CLINIC | Age: 29
End: 2024-03-03
Payer: COMMERCIAL

## 2024-03-03 VITALS
DIASTOLIC BLOOD PRESSURE: 65 MMHG | HEART RATE: 95 BPM | BODY MASS INDEX: 25.9 KG/M2 | SYSTOLIC BLOOD PRESSURE: 105 MMHG | WEIGHT: 165 LBS | OXYGEN SATURATION: 99 % | TEMPERATURE: 99.1 F | RESPIRATION RATE: 16 BRPM | HEIGHT: 67 IN

## 2024-03-03 DIAGNOSIS — K50.10 CROHN'S DISEASE OF COLON WITHOUT COMPLICATION (H): Primary | ICD-10-CM

## 2024-03-03 LAB
ADV 40+41 DNA STL QL NAA+NON-PROBE: NEGATIVE
ALBUMIN SERPL BCG-MCNC: 4.6 G/DL (ref 3.5–5.2)
ALBUMIN SERPL BCG-MCNC: 4.8 G/DL (ref 3.5–5.2)
ALP SERPL-CCNC: 98 U/L (ref 40–150)
ALP SERPL-CCNC: 99 U/L (ref 40–150)
ALT SERPL W P-5'-P-CCNC: 16 U/L (ref 0–50)
ALT SERPL W P-5'-P-CCNC: 19 U/L (ref 0–50)
ANION GAP SERPL CALCULATED.3IONS-SCNC: 13 MMOL/L (ref 7–15)
AST SERPL W P-5'-P-CCNC: 24 U/L (ref 0–45)
AST SERPL W P-5'-P-CCNC: 24 U/L (ref 0–45)
ASTRO TYP 1-8 RNA STL QL NAA+NON-PROBE: NEGATIVE
BASOPHILS # BLD AUTO: 0 10E3/UL (ref 0–0.2)
BASOPHILS NFR BLD AUTO: 0 %
BILIRUB DIRECT SERPL-MCNC: <0.2 MG/DL (ref 0–0.3)
BILIRUB SERPL-MCNC: 0.6 MG/DL
BILIRUB SERPL-MCNC: 0.7 MG/DL
BUN SERPL-MCNC: 6.4 MG/DL (ref 6–20)
C CAYETANENSIS DNA STL QL NAA+NON-PROBE: NEGATIVE
C DIFF TOX B STL QL: NEGATIVE
CALCIUM SERPL-MCNC: 9 MG/DL (ref 8.6–10)
CAMPYLOBACTER DNA SPEC NAA+PROBE: NEGATIVE
CHLORIDE SERPL-SCNC: 102 MMOL/L (ref 98–107)
CREAT SERPL-MCNC: 0.74 MG/DL (ref 0.51–0.95)
CRP SERPL-MCNC: 69.7 MG/L
CRP SERPL-MCNC: 73.5 MG/L
CRYPTOSP DNA STL QL NAA+NON-PROBE: NEGATIVE
DEPRECATED HCO3 PLAS-SCNC: 21 MMOL/L (ref 22–29)
E COLI O157 DNA STL QL NAA+NON-PROBE: ABNORMAL
E HISTOLYT DNA STL QL NAA+NON-PROBE: NEGATIVE
EAEC ASTA GENE ISLT QL NAA+PROBE: NEGATIVE
EC STX1+STX2 GENES STL QL NAA+NON-PROBE: NEGATIVE
EGFRCR SERPLBLD CKD-EPI 2021: >90 ML/MIN/1.73M2
EOSINOPHIL # BLD AUTO: 0.1 10E3/UL (ref 0–0.7)
EOSINOPHIL NFR BLD AUTO: 1 %
EPEC EAE GENE STL QL NAA+NON-PROBE: NEGATIVE
ERYTHROCYTE [DISTWIDTH] IN BLOOD BY AUTOMATED COUNT: 14 % (ref 10–15)
ETEC LTA+ST1A+ST1B TOX ST NAA+NON-PROBE: NEGATIVE
G LAMBLIA DNA STL QL NAA+NON-PROBE: NEGATIVE
GLUCOSE SERPL-MCNC: 110 MG/DL (ref 70–99)
HCT VFR BLD AUTO: 41.6 % (ref 35–47)
HGB BLD-MCNC: 14.3 G/DL (ref 11.7–15.7)
IMM GRANULOCYTES # BLD: 0.1 10E3/UL
IMM GRANULOCYTES NFR BLD: 1 %
LIPASE SERPL-CCNC: 20 U/L (ref 13–60)
LYMPHOCYTES # BLD AUTO: 0.9 10E3/UL (ref 0.8–5.3)
LYMPHOCYTES NFR BLD AUTO: 15 %
MCH RBC QN AUTO: 29.1 PG (ref 26.5–33)
MCHC RBC AUTO-ENTMCNC: 34.4 G/DL (ref 31.5–36.5)
MCV RBC AUTO: 85 FL (ref 78–100)
MONOCYTES # BLD AUTO: 0.6 10E3/UL (ref 0–1.3)
MONOCYTES NFR BLD AUTO: 9 %
NEUTROPHILS # BLD AUTO: 4.8 10E3/UL (ref 1.6–8.3)
NEUTROPHILS NFR BLD AUTO: 74 %
NOROVIRUS GI+II RNA STL QL NAA+NON-PROBE: NEGATIVE
NRBC # BLD AUTO: 0 10E3/UL
NRBC BLD AUTO-RTO: 0 /100
P SHIGELLOIDES DNA STL QL NAA+NON-PROBE: NEGATIVE
PLATELET # BLD AUTO: 237 10E3/UL (ref 150–450)
POTASSIUM SERPL-SCNC: 3.5 MMOL/L (ref 3.4–5.3)
PROT SERPL-MCNC: 7.4 G/DL (ref 6.4–8.3)
PROT SERPL-MCNC: 7.8 G/DL (ref 6.4–8.3)
RBC # BLD AUTO: 4.92 10E6/UL (ref 3.8–5.2)
RVA RNA STL QL NAA+NON-PROBE: POSITIVE
SALMONELLA SP RPOD STL QL NAA+PROBE: NEGATIVE
SAPO I+II+IV+V RNA STL QL NAA+NON-PROBE: NEGATIVE
SHIGELLA SP+EIEC IPAH ST NAA+NON-PROBE: NEGATIVE
SODIUM SERPL-SCNC: 136 MMOL/L (ref 135–145)
V CHOLERAE DNA SPEC QL NAA+PROBE: NEGATIVE
VIBRIO DNA SPEC NAA+PROBE: NEGATIVE
WBC # BLD AUTO: 6.4 10E3/UL (ref 4–11)
Y ENTEROCOL DNA STL QL NAA+PROBE: NEGATIVE

## 2024-03-03 PROCEDURE — 96374 THER/PROPH/DIAG INJ IV PUSH: CPT | Performed by: EMERGENCY MEDICINE

## 2024-03-03 PROCEDURE — 250N000011 HC RX IP 250 OP 636: Performed by: EMERGENCY MEDICINE

## 2024-03-03 RX ORDER — ONDANSETRON 4 MG/1
4 TABLET, ORALLY DISINTEGRATING ORAL EVERY 8 HOURS PRN
Qty: 10 TABLET | Refills: 0 | Status: SHIPPED | OUTPATIENT
Start: 2024-03-03 | End: 2024-03-06

## 2024-03-03 RX ADMIN — ONDANSETRON 4 MG: 2 INJECTION INTRAMUSCULAR; INTRAVENOUS at 01:03

## 2024-03-03 ASSESSMENT — ACTIVITIES OF DAILY LIVING (ADL): ADLS_ACUITY_SCORE: 35

## 2024-03-03 NOTE — PROGRESS NOTES
Follow up telephone note:    Notified pt went to ER for dehydration. Watery diarrhea. Prior rectal bleeding resolved. Fever to 101F at home. Recent travel to [place]. Colonoscopy 2/19 with no active inflammation - one 5 mm cecal polyp resected and clipped.     Given fluids in ED and felt much better.     Labs reassuring with your CBC. CRP elevated to 70s.     Today reports feeling a bit better. No BMs today yet.    Mild cramping abdominal discomfort that comes and goes    Has not felt febrile but has not taken temperature.    Overall I am most suspicious for an infectious process (norovirus, c diff or other). She seems to be feeling better slowly.    Continue oral hydration plan - clear liquids (pedialyte, sports drinks, etc)  Await infectious stool studies  She will check in tomorrow. If stool studies negative and not improving can consider trial of Entocort.  She agrees to the plan. All questions answered.    Luis Alberto Jacobs MD

## 2024-03-03 NOTE — ED PROVIDER NOTES
ED Provider Note  [facility]      History     Chief Complaint   Patient presents with    Diarrhea    Rectal Bleeding    Fever     HPI  Arleen Owusu is a 28 year old female who presents emergency department with watery diarrhea, fever to 101  F, nausea, palpitations and feeling like she is dehydrated all since yesterday morning at 6 AM.  Patient reports that prior to that several hours earlier she had an episode of rectal bleeding that has resolved.  She does have a history of Crohn's, most recently underwent colonoscopy on 2/19 which showed no evidence of active Crohn's inflammation, did have a small polyp removed.  She reports that she was traveling recently to [place], arrived back in the [facility] 4 days ago.  She does not know of any sick contacts that she had.  She denies vomiting but states that with anything that she takes and by mouth, she becomes nauseated and then has to defecate soon thereafter.  Patient reports that her last menstrual period was in approximately December, after that she received a Depo shot and has not menstruated since.  She reports she last took Tylenol 500 mg around 8 PM.  Of note, she has been in touch with her GI team, was here earlier today undergoing some lab testing including stool studies.  Most of these results are not actually available, her CBC from earlier today was reassuring and unrevealing.    Past Medical History  No past medical history on file.  Past Surgical History:   Procedure Laterality Date    COLONOSCOPY N/A 2/14/2022    Procedure: COLONOSCOPY, WITH  BIOPSY;  Surgeon: Luis Alberto Jacobs MD;  Location: [facility] [place]    COLONOSCOPY N/A 4/3/2023    Procedure: COLONOSCOPY, WITH BIOPSY;  Surgeon: Luis Alberto Jacobs MD;  Location: [facility] [place] [facility] OR    COLONOSCOPY N/A 2/19/2024    Procedure: COLONOSCOPY, WITH POLYPECTOMY AND BIOPSY, WITH CLIP;  Surgeon: Luis Alberto Jacobs MD;  Location: [facility] [place] [facility] OR     ondansetron (ZOFRAN ODT) 4 MG ODT  "tab  buPROPion (WELLBUTRIN XL) 150 MG 24 hr tablet  clindamycin (CLEOCIN T) 1 % external lotion  cyanocobalamin (CYANOCOBALAMIN) 1000 MCG/ML injection  EPINEPHrine (ANY BX GENERIC EQUIV) 0.3 MG/0.3ML injection 2-pack  fluticasone (FLONASE) 50 MCG/ACT nasal spray  levonorgestrel (KYLEENA) 19.5 MG IUD  NEEDLES, ANY SIZE  sertraline (ZOLOFT) 100 MG tablet  Syringe/Needle, Disp, 20G X 1-1/2\" 3 ML MISC  ustekinumab (STELARA) 45 MG/0.5ML SOLN      Allergies   Allergen Reactions    Ibuprofen      Pt has an autoimmune disease it causes a negative colon reaction , can tolerate tylenol    Levonorgestrel-Ethinyl Estrad Headache, Hives and Itching    Seasonale Headache, Hives and Itching    Latex Rash     Family History  No family history on file.  Social History   Social History     Tobacco Use    Smoking status: Never    Smokeless tobacco: Never   Vaping Use    Vaping Use: Never used   Substance Use Topics    Alcohol use: Not Currently    Drug use: Never         A medically appropriate review of systems was performed with pertinent positives and negatives noted in the HPI, and all other systems negative.    Physical Exam   BP: 105/70  Pulse: (!) 127  Temp: 99.1  F (37.3  C)  Resp: 16  Height: 170.2 cm (5' 7\")  Weight: 74.8 kg (165 lb)  SpO2: 94 %  Physical Exam  Constitutional:       General: She is awake. She is in acute distress.      Appearance: Normal appearance. She is well-developed. She is not ill-appearing or toxic-appearing.   HENT:      Head: Atraumatic.      Mouth/Throat:      Pharynx: No oropharyngeal exudate.   Eyes:      General: No scleral icterus.     Pupils: Pupils are equal, round, and reactive to light.   Cardiovascular:      Rate and Rhythm: Regular rhythm. Tachycardia present.      Heart sounds: Normal heart sounds, S1 normal and S2 normal.   Pulmonary:      Effort: No respiratory distress.      Breath sounds: Normal breath sounds.   Abdominal:      General: Bowel sounds are normal.      Palpations: " Abdomen is soft.      Tenderness: There is generalized abdominal tenderness.      Comments: Diffuse minimal abdominal tenderness to palpation, no rebound or guarding.  No peritoneal signs.   Musculoskeletal:         General: No tenderness.   Skin:     General: Skin is warm.      Findings: No rash.   Neurological:      Mental Status: She is alert and oriented to person, place, and time.   Psychiatric:         Attention and Perception: Attention normal.         Mood and Affect: Mood normal.         Speech: Speech normal.         Behavior: Behavior normal. Behavior is cooperative.           ED Course, Procedures, & Data      Procedures               Results for orders placed or performed during the hospital encounter of 03/02/24   Comprehensive metabolic panel     Status: Abnormal   Result Value Ref Range    Sodium 136 135 - 145 mmol/L    Potassium 3.5 3.4 - 5.3 mmol/L    Carbon Dioxide (CO2) 21 (L) 22 - 29 mmol/L    Anion Gap 13 7 - 15 mmol/L    Urea Nitrogen 6.4 6.0 - 20.0 mg/dL    Creatinine 0.74 0.51 - 0.95 mg/dL    GFR Estimate >90 >60 mL/min/1.73m2    Calcium 9.0 8.6 - 10.0 mg/dL    Chloride 102 98 - 107 mmol/L    Glucose 110 (H) 70 - 99 mg/dL    Alkaline Phosphatase 98 40 - 150 U/L    AST 24 0 - 45 U/L    ALT 16 0 - 50 U/L    Protein Total 7.8 6.4 - 8.3 g/dL    Albumin 4.8 3.5 - 5.2 g/dL    Bilirubin Total 0.6 <=1.2 mg/dL   Lipase     Status: Normal   Result Value Ref Range    Lipase 20 13 - 60 U/L   CRP inflammation     Status: Abnormal   Result Value Ref Range    CRP Inflammation 73.50 (H) <5.00 mg/L   CBC with platelets and differential     Status: None   Result Value Ref Range    WBC Count 6.4 4.0 - 11.0 10e3/uL    RBC Count 4.92 3.80 - 5.20 10e6/uL    Hemoglobin 14.3 11.7 - 15.7 g/dL    Hematocrit 41.6 35.0 - 47.0 %    MCV 85 78 - 100 fL    MCH 29.1 26.5 - 33.0 pg    MCHC 34.4 31.5 - 36.5 g/dL    RDW 14.0 10.0 - 15.0 %    Platelet Count 237 150 - 450 10e3/uL    % Neutrophils 74 %    % Lymphocytes 15 %    %  Monocytes 9 %    % Eosinophils 1 %    % Basophils 0 %    % Immature Granulocytes 1 %    NRBCs per 100 WBC 0 <1 /100    Absolute Neutrophils 4.8 1.6 - 8.3 10e3/uL    Absolute Lymphocytes 0.9 0.8 - 5.3 10e3/uL    Absolute Monocytes 0.6 0.0 - 1.3 10e3/uL    Absolute Eosinophils 0.1 0.0 - 0.7 10e3/uL    Absolute Basophils 0.0 0.0 - 0.2 10e3/uL    Absolute Immature Granulocytes 0.1 <=0.4 10e3/uL    Absolute NRBCs 0.0 10e3/uL   CBC with platelets differential     Status: None    Narrative    The following orders were created for panel order CBC with platelets differential.  Procedure                               Abnormality         Status                     ---------                               -----------         ------                     CBC with platelets and d...[[id number]]                      Final result                 Please view results for these tests on the individual orders.     Medications   lactated ringers BOLUS 1,000 mL (0 mLs Intravenous Stopped 3/3/24 0047)     Labs Ordered and Resulted from Time of ED Arrival to Time of ED Departure   COMPREHENSIVE METABOLIC PANEL - Abnormal       Result Value    Sodium 136      Potassium 3.5      Carbon Dioxide (CO2) 21 (*)     Anion Gap 13      Urea Nitrogen 6.4      Creatinine 0.74      GFR Estimate >90      Calcium 9.0      Chloride 102      Glucose 110 (*)     Alkaline Phosphatase 98      AST 24      ALT 16      Protein Total 7.8      Albumin 4.8      Bilirubin Total 0.6     CRP INFLAMMATION - Abnormal    CRP Inflammation 73.50 (*)    LIPASE - Normal    Lipase 20     CBC WITH PLATELETS AND DIFFERENTIAL    WBC Count 6.4      RBC Count 4.92      Hemoglobin 14.3      Hematocrit 41.6      MCV 85      MCH 29.1      MCHC 34.4      RDW 14.0      Platelet Count 237      % Neutrophils 74      % Lymphocytes 15      % Monocytes 9      % Eosinophils 1      % Basophils 0      % Immature Granulocytes 1      NRBCs per 100 WBC 0      Absolute Neutrophils 4.8      Absolute  Lymphocytes 0.9      Absolute Monocytes 0.6      Absolute Eosinophils 0.1      Absolute Basophils 0.0      Absolute Immature Granulocytes 0.1      Absolute NRBCs 0.0       No orders to display          Critical care was not performed.     Medical Decision Making  The patient's presentation was of moderate complexity (a chronic illness mild to moderate exacerbation, progression, or side effect of treatment).    The patient's evaluation involved:  review of external note(s) from 2 sources (gastro MD, nursing notes)  review of 3+ test result(s) ordered prior to this encounter (chem, cbc, CRP, colonscopy)  ordering and/or review of 3+ test(s) in this encounter (see separate area of note for details)    The patient's management necessitated moderate risk (prescription drug management including medications given in the ED).    Assessment & Plan    Arleen Owusu is a 28 year old female who presents emergency department with watery diarrhea, fever to 101  F, nausea, palpitations and feeling like she is dehydrated all since yesterday morning at 6 AM. Indeed, on exam pt appears dehydrated with tachycardia noted. Afebrile here. Screening labs sent including CRP, will hydrate with IVF and give Zofran.     I have reviewed the nursing notes. I have reviewed the findings, diagnosis, plan and need for follow up with the patient.    Labs revealing for CRP >70 with otherwise reassuring screening labs, no electrolyte derangements. Tachycardia resolved with IVF. Nausea improved with zofran, passed PO challenge with crackers and PO fluids. Suspect viral gastroenteritis but some concern for the start of a Crohn's flare. Feels well after IVF and zofran. Paged GI but not response x 2 given middle of the night hour. Discussed with patient and will send a message to her GI MD, Dr. Jacobs with plan for close follow up as an outpt. Will Rx zofran for symptomatic treatment. Opted not to escalate paging further given the late hour and pt's  ability to actually follow up closely with GI. Strict return precautions given, ok to discharge.     Discharge Medication List as of 3/3/2024  1:51 AM        START taking these medications    Details   ondansetron (ZOFRAN ODT) 4 MG ODT tab Take 1 tablet (4 mg) by mouth every 8 hours as needed for nausea, Disp-10 tablet, R-0, Local Print             Final diagnoses:   Dehydration   Diarrhea, unspecified type       Rey Alston MD PhD  [facility] EMERGENCY DEPARTMENT  3/2/2024     Rey Alston MD  03/03/24 4092

## 2024-03-03 NOTE — ED TRIAGE NOTES
Pt arrived from home via pov w/ c/c of diarrhea, blood in diarrhea, nausea, fever that all started yesterday. Pt has hx of crohns and got blood work and stool samples done today w/ the hospital.

## 2024-03-04 ENCOUNTER — TELEPHONE (OUTPATIENT)
Dept: GASTROENTEROLOGY | Facility: CLINIC | Age: 29
End: 2024-03-04
Payer: COMMERCIAL

## 2024-03-04 NOTE — TELEPHONE ENCOUNTER
Called patient for symptom check. Enteric panel positive for Rotavirus. Plan to proceed with supportive cares. Patient reports she is staying more adequately hydrated now than over the weekend. Thursday through Saturday she was having diarrhea about twice an hour. Yesterday and today her stools have greatly improved; now having about two episodes of diarrhea per day.    Patient inquired about whether it would be appropriate to take Imodium. Per Dr. Jacobs, preferable to refrain as symptoms are improving. If absolutely necessary, patient should take no more than two pills per day.

## 2024-03-05 LAB — CALPROTECTIN STL-MCNT: 97.7 MG/KG (ref 0–49.9)

## 2024-03-29 ENCOUNTER — ALLIED HEALTH/NURSE VISIT (OUTPATIENT)
Dept: GASTROENTEROLOGY | Facility: CLINIC | Age: 29
End: 2024-03-29
Payer: COMMERCIAL

## 2024-03-29 DIAGNOSIS — E53.8 VITAMIN B12 DEFICIENCY (NON ANEMIC): Primary | ICD-10-CM

## 2024-03-29 PROCEDURE — 96372 THER/PROPH/DIAG INJ SC/IM: CPT | Performed by: INTERNAL MEDICINE

## 2024-03-29 PROCEDURE — 99207 PR NO BILLABLE SERVICE THIS VISIT: CPT

## 2024-03-29 RX ADMIN — CYANOCOBALAMIN 1000 MCG: 1000 INJECTION, SOLUTION INTRAMUSCULAR; SUBCUTANEOUS at 11:25

## 2024-03-29 NOTE — NURSING NOTE
Arleen Owusu comes into clinic today at the request of Luis Alberto Jacobs Ordering Provider for Med Injection only Cyanocobalamin B12. (1000MCG/ML)     Injection was given in the right deltoid.     This service provided today was under the supervising provider of the day Elise Costa PA-C who was available if needed.        Debbie Go

## 2024-04-10 ENCOUNTER — VIRTUAL VISIT (OUTPATIENT)
Dept: GASTROENTEROLOGY | Facility: CLINIC | Age: 29
End: 2024-04-10
Payer: COMMERCIAL

## 2024-04-10 ENCOUNTER — VIRTUAL VISIT (OUTPATIENT)
Dept: GASTROENTEROLOGY | Facility: CLINIC | Age: 29
End: 2024-04-10
Attending: PHYSICIAN ASSISTANT
Payer: COMMERCIAL

## 2024-04-10 VITALS — WEIGHT: 165 LBS | BODY MASS INDEX: 25.01 KG/M2 | HEIGHT: 68 IN

## 2024-04-10 DIAGNOSIS — K50.10 CROHN'S DISEASE OF COLON WITHOUT COMPLICATION (H): Primary | ICD-10-CM

## 2024-04-10 DIAGNOSIS — K50.10 CROHN'S DISEASE OF COLON WITHOUT COMPLICATION (H): ICD-10-CM

## 2024-04-10 PROCEDURE — 99214 OFFICE O/P EST MOD 30 MIN: CPT | Mod: 95 | Performed by: PHYSICIAN ASSISTANT

## 2024-04-10 ASSESSMENT — PAIN SCALES - GENERAL: PAINLEVEL: NO PAIN (0)

## 2024-04-10 NOTE — PROGRESS NOTES
Virtual Visit Details    Type of service:  Video Visit     Originating Location (pt. Location): Home    Distant Location (provider location):  Off-site  Platform used for Video Visit: [facility]    IBD CLINIC VISIT    CC/REFERRING MD:  Luis Alberto Jacobs    REASON FOR CONSULTATION: follow up CD    ASSESSMENT/PLAN:    Crohn's colitis -   Current medication: stelara every 8 week  Current clinical disease activity: HBI 0   Last endoscopic disease activity: Colonoscopy 2/2024 SES = 0    Recent gastroenteritis episode with rotavirus, but recovered well. From an IBD standpoint, doing well on Stelara.  Fecal calpro also normal. Will proceed with colonoscopy next month as scheduled.    Joint pains/swelling - L > R MCPs and PIPs - X ray and labs unremarkable.  Symptoms totally resolved. Not clear if it was related to IBD arthropathy or a transient virus or something else. Keep scheduled appt with rheumatology    Constipation miralax PRN    Prior history of recurrent c diff - no recent issues    B12 deficiency - will work to arrange B12 shots monthly    IBD HISTORY  Age at diagnosis: 22/23 (2019)  Extent of disease: right sided colitis  Disease phenotype: inflammatory  Melly-anal disease: none  Prior IBD surgeries: none  Prior IBD Medications:  Prednisone taper 2/2020  Entocort  Apriso    DRUG MONITORING  TPMT enzyme activity: 24 (WNL)    6-TGN/6-MMPN levels: NA    Biologic concentration: NA    DISEASE ASSESSMENT  Labs  Recent Labs   Lab Test 03/02/24  2335 03/02/24  1636 01/02/24  1611 10/02/23  0748 08/26/22  1318 02/04/22  1149   CRP  --   --   --   --   --  0.3   SED  --   --  10 8   < > 7   HGB 14.3   < > 13.1 12.4   < > 12.5    < > = values in this interval not displayed.     Endoscopy:   -Colonoscopy 2/14/22 - CDES - 1. Mild erythema at appendiceal orifice. Otherwise totally normal colon  -Last endoscopic disease activity: Colonoscopy 4/2023 - CDES - 5. Alphous ulcers, erythema and loss of vasculature cecum and  proximal ascending  Enterography: MRE normal 3/3/22  Fecal filiberto >120 prior to Stelara start, now < 5 10/2023  C diff: negative 10/30/22  -July 2020, Oct 2020, Dec 2020 - then did 6 weeks vanco and none since    sIBDQ:   IBDQ Score Date IBDQ - Total Score  (Higher score better)   8/22/2023   9:48 AM 60   11/21/2022  10:29 PM 56   5/19/2022   1:57 PM 61   1/25/2022  12:44 PM 61       IBD Health Care Maintenance:    Per MTM referral:    When you are having a flare, you can schedule acetaminophen. Take acetaminophen 1000 mg every 6 hours while awake (max 3 times/day).  Arleen will consider the following vaccines:  Annual flu shot  Updated (7213-8419 Formula) COVID-19   Shingles vaccine (Shingrix, 2-dose series)   Hepatitis B 3-dose series   Annual Derm check  I've also placed a referral for Health Psychology.   Consider starting a calcium supplement. I recommend calcium 600 mg/vitamin D 400 units 1 tablet by mouth once daily with food. If this is tolerated after 1-2 weeks you can increase to 1 tablet by mouth twice daily with food.    Depression Screening:  -- Over the last month, have you felt down, depressed, or hopeless? no  -- Over the last month, have you felt little interest or pleasure doing things? no    Misc:  -- Avoid tobacco use  -- Avoid NSAIDs as there is potentially a 25% chance of causing an IBD flare    Return to clinic in 6 months    Thank you for this consultation.  It was a pleasure to participate in the care of this patient; please contact us with any further questions.      This note was created with voice recognition software, and while reviewed for accuracy, typos may remain.     Lan Trent PA-C  Division of Gastroenterology, Hepatology and Nutrition  [facility]     HPI:   Currently, here for follow up.    She had an episode in March after traveling to [place] developed nausea and urgency to have a BM. CRP was 70. +rotavirus. Resolved with supportive cares. She has returned to her  baseline stool pattern.  No blood in the stool. Occasional constipation, prn miralax. No EIM at this time.    She is tolerating Stelara well. A little fatigued for a day or two after but that resolves.    ROS:    No fevers or chills  No weight loss  No blurry vision, double vision or change in vision  No sore throat  No lymphadenopathy  No headache, paraesthesias, or weakness in a limb  No shortness of breath or wheezing  No chest pain or pressure  No arthralgias or myalgias  No rashes or skin changes  No odynophagia or dysphagia  No BRBPR, hematochezia, melena  No dysuria, frequency or urgency  No hot/cold intolerance or polyria  No anxiety or depression    Extra intestinal manifestations of IBD:  No uveitis/episcleritis  No aphthous ulcers   No arthritis   No erythema nodosum/pyoderma gangrenosum.     PERTINENT PAST MEDICAL HISTORY:  No past medical history on file.    PREVIOUS SURGERIES:  Past Surgical History:   Procedure Laterality Date    COLONOSCOPY N/A 2/14/2022    Procedure: COLONOSCOPY, WITH  BIOPSY;  Surgeon: Luis Alberto Jacobs MD;  Location: [facility] OR    COLONOSCOPY N/A 4/3/2023    Procedure: COLONOSCOPY, WITH BIOPSY;  Surgeon: Luis Alberto Jacobs MD;  Location: [facility] OR    COLONOSCOPY N/A 2/19/2024    Procedure: COLONOSCOPY, WITH POLYPECTOMY AND BIOPSY, WITH CLIP;  Surgeon: Luis Alberto Jacobs MD;  Location: [facility] OR       PREVIOUS ENDOSCOPY:  Results for orders placed or performed during the hospital encounter of 02/19/24   COLONOSCOPY   Result Value Ref Range    COLONOSCOPY       [facility]  [address] [place] [address]., [place] [address] [phone]     Endoscopy Department  _______________________________________________________________________________  Patient Name: Arleen Owusu              Procedure Date: 2/19/2024 3:17 PM  MRN: [id number]                       Account Number: [id number]  YOB: 1995             Admit Type: Outpatient  Age: 28                                Room: [facility] PROCEDURE [facility]OM 02  Gender: Female                        Note Status: Finalized  Attending MD: YANCY SCHMITZ MD,   Total Sedation Time:   _______________________________________________________________________________     Procedure:             Colonoscopy  Indications:           Disease activity assessment of Crohn's disease of the                          colon, Assess therapeutic response to therapy of                          Crohn's disease of the colon  Providers:             YANCY SCHMITZ MD, Cheri Arroyo, RN,                           Hannah Kothari, CRNA  Referring MD:          JOANNA OHARA  Requesting Provider:   JOANNA OHARA  Medicines:             Monitored Anesthesia Care  Complications:         No immediate complications.  _______________________________________________________________________________  Procedure:             Pre-Anesthesia Assessment:                         - See EPIC H and P note                         After obtaining informed consent, the colonoscope was                          passed under direct vision. Throughout the procedure,                          the patient's blood pressure, pulse, and oxygen                          saturations were monitored continuously. The                          Colonoscope was introduced through the anus and                          advanced to the terminal ileum, with identification of                          the appendiceal orifice and IC valve. The colonoscopy                          was performed without difficulty. The patie nt                          tolerated the procedure well. The quality of the bowel                          preparation was evaluated using the BBPS ([place] Bowel                          Preparation Scale) with scores of: Right Colon = 3,                          Transverse Colon = 3 and Left Colon = 3 (entire mucosa                          seen well with no  residual staining, small fragments                          of stool or opaque liquid). The total BBPS score                          equals 9.                                                                                   Findings:       Skin tags were found on perianal exam.       The Simple Endoscopic Score for Crohn's Disease was determined based on        the endoscopic appearance of the mucosa in the following segments:       - Ileum: Findings include no ulcers present, no ulcerated surfaces, no        affected surfaces and no narrowings. Segment score: 0.       - Right Colon: Findings include no ulcers present, no ulcer ated        surfaces, no affected surfaces and no narrowings. Segment score: 0.       - Transverse Colon: Findings include no ulcers present, no ulcerated        surfaces, no affected surfaces and no narrowings. Segment score: 0.       - Left Colon: Findings include no ulcers present, no ulcerated surfaces,        no affected surfaces and no narrowings. Segment score: 0.       - Rectum: Findings include no ulcers present, no ulcerated surfaces, no        affected surfaces and no narrowings. Segment score: 0.       - Total SES-CD aggregate score: 0. Biopsies were taken with a cold        forceps for histology (right colon, left colon and rectum and placed in        separate jars).       A 5 mm polyp was found in the cecum adjacent to the appendiceal orifice.        The polyp was flat. The polyp was removed with a cold snare. Resection        and retrieval were complete. To prevent bleeding after the polypectomy,        one hemostatic clip was successfully placed (MR conditional). Clip         : Jenn Rykert. There was no bleeding during, or at the end, of        the procedure.                                                                                   Impression:            - Perianal skin tags found on perianal exam.                         - Simple Endoscopic Score for Crohn's  Disease: 0,                          mucosal inflammatory changes secondary to Crohn's                          disease, in remission. Biopsied.                         - One 5 mm polyp in the cecum adjacent to the                          appendiceal orifice, removed with a cold snare.                          Resected and retrieved. Clip (MR conditional) was                          placed.                         - Due to small caliber rectal vault no retroflexion in                          the rectrum was performed. Right sided colon                          retroflexion was performed.                         She has endoscopic healing on her current therapies.  Recommendatio n:        - Discharge patient to home (with escort).                         - Resume previous diet.                         - Continue present medications.                         - Await pathology results.                         - Repeat colonoscopy in 3 years for surveillance based                          on pathology results.                         - Return to GI clinic as previously scheduled.                                                                                     Electronically Signed by: Dr. Luis Alberto Schmitz  ___________________________  LUIS ALBERTO SCHMITZ MD  2/19/2024 4:20:28 PM  I was physically present for the entire viewing portion of the exam.  __________________________  Signature of teaching physician  LUIS ALBERTO SCHMITZ MD  Number of Addenda: 0    Note Initiated On: 2/19/2024 3:17 PM  Scope In: 3:34:46 PM  Scope Out: 3:55:35 PM     ]    ALLERGIES:     Allergies   Allergen Reactions    Ibuprofen      Pt has an autoimmune disease it causes a negative colon reaction , can tolerate tylenol    Levonorgestrel-Ethinyl Estrad Headache, Hives and Itching    Seasonale Headache, Hives and Itching    Latex Rash       PERTINENT MEDICATIONS:    Current Outpatient Medications:     buPROPion (WELLBUTRIN XL) 150 MG 24 hr  "tablet, Take 1 tablet (150 mg) by mouth every morning, Disp: 90 tablet, Rfl: 1    clindamycin (CLEOCIN T) 1 % external lotion, , Disp: , Rfl:     cyanocobalamin (CYANOCOBALAMIN) 1000 MCG/ML injection, Inject 1 mL (1,000 mcg) into the muscle every 30 days, Disp: 1 mL, Rfl: 11    EPINEPHrine (ANY BX GENERIC EQUIV) 0.3 MG/0.3ML injection 2-pack, , Disp: , Rfl:     fluticasone (FLONASE) 50 MCG/ACT nasal spray, Spray 1-2 sprays into both nostrils 2 times daily as needed, Disp: , Rfl:     NEEDLES, ANY SIZE, Please use 27 gauge 1 inch needle to inject subcutaneous stelara as directed every 8 weeks., Disp: 10 each, Rfl: 1    sertraline (ZOLOFT) 100 MG tablet, Take 1 tablet (100 mg) by mouth 2 times daily, Disp: 180 tablet, Rfl: 0    Syringe/Needle, Disp, 20G X 1-1/2\" 3 ML MISC, Use as directed with Stelara every 8 weeks., Disp: 10 each, Rfl: 1    ustekinumab (STELARA) 45 MG/0.5ML SOLN, Inject 90mg (1mL) subcutaneously every 8 weeks, Disp: 1 mL, Rfl: 2    levonorgestrel (KYLEENA) 19.5 MG IUD, 1 each (19.5 mg) by Intrauterine route once, Disp: , Rfl:     Current Facility-Administered Medications:     cyanocobalamin injection 1,000 mcg, 1,000 mcg, Intramuscular, Q30 Days, Luis Alberto Jacobs MD, 1,000 mcg at 03/29/24 1125    medroxyPROGESTERone (DEPO-PROVERA) injection 150 mg, 150 mg, Intramuscular, Q90 Days, Kaya Matthew APRN CNM, 150 mg at 01/12/24 1541    SOCIAL HISTORY:  Social History     Socioeconomic History    Marital status: Single     Spouse name: Not on file    Number of children: Not on file    Years of education: Not on file    Highest education level: Not on file   Occupational History    Not on file   Tobacco Use    Smoking status: Never    Smokeless tobacco: Never   Vaping Use    Vaping status: Never Used   Substance and Sexual Activity    Alcohol use: Not Currently    Drug use: Never    Sexual activity: Not on file   Other Topics Concern    Not on file   Social History Narrative    Not on file " "    Social Determinants of Health     Financial Resource Strain: Not on file   Food Insecurity: Not on file   Transportation Needs: Not on file   Physical Activity: Not on file   Stress: Not on file   Social Connections: Not on file   Interpersonal Safety: Not on file   Housing Stability: Not on file       FAMILY HISTORY:  No family history on file.    Past/family/social history reviewed and no changes    PHYSICAL EXAMINATION:  Constitutional: aaox3, cooperative, pleasant, not dyspneic/diaphoretic, no acute distress  Vitals reviewed: Ht 1.727 m (5' 8\")   Wt 74.8 kg (165 lb)   BMI 25.09 kg/m    Wt:   Wt Readings from Last 2 Encounters:   04/10/24 74.8 kg (165 lb)   03/02/24 74.8 kg (165 lb)      Eyes: Sclera anicteric/injected  Ears/nose/mouth/throat: Normal oropharynx without ulcers or exudate, mucus membranes moist, hearing intact  Neck: supple, thyroid normal size  CV: No edema  Respiratory: Unlabored breathing  Lymph: No axillary, submandibular, supraclavicular or inguinal lymphadenopathy  Abd:  Nondistended, +bs, no hepatosplenomegaly, nontender, no peritoneal signs  Skin: warm, perfused, no jaundice  Psych: Normal affect  MSK: Normal gait      PERTINENT STUDIES:  Most recent CBC:  Recent Labs   Lab Test 03/02/24 2335 03/02/24  1636   WBC 6.4 5.5   HGB 14.3 13.5   HCT 41.6 39.3    231     Most recent hepatic panel:  Recent Labs   Lab Test 03/02/24  2335 03/02/24  1636   ALT 16 19   AST 24 24     Most recent creatinine:  Recent Labs   Lab Test 03/02/24  2335 01/02/24  1611   CR 0.74 0.66                 "

## 2024-04-10 NOTE — PATIENT INSTRUCTIONS
It was a pleasure taking care of you today.  I've included a brief summary of our discussion and care plan from today's visit below.  Please review this information with your primary care provider.  ______________________________________________________________________    My recommendations are summarized as follows:    -- Continue stelara every 8 weeks  -- Labs every 3 months   -- Next endoscopic assessment: 3 years  -- Patient with IBD we recommend supplementation vitamin D 1000 units daily and calcium 500 mg twice daily.  -- No NSAIDs (ibuprofen, or anything containing ibuprofen)    For additional resources about inflammatory bowel disease visit http://www.crohnscolitisfoundation.org/    To learn more about Diet and Nutrition in the setting of IBD, check out some of these resources:  https://www.crohnscolitisfoundation.org/diet-and-nutrition/what-should-i-eat  https://www.nimbal.org/ (mSCD)  https://ntforibd.org/ (SCD, mSCD, Autoimmune protocol, IBD-AID, CDED diets with inclusion/exclusion charts)  https://BellaDati.org/ (mediterranean diet)  https://www.[facility].edu/nutrition/ibd/ibdaid/ (IBD-AID)           Return to GI Clinic in 6 months to review your progress.    ______________________________________________________________________    How do I schedule labs, imaging studies, or procedures that were ordered in clinic today?     Labs: To schedule lab appointment at the [facility], use my chart or call [phone]. If you have a [place] lab closer to home where you are regularly seen you can give them a call.     Procedures: If a colonoscopy, upper endoscopy, breath test, esophageal manometry, or pH impedence was ordered today, our endoscopy team will call you to schedule this. If you have not heard from our endoscopy team within a week, please call [phone] to schedule.     Imaging Studies: If you were scheduled for a CT scan, X-ray, MRI, ultrasound, HIDA scan or other imaging study,  please call [phone] to have this scheduled.     Referral: If a referral to another specialty was ordered, expect a phone call or follow instructions above. If you have not heard from anyone regarding your referral in a week, please call our clinic to check the status.     Who do I call with any questions after my visit?  Please be in touch if there are any further questions that arise following today's visit.  There are multiple ways to contact your gastroenterology care team.      During business hours, you may reach a Gastroenterology nurse at [phone]    To schedule or reschedule an appointment, please call [phone].     You can always send a secure message through [employer].  [employer] messages are answered by your nurse or doctor typically within 24 hours.  Please allow extra time on weekends and holidays.      For urgent/emergent questions after business hours, you may reach the on-call GI Fellow by contacting the [facility]  at [phone].     How will I get the results of any tests ordered?    You will receive all of your results.  If you have signed up for [employer] Plan[employer], any tests ordered at your visit will be available to you after your physician reviews them.  Typically this takes 1-2 weeks.  If there are urgent results that require a change in your care plan, your physician or nurse will call you to discuss the next steps.      What is Image [employer]cher?  Image Searcher is a secure way for you to access all of your healthcare records from the [facility].  It is a web based computer program, so you can sign on to it from any location.  It also allows you to send secure messages to your care team.  I recommend signing up for Image Searcher access if you have not already done so and are comfortable with using a computer.         Sincerely,    Lan Trent PA-C  [facility]  Division of Gastroenterology

## 2024-04-10 NOTE — Clinical Note
4/10/2024         RE: Arleen Owusu  [address] [place] [address]        Dear Colleague,    Thank you for referring your patient, Arleen Owusu, to the [facility]. Please see a copy of my visit note below.    Medication Therapy Management (MTM) Encounter    ASSESSMENT:                            Medication Adherence/Access: {adherencechoices:627456}    Crohn's Disease:  ***    Renew standing labs    PLAN:                            Arleen will consider the following vaccines:  Shingles vaccine (Shingrix 2-dose series)- [facility]  Hepatitis B 3-dose series- [facility]   Reminder to schedule a routine skin check. you can call [phone] to schedule an appointment.  I've placed a referral to Health Psychology. If you don't hear from a  in 2-3 business days, you can call [phone] to schedule an appointment.    Follow-up: {followuptest2:687096}    SUBJECTIVE/OBJECTIVE:                          Arleen Owusu is a 28 year old female { :886924} for {mtmvisit:187839}     Reason for visit: ***.    Allergies/ADRs: Reviewed in chart  Past Medical History: {1/2/3/4/5:609126}  Tobacco: She reports that she has never smoked. She has never used smokeless tobacco.  Alcohol: {ALCOHOL CONSUMPTION HX:803043}  {Social and Goals:090833}    Medication Adherence/Access: {fumedadherence:561942}    Crohn's Disease:   Stelara 90 mg every 8 weeks (vials, latex allergy)    Keeps track in calendar. Denies injection site reactions. Week following feels fatigue.     Last provider visit: 4/10/24 Miley  Next provider visit: 11/14/2024 Reynaldo  Last labs completed: 3/2/24  Lab frequency: every 3 months   - standing labs   Next labs due: around 6/2/24     Last IBD Health Maintenance Review: 10/16/2023  PDC: 94%    Prior authorization status: 2 vials/56 days approved through 5/4/2024  Original start date: Stelara IV infusion 6/8/2023  Last dose: 3/14/2024  Next  "dose:    IBD Health Maintenance    Vaccinations:  All patients on biologics should avoid live vaccines unless specifically indicated.    -- Influenza (every year)- last 10/2023  -- TdaP (every 10 years)  -- Pneumococcal Pneumonia    Prevnar-13: not on file   Pneumovax-23: not on file   Prevnar-20: 5/2023  -- COVID-19 3/2021, 4/2021, 12/2021, 10/2022, 10/2023 (updated)  -    One time confirmation of immunity or serologies:  -- Hepatitis A (serologies or immunizations)- 2/2008, 2/2007  -- Hepatitis B (serologies or immunizations) not immune by serology 2022  -- Varicella/Zoster               Varicella- not on file ([facility] notes: \"Immunity: Varicella (chicken pox) 7/21/2006\"              Zoster- not on file  -- MMR- 8/2000, 2/1997  -- HPV (all aged 18-26) 2/2008, 10/2007, 8/2007  -- Meningococcal meningitis (all patients at risk for meningitis)-- MenACWY: Menveo (7/2/2013, 8/1/2007), Menactra (8/1/2007)    Due to the immunosuppression in this patient, I would not advise administration of live vaccines such as varicella/VZV, intranasal influenza, MMR, or yellow fever vaccine (if traveling).      Immunosuppressive Screening:  -- Hep B Surface Antibody not immune by serology 2/4/2022  -- Hep B Surface Antigen non-reactive 2/4/2022  -- Hep B Core Antibody negative 2/4/2022  -- Hep C Antibody non-reactive 5/23/2023  -- Yearly assessment of TB Negative 4/28/2023    Lab Results   Component Value Date    AUSAB Negative 02/04/2022    HEPBANG Nonreactive 02/04/2022    HBCAB Negative 02/04/2022    HCVAB Nonreactive 05/23/2023    TBRES Negative 04/28/2023       Bone mineral density screening   -- Recommend all patients supplement with calcium and vitamin D      Cancer Screening:  Colon cancer screening:  Per Lan Trent PA-C 8/22/23: \"Given right sided colits, recommend patient undergo regular dysplasia surveillance   Next dysplasia screening is recommended 2027.\"    Cervical cancer " "screening: Annual due to immunosupression- last 5/23/23    Skin cancer screening: Annual visual exam of skin by dermatologist since patient is immunocompromised- [facility] 1/2024    Depression Screening:    PHQ-2 Score:         8/22/2023     9:42 AM 4/27/2023     6:50 AM   PHQ-2 ( 1999 [employer])   Q1: Little interest or pleasure in doing things 1 1   Q2: Feeling down, depressed or hopeless 0 1   PHQ-2 Score 1 2       Research:  Are you interested in being contacted about enrollment in clinical research studies? {Yes and No:095808}    Would you like to receive a quarterly newsletter on research via email. {YES/NO:765934}      Misc:  -- Avoid tobacco use  -- Avoid NSAIDs as there is potentially a 25% chance of causing an IBD flare              Today's Vitals: There were no vitals taken for this visit.  ----------------  {ORLANDO?:745253}    I spent {mtm total time 3:481162} with this patient today. { :520736}. A copy of the visit note was provided to the patient's provider(s).    A summary of these recommendations {GIVEN/NOT GIVEN:472520}.    ***    Telemedicine Visit Details  Type of service:  {telemedvisitmtm:581733::\"Telephone visit\"}  Start Time: {video/phone visit start time:152948}  End Time: {video/phone visit end time:152948}     Medication Therapy Recommendations  No medication therapy recommendations to display       Medication Therapy Management (MTM) Encounter    ASSESSMENT:                            Medication Adherence/Access: No issues identified    Crohn's Disease:  Arleen would benefit from continued treatment with Stelara 90 mg every 8 weeks (vials, latex allergy). They are up to date on routine maintenance labs. They are up to date on annual tuberculosis screening. No access issues for their advanced therapy are present. They are indicated for a few vaccinations which were recommended to them.  Reminders for routine cancer screening were provided. Reminder to schedule an appointment with " health psychology was provided.    PLAN:                            Arleen will consider the following vaccines:  Shingles vaccine (Shingrix 2-dose series)- [facility] [place]  Hepatitis B 3-dose series- [facility]   Reminder to schedule a routine skin check. you can call [phone] to schedule an appointment.  Reminder to schedule an appointment with Health Psychology. You can call [phone] to schedule an appointment.    Follow-up: 10/10/2024 at 9:00 AM    SUBJECTIVE/OBJECTIVE:                          Arleen Owusu is a 28 year old female contacted via secure video for a follow-up visit.       Reason for visit: Stelara + IBD health maintenance.    Allergies/ADRs: Reviewed in chart  Past Medical History: Reviewed in chart  Tobacco: She reports that she has never smoked. She has never used smokeless tobacco.  Alcohol: none      Medication Adherence/Access: no issues reported    Crohn's Disease:   Stelara 90 mg every 8 weeks (vials, latex allergy)    Keeps track in calendar. Denies injection site reactions. Week following feels fatigue.     Last provider visit: 4/10/24 Miley  Next provider visit: 11/14/2024 Reynaldo  Last labs completed: 3/2/24  Lab frequency: every 3 months   - standing labs   Next labs due: around 6/2/24     Last IBD Health Maintenance Review: 10/16/2023  PDC: 94%    Prior authorization status: 2 vials/56 days approved through 5/4/2024  Original start date: Stelara IV infusion 6/8/2023  Last dose: 3/14/2024    IBD Health Maintenance    Vaccinations:  All patients on biologics should avoid live vaccines unless specifically indicated.    -- Influenza (every year)- last 10/2023  -- TdaP (every 10 years)  -- Pneumococcal Pneumonia    Prevnar-13: not on file   Pneumovax-23: not on file   Prevnar-20: 5/2023  -- COVID-19 3/2021, 4/2021, 12/2021, 10/2022, 10/2023 (updated)    One time confirmation of immunity or serologies:  -- Hepatitis A (serologies or immunizations)- 2/2008, 2/2007  --  "Hepatitis B (serologies or immunizations) not immune by serology 2022  -- Varicella/Zoster               Varicella- not on file ([facility] notes: \"Immunity: Varicella (chicken pox) 7/21/2006\"              Zoster- not on file  -- MMR- 8/2000, 2/1997  -- HPV (all aged 18-26) 2/2008, 10/2007, 8/2007  -- Meningococcal meningitis (all patients at risk for meningitis)-- MenACWY: Menveo (7/2/2013, 8/1/2007), Menactra (8/1/2007)    Due to the immunosuppression in this patient, I would not advise administration of live vaccines such as varicella/VZV, intranasal influenza, MMR, or yellow fever vaccine (if traveling).      Immunosuppressive Screening:  -- Hep B Surface Antibody not immune by serology 2/4/2022  -- Hep B Surface Antigen non-reactive 2/4/2022  -- Hep B Core Antibody negative 2/4/2022  -- Hep C Antibody non-reactive 5/23/2023  -- Yearly assessment of TB Negative 4/28/2023    Lab Results   Component Value Date    AUSAB Negative 02/04/2022    HEPBANG Nonreactive 02/04/2022    HBCAB Negative 02/04/2022    HCVAB Nonreactive 05/23/2023    TBRES Negative 04/28/2023       Bone mineral density screening   -- Recommend all patients supplement with calcium and vitamin D      Cancer Screening:  Colon cancer screening:  Per Lan Trent PA-C 8/22/23: \"Given right sided colits, recommend patient undergo regular dysplasia surveillance   Next dysplasia screening is recommended 2027.\"    Cervical cancer screening: Annual due to immunosupression- last 5/23/23    Skin cancer screening: Annual visual exam of skin by dermatologist since patient is immunocompromised- [facility] 1/2024    Depression Screening:    PHQ-2 Score:         8/22/2023     9:42 AM 4/27/2023     6:50 AM   PHQ-2 ( 1999 Pfizer)   Q1: Little interest or pleasure in doing things 1 1   Q2: Feeling down, depressed or hopeless 0 1   PHQ-2 Score 1 2       Misc:  -- Avoid tobacco use  -- Avoid NSAIDs as there is " potentially a 25% chance of causing an IBD flare      ----------------      I spent 30 minutes with this patient today. All changes were made via collaborative practice agreement with Luis Alberto Jacobs MD. A copy of the visit note was provided to the patient's provider(s).    A summary of these recommendations was sent via [employer].    Gerald Sebastian PharmD, BCPS  [facility] Pharmacist   [facility]  Phone: [phone]    Telemedicine Visit Details  Type of service:  Video Conference via [employer]  Start Time:  2:00 PM  End Time:  2:30 PM     Medication Therapy Recommendations  No medication therapy recommendations to display         Again, thank you for allowing me to participate in the care of your patient.        Sincerely,        Gerald Sebastian RPH

## 2024-04-10 NOTE — PROGRESS NOTES
Medication Therapy Management (MTM) Encounter    ASSESSMENT:                            Medication Adherence/Access: No issues identified    Crohn's Disease:  Arleen would benefit from continued treatment with Stelara 90 mg every 8 weeks (vials, latex allergy). They are up to date on routine maintenance labs. They are up to date on annual tuberculosis screening. No access issues for their advanced therapy are present. They are indicated for a few vaccinations which were recommended to them.  Reminders for routine cancer screening were provided. Reminder to schedule an appointment with health psychology was provided.    PLAN:                            Arleen will consider the following vaccines:  Shingles vaccine (Shingrix 2-dose series)- [facility]  Hepatitis B 3-dose series- [facility]   Reminder to schedule a routine skin check. you can call [phone] to schedule an appointment.  Reminder to schedule an appointment with Health Psychology. You can call [phone] to schedule an appointment.    Follow-up: 10/10/2024 at 9:00 AM    SUBJECTIVE/OBJECTIVE:                          Arleen Owusu is a 28 year old female contacted via secure video for a follow-up visit.       Reason for visit: Stelara + IBD health maintenance.    Allergies/ADRs: Reviewed in chart  Past Medical History: Reviewed in chart  Tobacco: She reports that she has never smoked. She has never used smokeless tobacco.  Alcohol: none      Medication Adherence/Access: no issues reported    Crohn's Disease:   Stelara 90 mg every 8 weeks (vials, latex allergy)    Keeps track in calendar. Denies injection site reactions. Week following feels fatigued, but tolerable.     Last provider visit: 4/10/24 Miley  Next provider visit: 11/14/2024 Reynaldo  Last labs completed: 3/2/24  Lab frequency: every 3 months   - standing labs renewed with this encounter  Next labs due: around 6/2/24     Last IBD Health Maintenance Review: 10/16/2023  PDC:  "94%    Prior authorization status: 2 vials/56 days approved through 5/4/2024  Original start date: Stelara IV infusion 6/8/2023  Last dose: 3/14/2024    IBD Health Maintenance    Vaccinations:  All patients on biologics should avoid live vaccines unless specifically indicated.    -- Influenza (every year)- last 10/2023  -- TdaP (every 10 years)  -- Pneumococcal Pneumonia    Prevnar-13: not on file   Pneumovax-23: not on file   Prevnar-20: 5/2023  -- COVID-19 3/2021, 4/2021, 12/2021, 10/2022, 10/2023 (updated)    One time confirmation of immunity or serologies:  -- Hepatitis A (serologies or immunizations)- 2/2008, 2/2007  -- Hepatitis B (serologies or immunizations) not immune by serology 2022  -- Varicella/Zoster               Varicella- not on file ([facility] notes: \"Immunity: Varicella (chicken pox) 7/21/2006\"              Zoster- not on file  -- MMR- 8/2000, 2/1997  -- HPV (all aged 18-26) 2/2008, 10/2007, 8/2007  -- Meningococcal meningitis (all patients at risk for meningitis)-- MenACWY: Menveo (7/2/2013, 8/1/2007), Menactra (8/1/2007)    Due to the immunosuppression in this patient, I would not advise administration of live vaccines such as varicella/VZV, intranasal influenza, MMR, or yellow fever vaccine (if traveling).      Immunosuppressive Screening:  -- Hep B Surface Antibody not immune by serology 2/4/2022  -- Hep B Surface Antigen non-reactive 2/4/2022  -- Hep B Core Antibody negative 2/4/2022  -- Hep C Antibody non-reactive 5/23/2023  -- Yearly assessment of TB Negative 4/28/2023    Lab Results   Component Value Date    AUSAB Negative 02/04/2022    HEPBANG Nonreactive 02/04/2022    HBCAB Negative 02/04/2022    HCVAB Nonreactive 05/23/2023    TBRES Negative 04/28/2023       Bone mineral density screening   -- Recommend all patients supplement with calcium and vitamin D      Cancer Screening:  Colon cancer screening:  Per Lan Trent PA-C 8/22/23: \"Given right sided " "colits, recommend patient undergo regular dysplasia surveillance   Next dysplasia screening is recommended 2027.\"    Cervical cancer screening: Annual due to immunosupression- last 5/23/23    Skin cancer screening: Annual visual exam of skin by dermatologist since patient is immunocompromised- [facility] 1/2024    Depression Screening:    PHQ-2 Score:         8/22/2023     9:42 AM 4/27/2023     6:50 AM   PHQ-2 ( 1999 Pf[employer])   Q1: Little interest or pleasure in doing things 1 1   Q2: Feeling down, depressed or hopeless 0 1   PHQ-2 Score 1 2       Misc:  -- Avoid tobacco use  -- Avoid NSAIDs as there is potentially a 25% chance of causing an IBD flare      ----------------      I spent 30 minutes with this patient today. All changes were made via collaborative practice agreement with Luis Alberto Jacobs MD. A copy of the visit note was provided to the patient's provider(s).    A summary of these recommendations was sent via [employer].    Gerald Sebastian, PharmD, BCPS  MTM Pharmacist   [facility]  Phone: [phone]    Telemedicine Visit Details  Type of service:  Video Conference via [employer]  Start Time:  2:00 PM  End Time:  2:30 PM     Medication Therapy Recommendations  No medication therapy recommendations to display     "

## 2024-04-10 NOTE — NURSING NOTE
Is the patient currently in the state of [place]? YES    Visit mode:VIDEO    If the visit is dropped, the patient can be reconnected by: VIDEO VISIT: Text to cell phone:   Telephone Information:   Mobile [phone]       Will anyone else be joining the visit? NO  (If patient encounters technical issues they should call [phone] :[phone])    How would you like to obtain your AVS? MyChart    Are changes needed to the allergy or medication list? Pt stated no med changes    Are refills needed on medications prescribed by this physician? NO    Reason for visit: Video Visit (Recheck IBD)    Daysi NAIK

## 2024-04-10 NOTE — LETTER
4/10/2024         RE: Arleen Owusu  [address] [place] [address]        Dear Colleague,    Thank you for referring your patient, Arleen Owusu, to the [facility] [place]. Please see a copy of my visit note below.    IBD CLINIC VISIT    CC/REFERRING MD:  Luis Alberto Jacobs    REASON FOR CONSULTATION: follow up CD    ASSESSMENT/PLAN:    Crohn's colitis -   Current medication: stelara every 8 week  Current clinical disease activity: HBI 0   Last endoscopic disease activity: Colonoscopy 2/2024 SES = 0    Recent gastroenteritis episode with rotavirus, but recovered well. From an IBD standpoint, doing well on Stelara.  Fecal calpro also normal. Will proceed with colonoscopy next month as scheduled.    Joint pains/swelling - L > R MCPs and PIPs - X ray and labs unremarkable.  Symptoms totally resolved. Not clear if it was related to IBD arthropathy or a transient virus or something else. Keep scheduled appt with rheumatology    Constipation miralax PRN    Prior history of recurrent c diff - no recent issues    B12 deficiency - will work to arrange B12 shots monthly    IBD HISTORY  Age at diagnosis: 22/23 (2019)  Extent of disease: right sided colitis  Disease phenotype: inflammatory  Melly-anal disease: none  Prior IBD surgeries: none  Prior IBD Medications:  Prednisone taper 2/2020  Entocort  Apriso    DRUG MONITORING  TPMT enzyme activity: 24 (WNL)    6-TGN/6-MMPN levels: NA    Biologic concentration: NA    DISEASE ASSESSMENT  Labs  Recent Labs   Lab Test 03/02/24  2335 03/02/24  1636 01/02/24  1611 10/02/23  0748 08/26/22  1318 02/04/22  1149   CRP  --   --   --   --   --  0.3   SED  --   --  10 8   < > 7   HGB 14.3   < > 13.1 12.4   < > 12.5    < > = values in this interval not displayed.     Endoscopy:   -Colonoscopy 2/14/22 - [facility] - 1. Mild erythema at appendiceal orifice. Otherwise totally normal colon  -Last endoscopic disease activity: Colonoscopy 4/2023 - [facility] -  5. Alphous ulcers, erythema and loss of vasculature cecum and proximal ascending  Enterography: MRE normal 3/3/22  Fecal filiberto >120 prior to Stelara start, now < 5 10/2023  C diff: negative 10/30/22  -July 2020, Oct 2020, Dec 2020 - then did 6 weeks vanco and none since    sIBDQ:   IBDQ Score Date IBDQ - Total Score  (Higher score better)   8/22/2023   9:48 AM 60   11/21/2022  10:29 PM 56   5/19/2022   1:57 PM 61   1/25/2022  12:44 PM 61       IBD Health Care Maintenance:    Per MTM referral:    When you are having a flare, you can schedule acetaminophen. Take acetaminophen 1000 mg every 6 hours while awake (max 3 times/day).  Arleen will consider the following vaccines:  Annual flu shot  Updated (3607-7167 Formula) COVID-19   Shingles vaccine (Shingrix, 2-dose series)   Hepatitis B 3-dose series   Annual Derm check  I've also placed a referral for Health Psychology.   Consider starting a calcium supplement. I recommend calcium 600 mg/vitamin D 400 units 1 tablet by mouth once daily with food. If this is tolerated after 1-2 weeks you can increase to 1 tablet by mouth twice daily with food.    Depression Screening:  -- Over the last month, have you felt down, depressed, or hopeless? no  -- Over the last month, have you felt little interest or pleasure doing things? no    Misc:  -- Avoid tobacco use  -- Avoid NSAIDs as there is potentially a 25% chance of causing an IBD flare    Return to clinic in 6 months    Thank you for this consultation.  It was a pleasure to participate in the care of this patient; please contact us with any further questions.      This note was created with voice recognition software, and while reviewed for accuracy, typos may remain.     Lan Trent PA-C  Division of Gastroenterology, Hepatology and Nutrition  [facility]     HPI:   Currently, here for follow up.    She had an episode in March after traveling to [place] developed nausea and urgency to have a BM. CRP was 70.  +rotavirus. Resolved with supportive cares. She has returned to her baseline stool pattern.  No blood in the stool. Occasional constipation, prn miralax. No EIM at this time.    She is tolerating Stelara well. A little fatigued for a day or two after but that resolves.    ROS:    No fevers or chills  No weight loss  No blurry vision, double vision or change in vision  No sore throat  No lymphadenopathy  No headache, paraesthesias, or weakness in a limb  No shortness of breath or wheezing  No chest pain or pressure  No arthralgias or myalgias  No rashes or skin changes  No odynophagia or dysphagia  No BRBPR, hematochezia, melena  No dysuria, frequency or urgency  No hot/cold intolerance or polyria  No anxiety or depression    Extra intestinal manifestations of IBD:  No uveitis/episcleritis  No aphthous ulcers   No arthritis   No erythema nodosum/pyoderma gangrenosum.     PERTINENT PAST MEDICAL HISTORY:  No past medical history on file.    PREVIOUS SURGERIES:  Past Surgical History:   Procedure Laterality Date    COLONOSCOPY N/A 2/14/2022    Procedure: COLONOSCOPY, WITH  BIOPSY;  Surgeon: Luis Alberto Jacobs MD;  Location: [facility] OR    COLONOSCOPY N/A 4/3/2023    Procedure: COLONOSCOPY, WITH BIOPSY;  Surgeon: Luis Alberto Jacobs MD;  Location: [facility] OR    COLONOSCOPY N/A 2/19/2024    Procedure: COLONOSCOPY, WITH POLYPECTOMY AND BIOPSY, WITH CLIP;  Surgeon: Luis Alberto Jacobs MD;  Location: [facility] OR       PREVIOUS ENDOSCOPY:  Results for orders placed or performed during the hospital encounter of 02/19/24   COLONOSCOPY   Result Value Ref Range    COLONOSCOPY       [facility]  [address] [place] [id number] [phone]     Endoscopy Department  _______________________________________________________________________________  Patient Name: Arleen Owusu              Procedure Date: 2/19/2024 3:17 PM  MRN: [id number]                       Account Number: [id number]  Date of Birth:  1995             Admit Type: Outpatient  Age: 28                               Room: [facility] PROCEDURE ROOM 02  Gender: Female                        Note Status: Finalized  Attending MD: YANCY SCHMITZ MD,   Total Sedation Time:   _______________________________________________________________________________     Procedure:             Colonoscopy  Indications:           Disease activity assessment of Crohn's disease of the                          colon, Assess therapeutic response to therapy of                          Crohn's disease of the colon  Providers:             YANCY SCHMITZ MD, Cheri Arroyo RN,                           Hannah Kothari, CRNA  Referring MD:          JOANNA OHARA  Requesting Provider:   JOANNA OHARA  Medicines:             Monitored Anesthesia Care  Complications:         No immediate complications.  _______________________________________________________________________________  Procedure:             Pre-Anesthesia Assessment:                         - See EPIC H and P note                         After obtaining informed consent, the colonoscope was                          passed under direct vision. Throughout the procedure,                          the patient's blood pressure, pulse, and oxygen                          saturations were monitored continuously. The                          Colonoscope was introduced through the anus and                          advanced to the terminal ileum, with identification of                          the appendiceal orifice and IC valve. The colonoscopy                          was performed without difficulty. The patie nt                          tolerated the procedure well. The quality of the bowel                          preparation was evaluated using the BBPS ([place] Bowel                          Preparation Scale) with scores of: Right Colon = 3,                          Transverse Colon =  3 and Left Colon = 3 (entire mucosa                          seen well with no residual staining, small fragments                          of stool or opaque liquid). The total BBPS score                          equals 9.                                                                                   Findings:       Skin tags were found on perianal exam.       The Simple Endoscopic Score for Crohn's Disease was determined based on        the endoscopic appearance of the mucosa in the following segments:       - Ileum: Findings include no ulcers present, no ulcerated surfaces, no        affected surfaces and no narrowings. Segment score: 0.       - Right Colon: Findings include no ulcers present, no ulcer ated        surfaces, no affected surfaces and no narrowings. Segment score: 0.       - Transverse Colon: Findings include no ulcers present, no ulcerated        surfaces, no affected surfaces and no narrowings. Segment score: 0.       - Left Colon: Findings include no ulcers present, no ulcerated surfaces,        no affected surfaces and no narrowings. Segment score: 0.       - Rectum: Findings include no ulcers present, no ulcerated surfaces, no        affected surfaces and no narrowings. Segment score: 0.       - Total SES-CD aggregate score: 0. Biopsies were taken with a cold        forceps for histology (right colon, left colon and rectum and placed in        separate jars).       A 5 mm polyp was found in the cecum adjacent to the appendiceal orifice.        The polyp was flat. The polyp was removed with a cold snare. Resection        and retrieval were complete. To prevent bleeding after the polypectomy,        one hemostatic clip was successfully placed (MR conditional). Clip         : Oris4. There was no bleeding during, or at the end, of        the procedure.                                                                                   Impression:            - Perianal skin tags found  on perianal exam.                         - Simple Endoscopic Score for Crohn's Disease: 0,                          mucosal inflammatory changes secondary to Crohn's                          disease, in remission. Biopsied.                         - One 5 mm polyp in the cecum adjacent to the                          appendiceal orifice, removed with a cold snare.                          Resected and retrieved. Clip (MR conditional) was                          placed.                         - Due to small caliber rectal vault no retroflexion in                          the rectrum was performed. Right sided colon                          retroflexion was performed.                         She has endoscopic healing on her current therapies.  Recommendatio n:        - Discharge patient to home (with escort).                         - Resume previous diet.                         - Continue present medications.                         - Await pathology results.                         - Repeat colonoscopy in 3 years for surveillance based                          on pathology results.                         - Return to GI clinic as previously scheduled.                                                                                     Electronically Signed by: Dr. Luis Alberto Schmitz  ___________________________  LUIS ALBERTO SCHMITZ MD  2/19/2024 4:20:28 PM  I was physically present for the entire viewing portion of the exam.  __________________________  Signature of teaching physician  LUIS ALBERTO SCHMITZ MD  Number of Addenda: 0    Note Initiated On: 2/19/2024 3:17 PM  Scope In: 3:34:46 PM  Scope Out: 3:55:35 PM     ]    ALLERGIES:     Allergies   Allergen Reactions    Ibuprofen      Pt has an autoimmune disease it causes a negative colon reaction , can tolerate tylenol    Levonorgestrel-Ethinyl Estrad Headache, Hives and Itching    Seasonale Headache, Hives and Itching    Latex Rash       PERTINENT  "MEDICATIONS:    Current Outpatient Medications:     buPROPion (WELLBUTRIN XL) 150 MG 24 hr tablet, Take 1 tablet (150 mg) by mouth every morning, Disp: 90 tablet, Rfl: 1    clindamycin (CLEOCIN T) 1 % external lotion, , Disp: , Rfl:     cyanocobalamin (CYANOCOBALAMIN) 1000 MCG/ML injection, Inject 1 mL (1,000 mcg) into the muscle every 30 days, Disp: 1 mL, Rfl: 11    EPINEPHrine (ANY BX GENERIC EQUIV) 0.3 MG/0.3ML injection 2-pack, , Disp: , Rfl:     fluticasone (FLONASE) 50 MCG/ACT nasal spray, Spray 1-2 sprays into both nostrils 2 times daily as needed, Disp: , Rfl:     NEEDLES, ANY SIZE, Please use 27 gauge 1 inch needle to inject subcutaneous stelara as directed every 8 weeks., Disp: 10 each, Rfl: 1    sertraline (ZOLOFT) 100 MG tablet, Take 1 tablet (100 mg) by mouth 2 times daily, Disp: 180 tablet, Rfl: 0    Syringe/Needle, Disp, 20G X 1-1/2\" 3 ML MISC, Use as directed with Stelara every 8 weeks., Disp: 10 each, Rfl: 1    ustekinumab (STELARA) 45 MG/0.5ML SOLN, Inject 90mg (1mL) subcutaneously every 8 weeks, Disp: 1 mL, Rfl: 2    levonorgestrel (KYLEENA) 19.5 MG IUD, 1 each (19.5 mg) by Intrauterine route once, Disp: , Rfl:     Current Facility-Administered Medications:     cyanocobalamin injection 1,000 mcg, 1,000 mcg, Intramuscular, Q30 Days, Luis Alberto Jacobs MD, 1,000 mcg at 03/29/24 1125    medroxyPROGESTERone (DEPO-PROVERA) injection 150 mg, 150 mg, Intramuscular, Q90 Days, Kaya Matthew APRN CNM, 150 mg at 01/12/24 1541    SOCIAL HISTORY:  Social History     Socioeconomic History    Marital status: Single     Spouse name: Not on file    Number of children: Not on file    Years of education: Not on file    Highest education level: Not on file   Occupational History    Not on file   Tobacco Use    Smoking status: Never    Smokeless tobacco: Never   Vaping Use    Vaping status: Never Used   Substance and Sexual Activity    Alcohol use: Not Currently    Drug use: Never    Sexual activity: Not on " "file   Other Topics Concern    Not on file   Social History Narrative    Not on file     Social Determinants of Health     Financial Resource Strain: Not on file   Food Insecurity: Not on file   Transportation Needs: Not on file   Physical Activity: Not on file   Stress: Not on file   Social Connections: Not on file   Interpersonal Safety: Not on file   Housing Stability: Not on file       FAMILY HISTORY:  No family history on file.    Past/family/social history reviewed and no changes    PHYSICAL EXAMINATION:  Constitutional: aaox3, cooperative, pleasant, not dyspneic/diaphoretic, no acute distress  Vitals reviewed: Ht 1.727 m (5' 8\")   Wt 74.8 kg (165 lb)   BMI 25.09 kg/m    Wt:   Wt Readings from Last 2 Encounters:   04/10/24 74.8 kg (165 lb)   03/02/24 74.8 kg (165 lb)      Eyes: Sclera anicteric/injected  Ears/nose/mouth/throat: Normal oropharynx without ulcers or exudate, mucus membranes moist, hearing intact  Neck: supple, thyroid normal size  CV: No edema  Respiratory: Unlabored breathing  Lymph: No axillary, submandibular, supraclavicular or inguinal lymphadenopathy  Abd:  Nondistended, +bs, no hepatosplenomegaly, nontender, no peritoneal signs  Skin: warm, perfused, no jaundice  Psych: Normal affect  MSK: Normal gait      PERTINENT STUDIES:  Most recent CBC:  Recent Labs   Lab Test 03/02/24  2335 03/02/24  1636   WBC 6.4 5.5   HGB 14.3 13.5   HCT 41.6 39.3    231     Most recent hepatic panel:  Recent Labs   Lab Test 03/02/24  2335 03/02/24  1636   ALT 16 19   AST 24 24     Most recent creatinine:  Recent Labs   Lab Test 03/02/24  2335 01/02/24  1611   CR 0.74 0.66           Again, thank you for allowing me to participate in the care of your patient.      Sincerely,    Lan Trent PA-C  "

## 2024-04-21 RX ORDER — ZOSTER VACCINE RECOMBINANT, ADJUVANTED 50 MCG/0.5
1 KIT INTRAMUSCULAR ONCE
Qty: 0.5 ML | Refills: 1 | Status: SHIPPED | OUTPATIENT
Start: 2024-04-21 | End: 2024-04-21

## 2024-04-21 RX ORDER — HEPATITIS B VACCINE (RECOMBINANT) 10 UG/ML
0.5 INJECTION, SUSPENSION INTRAMUSCULAR; SUBCUTANEOUS ONCE
Qty: 1 ML | Refills: 2 | Status: SHIPPED | OUTPATIENT
Start: 2024-04-21 | End: 2024-04-21

## 2024-04-22 ENCOUNTER — TELEPHONE (OUTPATIENT)
Dept: GASTROENTEROLOGY | Facility: CLINIC | Age: 29
End: 2024-04-22
Payer: COMMERCIAL

## 2024-04-22 NOTE — PATIENT INSTRUCTIONS
"Recommendations from today's MTM visit:                                                      Arleen will consider the following vaccines:  Shingles vaccine (Shingrix 2-dose series)- [facility]  Hepatitis B 3-dose series- [facility]   Reminder to schedule a routine skin check. you can call [phone] to schedule an appointment.  Reminder to schedule an appointment with Health Psychology. You can call [phone] to schedule an appointment.    Follow-up: 10/10/2024 at 9:00 AM    It was great speaking with you today.  I value your experience and would be very thankful for your time in providing feedback in our clinic survey. In the next few days, you may receive an email or text message from [employer] with a link to a survey related to your  clinical pharmacist.\"     To schedule another MTM appointment, please call the clinic directly or you may call the MTM scheduling line at [phone] or toll-free at [phone].     My Clinical Pharmacist's contact information:                                                      Please feel free to contact me with any questions or concerns you have.      Gerald Sebastian, PharmD, BCPS  MTM Pharmacist   [facility]  Phone: [phone]   "

## 2024-04-22 NOTE — TELEPHONE ENCOUNTER
PA Initiation    Medication: STELARA 45 MG/0.5ML SC [facility]  Insurance Company: [employer] - Phone [phone] Fax [phone]  Pharmacy Filling the Rx:    Filling Pharmacy Phone:    Filling Pharmacy Fax:    Start Date: 4/22/2024    Key: [id number]

## 2024-04-23 ENCOUNTER — PATIENT OUTREACH (OUTPATIENT)
Dept: CARE COORDINATION | Facility: CLINIC | Age: 29
End: 2024-04-23
Payer: COMMERCIAL

## 2024-04-23 NOTE — TELEPHONE ENCOUNTER
Prior Authorization Approval    Medication: STELARA 45 MG/0.5ML SC [facility]  Authorization Effective Date: 3/24/2024  Authorization Expiration Date: 6/4/2024  Approved Dose/Quantity: 1/56  Reference #: Key: [id number]   Insurance Company: [employer] Phone [phone] Fax [phone]  Expected CoPay: $    CoPay Card Available:      Financial Assistance Needed:    Which Pharmacy is filling the prescription: [facility] - [place], [place] - [address]  Pharmacy Notified:    Patient Notified:

## 2024-04-24 ENCOUNTER — TELEPHONE (OUTPATIENT)
Dept: GASTROENTEROLOGY | Facility: CLINIC | Age: 29
End: 2024-04-24
Payer: COMMERCIAL

## 2024-04-24 NOTE — TELEPHONE ENCOUNTER
Left Voicemail (1st Attempt) for the patient to call back and schedule the following:    Appointment type: Return IBD   Provider: Lan Tretn   Return date: 10/10/24  Specialty phone number: [phone]  Additional appointment(s) needed:   Additonal Notes:

## 2024-05-02 ENCOUNTER — TELEPHONE (OUTPATIENT)
Dept: GASTROENTEROLOGY | Facility: CLINIC | Age: 29
End: 2024-05-02
Payer: COMMERCIAL

## 2024-05-02 NOTE — TELEPHONE ENCOUNTER
Patient LVM with writer at 2:02pm on 5/2 requesting to schedule vitamin B-12 injection.     Writer returned call and left voicemail at 3:20pm on 5/2 with call back number to help patient schedule.     Debbie Go

## 2024-05-07 ENCOUNTER — PATIENT OUTREACH (OUTPATIENT)
Dept: CARE COORDINATION | Facility: CLINIC | Age: 29
End: 2024-05-07
Payer: COMMERCIAL

## 2024-05-08 ENCOUNTER — ALLIED HEALTH/NURSE VISIT (OUTPATIENT)
Dept: OBGYN | Facility: CLINIC | Age: 29
End: 2024-05-08
Attending: MIDWIFE
Payer: COMMERCIAL

## 2024-05-08 DIAGNOSIS — Z30.9 CONTRACEPTION MANAGEMENT: Primary | ICD-10-CM

## 2024-05-08 PROCEDURE — 250N000011 HC RX IP 250 OP 636: Mod: JZ | Performed by: MIDWIFE

## 2024-05-08 PROCEDURE — 81025 URINE PREGNANCY TEST: CPT

## 2024-05-08 PROCEDURE — 96372 THER/PROPH/DIAG INJ SC/IM: CPT | Performed by: MIDWIFE

## 2024-05-08 RX ADMIN — MEDROXYPROGESTERONE ACETATE 150 MG: 150 INJECTION, SUSPENSION INTRAMUSCULAR at 11:45

## 2024-05-08 NOTE — NURSING NOTE
Chief Complaint   Patient presents with    Allied Health Visit     Depo-provera injection          Clinic Administered Medication Documentation      Depo Provera Documentation    Depo-Provera Standing Order inclusion/exclusion criteria reviewed.     Is this the initial or subsequent dose of Depo Provera? Subsequent dose - patient is not within the acceptable window of time (11-15 weeks) for subsequent injection. Pregnancy test is indicated. Pregnancy test result: negative       Patient meets: inclusion criteria     Is there an active order (written within the past 365 days, with administrations remaining, not ) in the chart? Yes.     Prior to injection, verified patient identity using patient's name and date of birth. Medication was administered. Please see MAR and medication order for additional information.     Vial/Syringe: Single dose vial. Was entire vial of medication used? Yes    Patient instructed to remain in clinic for 15 minutes and report any adverse reaction to staff immediately.  NEXT INJECTION DUE: 24 - 24    Verified that the patient has refills remaining in their prescription.       Dee Chen LPN

## 2024-05-10 ENCOUNTER — ALLIED HEALTH/NURSE VISIT (OUTPATIENT)
Dept: GASTROENTEROLOGY | Facility: CLINIC | Age: 29
End: 2024-05-10
Payer: COMMERCIAL

## 2024-05-10 DIAGNOSIS — E53.8 VITAMIN B12 DEFICIENCY (NON ANEMIC): ICD-10-CM

## 2024-05-10 PROCEDURE — 96372 THER/PROPH/DIAG INJ SC/IM: CPT | Performed by: INTERNAL MEDICINE

## 2024-05-10 PROCEDURE — 99207 PR NO BILLABLE SERVICE THIS VISIT: CPT

## 2024-05-10 RX ORDER — CYANOCOBALAMIN 1000 UG/ML
1000 INJECTION, SOLUTION INTRAMUSCULAR; SUBCUTANEOUS
Status: ACTIVE | OUTPATIENT
Start: 2024-05-10 | End: 2025-05-05

## 2024-05-10 RX ORDER — CYANOCOBALAMIN 1000 UG/ML
1000 INJECTION, SOLUTION INTRAMUSCULAR; SUBCUTANEOUS
Status: CANCELLED | OUTPATIENT
Start: 2024-05-10 | End: 2025-05-05

## 2024-05-10 RX ADMIN — CYANOCOBALAMIN 1000 MCG: 1000 INJECTION, SOLUTION INTRAMUSCULAR; SUBCUTANEOUS at 08:48

## 2024-05-10 NOTE — NURSING NOTE
Arleen Owusu comes into clinic today at the request of Luis Alberto Jacobs Ordering Provider for Med Injection only Cyanocobalamin B12. (1000MCG/ML)     Injection was given in the left deltoid by María CANAS MA.     This service provided today was under the supervising provider of the day Elise Costa PA-C who was available if needed.        Debbie Go

## 2024-05-20 ENCOUNTER — TELEPHONE (OUTPATIENT)
Dept: GASTROENTEROLOGY | Facility: CLINIC | Age: 29
End: 2024-05-20
Payer: COMMERCIAL

## 2024-05-20 NOTE — TELEPHONE ENCOUNTER
PA Initiation    Medication: STELARA 45 MG/0.5ML SC [facility]  Insurance Company: [employer] - Phone [phone] Fax [phone]  Pharmacy Filling the Rx: [facility] - [place], [place] - [facility] [address]  Filling Pharmacy Phone:    Filling Pharmacy Fax:    Start Date: 5/20/2024  [id number]

## 2024-05-30 NOTE — TELEPHONE ENCOUNTER
Prior Authorization Approval    Medication: STELARA 45 MG/0.5ML SC [facility]  Authorization Effective Date: 5/30/2024  Authorization Expiration Date: 5/23/2025  Approved Dose/Quantity: 1/56  Reference #: [id number]   Insurance Company: [employer] Phone [phone] Fax [phone]  Expected CoPay: $    CoPay Card Available:      Financial Assistance Needed:    Which Pharmacy is filling the prescription: [facility] - [place], [place] - [address]  Pharmacy Notified:    Patient Notified:

## 2024-06-19 ENCOUNTER — ALLIED HEALTH/NURSE VISIT (OUTPATIENT)
Dept: GASTROENTEROLOGY | Facility: CLINIC | Age: 29
End: 2024-06-19
Payer: COMMERCIAL

## 2024-06-19 DIAGNOSIS — E53.8 VITAMIN B12 DEFICIENCY (NON ANEMIC): Primary | ICD-10-CM

## 2024-06-19 PROCEDURE — 99207 PR NO CHARGE NURSE ONLY: CPT

## 2024-06-19 PROCEDURE — 96372 THER/PROPH/DIAG INJ SC/IM: CPT | Performed by: INTERNAL MEDICINE

## 2024-06-19 RX ADMIN — CYANOCOBALAMIN 1000 MCG: 1000 INJECTION, SOLUTION INTRAMUSCULAR; SUBCUTANEOUS at 13:14

## 2024-06-19 NOTE — NURSING NOTE
Arleen Owusu comes into clinic today at the request of Luis Alberto Jacobs Ordering Provider for Med Injection only Cyanocobalamin B12. (1000MCG/ML)     Injection was given in the Right deltoid by Patience CANAS CMA     This service provided today was under the supervising provider of the day Elise Saldaña PA-C who was available if needed.         Patience Cabrales

## 2024-07-05 ENCOUNTER — MYC MEDICAL ADVICE (OUTPATIENT)
Dept: GASTROENTEROLOGY | Facility: CLINIC | Age: 29
End: 2024-07-05
Payer: COMMERCIAL

## 2024-07-15 ENCOUNTER — MYC MEDICAL ADVICE (OUTPATIENT)
Dept: GASTROENTEROLOGY | Facility: CLINIC | Age: 29
End: 2024-07-15

## 2024-07-15 ENCOUNTER — VIRTUAL VISIT (OUTPATIENT)
Dept: FAMILY MEDICINE | Facility: CLINIC | Age: 29
End: 2024-07-15
Payer: COMMERCIAL

## 2024-07-15 DIAGNOSIS — K50.10 CROHN'S DISEASE OF COLON WITHOUT COMPLICATION (H): Primary | ICD-10-CM

## 2024-07-15 DIAGNOSIS — F32.4 MAJOR DEPRESSIVE DISORDER WITH SINGLE EPISODE, IN PARTIAL REMISSION (H): ICD-10-CM

## 2024-07-15 PROCEDURE — 96127 BRIEF EMOTIONAL/BEHAV ASSMT: CPT | Mod: 95 | Performed by: NURSE PRACTITIONER

## 2024-07-15 PROCEDURE — G2211 COMPLEX E/M VISIT ADD ON: HCPCS | Mod: 95 | Performed by: NURSE PRACTITIONER

## 2024-07-15 PROCEDURE — 99213 OFFICE O/P EST LOW 20 MIN: CPT | Mod: 95 | Performed by: NURSE PRACTITIONER

## 2024-07-15 RX ORDER — SERTRALINE HYDROCHLORIDE 100 MG/1
100 TABLET, FILM COATED ORAL 2 TIMES DAILY
Qty: 180 TABLET | Refills: 0 | Status: SHIPPED | OUTPATIENT
Start: 2024-07-15

## 2024-07-15 ASSESSMENT — PATIENT HEALTH QUESTIONNAIRE - PHQ9
SUM OF ALL RESPONSES TO PHQ QUESTIONS 1-9: 6
10. IF YOU CHECKED OFF ANY PROBLEMS, HOW DIFFICULT HAVE THESE PROBLEMS MADE IT FOR YOU TO DO YOUR WORK, TAKE CARE OF THINGS AT HOME, OR GET ALONG WITH OTHER PEOPLE: SOMEWHAT DIFFICULT
SUM OF ALL RESPONSES TO PHQ QUESTIONS 1-9: 6

## 2024-07-15 NOTE — PROGRESS NOTES
"Arleen is a 28 year old who is being evaluated via a billable video visit.    How would you like to obtain your AVS? MyChart  If the video visit is dropped, the invitation should be resent by: Text to cell phone: [phone]  Will anyone else be joining your video visit? No      Assessment & Plan     Major depressive disorder with single episode, in partial remission (H24)  Overall well controlled.  Some increased symptoms with Crohn's exacerbation.  Could consider addition of buspar.  1 refill provided, needs to be seen for preventative exam for ongoing refills as advised by her former PCP who is no longer in the clinic.  - sertraline (ZOLOFT) 100 MG tablet; Take 1 tablet (100 mg) by mouth 2 times daily          BMI  Estimated body mass index is 25.09 kg/m  as calculated from the following:    Height as of 4/10/24: 1.727 m (5' 8\").    Weight as of 4/10/24: 74.8 kg (165 lb).   Weight management plan: Discussed healthy diet and exercise guidelines          Subjective   Arleen is a 28 year old, presenting for the following health issues:  Recheck Medication (Sertraline )      7/15/2024     4:01 PM   Additional Questions   Roomed by Daysi BONNER     History of Present Illness       Mental Health Follow-up:  Patient presents to follow-up on Depression.Patient's depression since last visit has been:  Medium  The patient is having other symptoms associated with depression.      Any significant life events: health concerns  Patient is not feeling anxious or having panic attacks.  Patient has no concerns about alcohol or drug use.    She eats 2-3 servings of fruits and vegetables daily.She consumes 1 sweetened beverage(s) daily.She exercises with enough effort to increase her heart rate 9 or less minutes per day.  She exercises with enough effort to increase her heart rate 3 or less days per week. She is missing 2 dose(s) of medications per week.  She is not taking prescribed medications regularly due to remembering to take.   "     Overall well controlled.  Crohn's contributes.  Anxiety worsens with exacerbation.              Objective           Vitals:  No vitals were obtained today due to virtual visit.    Physical Exam   GENERAL: alert and no distress  EYES: Eyes grossly normal to inspection.  No discharge or erythema, or obvious scleral/conjunctival abnormalities.  RESP: No audible wheeze, cough, or visible cyanosis.    SKIN: Visible skin clear. No significant rash, abnormal pigmentation or lesions.  NEURO: Cranial nerves grossly intact.  Mentation and speech appropriate for age.  PSYCH: Appropriate affect, tone, and pace of words          Video-Visit Details    Type of service:  Video Visit   Originating Location (pt. Location): Home    Distant Location (provider location):  Off-site  Platform used for Video Visit: Nessa  Signed Electronically by: Kaila Marcelo DNP

## 2024-07-16 ENCOUNTER — LAB (OUTPATIENT)
Dept: LAB | Facility: CLINIC | Age: 29
End: 2024-07-16
Payer: COMMERCIAL

## 2024-07-16 DIAGNOSIS — K50.10 CROHN'S DISEASE OF COLON WITHOUT COMPLICATION (H): ICD-10-CM

## 2024-07-16 LAB
ADV 40+41 DNA STL QL NAA+NON-PROBE: NEGATIVE
ALBUMIN SERPL BCG-MCNC: 4.7 G/DL (ref 3.5–5.2)
ALP SERPL-CCNC: 97 U/L (ref 40–150)
ALT SERPL W P-5'-P-CCNC: 17 U/L (ref 0–50)
AST SERPL W P-5'-P-CCNC: 18 U/L (ref 0–45)
ASTRO TYP 1-8 RNA STL QL NAA+NON-PROBE: NEGATIVE
BASOPHILS # BLD AUTO: 0 10E3/UL (ref 0–0.2)
BASOPHILS NFR BLD AUTO: 0 %
BILIRUB DIRECT SERPL-MCNC: <0.2 MG/DL (ref 0–0.3)
BILIRUB SERPL-MCNC: 0.3 MG/DL
C CAYETANENSIS DNA STL QL NAA+NON-PROBE: NEGATIVE
C DIFF TOX B STL QL: NEGATIVE
CAMPYLOBACTER DNA SPEC NAA+PROBE: NEGATIVE
CRP SERPL-MCNC: <3 MG/L
CRYPTOSP DNA STL QL NAA+NON-PROBE: NEGATIVE
E COLI O157 DNA STL QL NAA+NON-PROBE: ABNORMAL
E HISTOLYT DNA STL QL NAA+NON-PROBE: NEGATIVE
EAEC ASTA GENE ISLT QL NAA+PROBE: NEGATIVE
EC STX1+STX2 GENES STL QL NAA+NON-PROBE: NEGATIVE
EOSINOPHIL # BLD AUTO: 0.1 10E3/UL (ref 0–0.7)
EOSINOPHIL NFR BLD AUTO: 1 %
EPEC EAE GENE STL QL NAA+NON-PROBE: NEGATIVE
ERYTHROCYTE [DISTWIDTH] IN BLOOD BY AUTOMATED COUNT: 13.7 % (ref 10–15)
ETEC LTA+ST1A+ST1B TOX ST NAA+NON-PROBE: NEGATIVE
G LAMBLIA DNA STL QL NAA+NON-PROBE: NEGATIVE
HCT VFR BLD AUTO: 40 % (ref 35–47)
HGB BLD-MCNC: 13.3 G/DL (ref 11.7–15.7)
IMM GRANULOCYTES # BLD: 0 10E3/UL
IMM GRANULOCYTES NFR BLD: 0 %
LYMPHOCYTES # BLD AUTO: 3.8 10E3/UL (ref 0.8–5.3)
LYMPHOCYTES NFR BLD AUTO: 46 %
MCH RBC QN AUTO: 28.2 PG (ref 26.5–33)
MCHC RBC AUTO-ENTMCNC: 33.3 G/DL (ref 31.5–36.5)
MCV RBC AUTO: 85 FL (ref 78–100)
MONOCYTES # BLD AUTO: 0.5 10E3/UL (ref 0–1.3)
MONOCYTES NFR BLD AUTO: 6 %
NEUTROPHILS # BLD AUTO: 3.9 10E3/UL (ref 1.6–8.3)
NEUTROPHILS NFR BLD AUTO: 47 %
NOROVIRUS GI+II RNA STL QL NAA+NON-PROBE: POSITIVE
P SHIGELLOIDES DNA STL QL NAA+NON-PROBE: NEGATIVE
PLATELET # BLD AUTO: 252 10E3/UL (ref 150–450)
PROT SERPL-MCNC: 7.1 G/DL (ref 6.4–8.3)
RBC # BLD AUTO: 4.71 10E6/UL (ref 3.8–5.2)
RVA RNA STL QL NAA+NON-PROBE: NEGATIVE
SALMONELLA SP RPOD STL QL NAA+PROBE: NEGATIVE
SAPO I+II+IV+V RNA STL QL NAA+NON-PROBE: NEGATIVE
SHIGELLA SP+EIEC IPAH ST NAA+NON-PROBE: NEGATIVE
V CHOLERAE DNA SPEC QL NAA+PROBE: NEGATIVE
VIBRIO DNA SPEC NAA+PROBE: NEGATIVE
WBC # BLD AUTO: 8.2 10E3/UL (ref 4–11)
Y ENTEROCOL DNA STL QL NAA+PROBE: NEGATIVE

## 2024-07-16 PROCEDURE — 85025 COMPLETE CBC W/AUTO DIFF WBC: CPT

## 2024-07-16 PROCEDURE — 83993 ASSAY FOR CALPROTECTIN FECAL: CPT

## 2024-07-16 PROCEDURE — 36415 COLL VENOUS BLD VENIPUNCTURE: CPT

## 2024-07-16 PROCEDURE — 80076 HEPATIC FUNCTION PANEL: CPT

## 2024-07-16 PROCEDURE — 87493 C DIFF AMPLIFIED PROBE: CPT | Mod: 59

## 2024-07-16 PROCEDURE — 86140 C-REACTIVE PROTEIN: CPT

## 2024-07-16 PROCEDURE — 87507 IADNA-DNA/RNA PROBE TQ 12-25: CPT

## 2024-07-17 LAB — CALPROTECTIN STL-MCNT: <5 MG/KG (ref 0–49.9)

## 2024-07-27 ENCOUNTER — HEALTH MAINTENANCE LETTER (OUTPATIENT)
Age: 29
End: 2024-07-27

## 2024-08-07 ENCOUNTER — ALLIED HEALTH/NURSE VISIT (OUTPATIENT)
Dept: GASTROENTEROLOGY | Facility: CLINIC | Age: 29
End: 2024-08-07
Payer: COMMERCIAL

## 2024-08-07 DIAGNOSIS — E53.8 VITAMIN B12 DEFICIENCY (NON ANEMIC): Primary | ICD-10-CM

## 2024-08-07 PROCEDURE — 96372 THER/PROPH/DIAG INJ SC/IM: CPT | Performed by: INTERNAL MEDICINE

## 2024-08-07 PROCEDURE — 99207 PR NO CHARGE NURSE ONLY: CPT

## 2024-08-07 RX ADMIN — CYANOCOBALAMIN 1000 MCG: 1000 INJECTION, SOLUTION INTRAMUSCULAR; SUBCUTANEOUS at 09:14

## 2024-08-07 NOTE — NURSING NOTE
Arleen Owusu  1995 comes into clinic today at the request of Dr. Luis Alberto Jacobs Ordering Provider for Med Injection only Cyanocobalamin B12. (1000MCG/ML)     Injection was given in the Right deltoid by Patience Cabrales MA     This service provided today was under the supervising provider of the day Elise Saldaña PA-C who was available if needed.    Patience Cabrales MA

## 2024-09-12 ENCOUNTER — TRANSFERRED RECORDS (OUTPATIENT)
Dept: HEALTH INFORMATION MANAGEMENT | Facility: CLINIC | Age: 29
End: 2024-09-12
Payer: COMMERCIAL

## 2024-10-02 DIAGNOSIS — K50.10 CROHN'S DISEASE OF COLON WITHOUT COMPLICATION (H): ICD-10-CM

## 2024-10-08 NOTE — TELEPHONE ENCOUNTER
ustekinumab (STELARA) 45 MG/0.5ML SOLN  Last Written Prescription Date:  4/21/2024  Last Fill Quantity: 1 ml,   # refills: 2  Last Office Visit : 4/10/2024  [facility]  Future Office visit:  11/14/2024  Routing ustekinumab (STELARA) 45 MG/0.5ML SOLN refill request to provider for review/approval because: Required ESR lab due - other required labs current done within the past 6 months    CBC RESULTS:   Recent Labs   Lab Test 07/16/24  1104   WBC 8.2   RBC 4.71   HGB 13.3   HCT 40.0   MCV 85   MCH 28.2   MCHC 33.3   RDW 13.7         Liver Function Studies -   Recent Labs   Lab Test 07/16/24  1104   PROTTOTAL 7.1   ALBUMIN 4.7   BILITOTAL 0.3   ALKPHOS 97   AST 18   ALT 17     CRP Inflammation   Date Value Ref Range Status   07/16/2024 <3.00 <5.00 mg/L Final     Erythrocyte Sedimentation Rate   Date Value Ref Range Status   01/02/2024 10 0 - 20 mm/hr Final        4/10/2024  [facility] [place]

## 2024-10-09 ENCOUNTER — TELEPHONE (OUTPATIENT)
Dept: PHARMACY | Facility: CLINIC | Age: 29
End: 2024-10-09
Payer: COMMERCIAL

## 2024-10-09 DIAGNOSIS — K50.10 CROHN'S DISEASE OF COLON WITHOUT COMPLICATION (H): Primary | ICD-10-CM

## 2024-10-09 RX ORDER — USTEKINUMAB 45 MG/.5ML
90 INJECTION, SOLUTION SUBCUTANEOUS
Qty: 1 ML | Refills: 0 | OUTPATIENT
Start: 2024-10-09

## 2024-10-09 NOTE — TELEPHONE ENCOUNTER
Refill sent in separate telephone encounter     Hugh Sebastian, PharmD, BCPS  MTM Pharmacist   [facility]  Phone: [phone]

## 2024-10-09 NOTE — TELEPHONE ENCOUNTER
Last provider visit: 4/10/2024 REA Trent  Next provider visit: 2024 MD Reynaldo  Last labs completed: 2024  Lab frequency: every 3 months   - standing labs available until 2025  Next labs due: 10/16/2024  Last TB screenin2023  PDC: 100%

## 2024-10-10 ENCOUNTER — VIRTUAL VISIT (OUTPATIENT)
Dept: PHARMACY | Facility: CLINIC | Age: 29
End: 2024-10-10
Attending: PHYSICIAN ASSISTANT
Payer: COMMERCIAL

## 2024-10-10 VITALS — HEIGHT: 68 IN | WEIGHT: 165 LBS | BODY MASS INDEX: 25.01 KG/M2

## 2024-10-10 DIAGNOSIS — M19.90 ARTHRITIS: ICD-10-CM

## 2024-10-10 DIAGNOSIS — K50.10 CROHN'S DISEASE OF COLON WITHOUT COMPLICATION (H): Primary | ICD-10-CM

## 2024-10-10 RX ORDER — PREDNISONE 5 MG/1
TABLET ORAL
COMMUNITY
Start: 2024-09-23

## 2024-10-10 RX ORDER — AZATHIOPRINE 50 MG/1
50 TABLET ORAL
COMMUNITY
Start: 2024-09-17

## 2024-10-10 RX ORDER — ZOSTER VACCINE RECOMBINANT, ADJUVANTED 50 MCG/0.5
1 KIT INTRAMUSCULAR ONCE
Qty: 1 EACH | Refills: 1 | Status: SHIPPED | OUTPATIENT
Start: 2024-10-10 | End: 2024-10-10

## 2024-10-10 ASSESSMENT — PAIN SCALES - GENERAL: PAINLEVEL: NO PAIN (0)

## 2024-10-10 NOTE — PROGRESS NOTES
Medication Therapy Management (MTM) Encounter    ASSESSMENT:                            Medication Adherence/Access: No issues identified.    Crohn's Disease:  Arleen would benefit from continued treatment with Stelara 90 mg every 8 weeks. They are up to date on routine maintenance labs. They are up to date on annual tuberculosis screening. No access issues for their advanced therapy are present. They are indicated for a few vaccinations which were recommended to them. Reminders for routine cancer screening were provided. Health maintenance was not comprehensively reviewed with this visit.    Arthritis: Tolerating azathioprine and prednisone. Recommend continued lab work and follow-up per Rheumatology. Too soon to assess efficacy of azathioprine.     PLAN:                            Continue medications as directed.  You are currently up to date on labs. Next due on 10/16 for standing labs ordered under Lan Trent PA-C and annual tuberculosis screening ordered by Luis Alberto Jacobs MD.   Arleen will consider the following vaccines:  Flu shot  COVID-19 vaccine   Shingles vaccine (Shingrix 2-dose series)- Prescription sent to [facility]  Hepatitis B 3-dose series- Prescription sent to [facility]   Reminder to schedule a routine skin check, you can call [phone] to schedule an appointment.    Follow-up: 4/7/2025 at 11:00 AM (telephone)    SUBJECTIVE/OBJECTIVE:                          Arleen Owusu is a 28 year old female seen for a follow-up visit.       [facility]    Allergies/ADRs: Reviewed in chart  Past Medical History: Reviewed in chart  Tobacco: She reports that she has never smoked. She has never used smokeless tobacco.  Alcohol: none      Medication Adherence/Access: no issues reported.    Crohn's Disease:   Stelara 90 mg every 8 weeks (vials, latex allergy)    Keeps track in calendar. Denies injection site reactions. Week following feels fatigued, but  "tolerable.     Specialty medication department: [facility]  Prior authorization status: stelara 45 mg/0.5 mL  approved through 2025  Original start date: Original start date: Stelara IV infusion 2023  Last dose: 2024  Next dose: 10/24/2024    Last provider visit: 4/10/2024 REA Trent  Next provider visit: 2024 MD Reynaldo  Last labs completed: 2024  Last Tb screenin2023  Lab frequency: every 3 months   - standing labs yes CRP, CBC with platelets & diff, hepatic  2025  Next labs due: 10/16/2024    Last IBD Health Maintenance Review: 4/10/2024  PDC: 100%    Vaccinations:  All patients on biologics should avoid live vaccines unless specifically indicated.    -- Influenza (every year)- last 10/2023  -- TdaP (every 10 years)  -- Pneumococcal Pneumonia               Prevnar-13: not on file              Pneumovax-23: not on file              Prevnar-20: 2023  -- COVID-19 3/2021, 2021, 2021, 10/2022, 10/2023 (updated)     One time confirmation of immunity or serologies:  -- Hepatitis A (serologies or immunizations)- 2008, 2007  -- Hepatitis B (serologies or immunizations) not immune by serology   -- Varicella/Zoster               Varicella- not on file ([facility] &  notes: \"Immunity: Varicella (chicken pox) 2006\"              Zoster- not on file  -- MMR- 2000, 1997  -- HPV (all aged 18-26) 2008, 10/2007, 2007  -- Meningococcal meningitis (all patients at risk for meningitis)-- MenACWY: Menveo (2013, 2007), Menactra (2007)     Due to the immunosuppression in this patient, I would not advise administration of live vaccines such as varicella/VZV, intranasal influenza, MMR, or yellow fever vaccine (if traveling).      Arthritis:   Azathioprine 100 mg once daily  Prednisone 10 mg once daily- for joint stiffness    Working with rheumatology with [facility] orthopedics. Originally started sulfasalazine around " 9/12/2024 but discontinued due to adverse effects (feet tingle, hand tingle).    Started azathioprine 9/17/2024.  Having lab work every 2 weeks. Next follow-up visit in 2 weeks.       ----------------      I spent 20 minutes with this patient today. All changes were made via collaborative practice agreement with Luis Alberto Jacobs MD. A copy of the visit note was provided to the patient's provider(s).    A summary of these recommendations was sent via YUPIQ.    Gerald Sebastian, PharmD, [facility]  [facility] Pharmacist   [facility]  Phone: [phone]    Telemedicine Visit Details  The patient's medications can be safely assessed via a telemedicine encounter.  Type of service:  Video Conference via Kamelio  Originating Location (pt. Location): Home    Distant Location (provider location):  Off-site  Start Time:  9:00 AM  End Time:  9:20 AM     Medication Therapy Recommendations  No medication therapy recommendations to display

## 2024-10-10 NOTE — PROGRESS NOTES
"Virtual Visit Details    Type of service:  Video Visit     Originating Location (pt. Location): {video visit patient location:146169::\"Home\"}  {PROVIDER LOCATION On-site should be selected for visits conducted from your clinic location or adjoining [facility] hospital, academic office, or other nearby [facility] building. Off-site should be selected for all other provider locations, including home:[address]}  Distant Location (provider location):  {virtual location provider:215970}  Platform used for Video Visit: {Virtual Visit Platforms:680319::\"[email or web address]\"}  "

## 2024-10-10 NOTE — PATIENT INSTRUCTIONS
"Recommendations from today's MTM visit:                                                      Continue medications as directed.  You are currently up to date on labs. Next due on 10/16 for standing labs ordered under Lan Trent PA-C and annual tuberculosis screening ordered by Luis Alberto Jacobs MD.   Arleen will consider the following vaccines:  Flu shot  COVID-19 vaccine   Shingles vaccine (Shingrix 2-dose series)- Prescription sent to [facility]  Hepatitis B 3-dose series- Prescription sent to [facility]   Reminder to schedule a routine skin check, you can call [phone] to schedule an appointment.    Follow-up: 4/7/2025 at 11:00 AM (telephone)    It was great speaking with you today.  I value your experience and would be very thankful for your time in providing feedback in our clinic survey. In the next few days, you may receive an email or text message from vozero with a link to a survey related to your  clinical pharmacist.\"     To schedule another MTM appointment, please call the clinic directly or you may call the MTM scheduling line at [phone] or toll-free at [phone].     My Clinical Pharmacist's contact information:                                                      Please feel free to contact me with any questions or concerns you have.      Alena MelchorD, BCPS  [facility] Pharmacist   [facility]  Phone: [phone]   "

## 2024-10-10 NOTE — NURSING NOTE
Current patient location: [address] [place] [address] AVE   [facility] [id number]    Is the patient currently in the state of [place]? YES    Visit mode:VIDEO    If the visit is dropped, the patient can be reconnected by: VIDEO VISIT: Text to cell phone:   Telephone Information:   Mobile [phone]       Will anyone else be joining the visit? NO  (If patient encounters technical issues they should call [phone]    Are changes needed to the allergy or medication list? Pt stated no changes to allergies and now taking Azathioprine 100mg daily and Prednisone  5mg (added from med reconcile)    Are refills needed on medications prescribed by this physician? Discuss with provider    Rooming Documentation:  Not applicable    Reason for visit: RECHECK    Sheridan Maza LPN

## 2024-10-11 ENCOUNTER — CARE COORDINATION (OUTPATIENT)
Dept: OBGYN | Facility: CLINIC | Age: 29
End: 2024-10-11
Payer: COMMERCIAL

## 2024-10-11 DIAGNOSIS — Z30.8 ENCOUNTER FOR OTHER CONTRACEPTIVE MANAGEMENT: Primary | ICD-10-CM

## 2024-10-11 RX ORDER — MEDROXYPROGESTERONE ACETATE 150 MG/ML
150 INJECTION, SUSPENSION INTRAMUSCULAR
Status: ACTIVE | OUTPATIENT
Start: 2024-10-11 | End: 2025-10-06

## 2024-10-15 ENCOUNTER — OFFICE VISIT (OUTPATIENT)
Dept: URGENT CARE | Facility: URGENT CARE | Age: 29
End: 2024-10-15
Payer: COMMERCIAL

## 2024-10-15 VITALS
HEART RATE: 74 BPM | SYSTOLIC BLOOD PRESSURE: 112 MMHG | OXYGEN SATURATION: 98 % | RESPIRATION RATE: 18 BRPM | TEMPERATURE: 98.8 F | DIASTOLIC BLOOD PRESSURE: 74 MMHG | WEIGHT: 167 LBS | BODY MASS INDEX: 25.39 KG/M2

## 2024-10-15 DIAGNOSIS — B96.89 BACTERIAL VAGINOSIS: ICD-10-CM

## 2024-10-15 DIAGNOSIS — N76.0 BACTERIAL VAGINOSIS: ICD-10-CM

## 2024-10-15 DIAGNOSIS — N30.00 ACUTE CYSTITIS WITHOUT HEMATURIA: Primary | ICD-10-CM

## 2024-10-15 DIAGNOSIS — R30.0 DYSURIA: ICD-10-CM

## 2024-10-15 LAB
ALBUMIN UR-MCNC: NEGATIVE MG/DL
APPEARANCE UR: CLEAR
BACTERIA #/AREA URNS HPF: ABNORMAL /HPF
BILIRUB UR QL STRIP: NEGATIVE
CLUE CELLS: PRESENT
COLOR UR AUTO: YELLOW
GLUCOSE UR STRIP-MCNC: NEGATIVE MG/DL
HGB UR QL STRIP: NEGATIVE
KETONES UR STRIP-MCNC: NEGATIVE MG/DL
LEUKOCYTE ESTERASE UR QL STRIP: ABNORMAL
NITRATE UR QL: NEGATIVE
PH UR STRIP: 5.5 [PH] (ref 5–7)
RBC #/AREA URNS AUTO: ABNORMAL /HPF
SP GR UR STRIP: <=1.005 (ref 1–1.03)
SQUAMOUS #/AREA URNS AUTO: ABNORMAL /LPF
TRICHOMONAS, WET PREP: ABNORMAL
UROBILINOGEN UR STRIP-ACNC: 0.2 E.U./DL
WBC #/AREA URNS AUTO: ABNORMAL /HPF
WBC'S/HIGH POWER FIELD, WET PREP: ABNORMAL
YEAST, WET PREP: ABNORMAL

## 2024-10-15 PROCEDURE — 87086 URINE CULTURE/COLONY COUNT: CPT | Performed by: NURSE PRACTITIONER

## 2024-10-15 PROCEDURE — 99214 OFFICE O/P EST MOD 30 MIN: CPT | Performed by: NURSE PRACTITIONER

## 2024-10-15 PROCEDURE — 81001 URINALYSIS AUTO W/SCOPE: CPT | Performed by: NURSE PRACTITIONER

## 2024-10-15 PROCEDURE — 87210 SMEAR WET MOUNT SALINE/INK: CPT | Performed by: NURSE PRACTITIONER

## 2024-10-15 RX ORDER — NITROFURANTOIN 25; 75 MG/1; MG/1
100 CAPSULE ORAL 2 TIMES DAILY
Qty: 10 CAPSULE | Refills: 0 | Status: SHIPPED | OUTPATIENT
Start: 2024-10-15 | End: 2024-10-20

## 2024-10-15 RX ORDER — METRONIDAZOLE 500 MG/1
500 TABLET ORAL 2 TIMES DAILY
Qty: 14 TABLET | Refills: 0 | Status: SHIPPED | OUTPATIENT
Start: 2024-10-15 | End: 2024-10-22

## 2024-10-15 ASSESSMENT — ENCOUNTER SYMPTOMS
RESPIRATORY NEGATIVE: 1
DIFFICULTY URINATING: 0
DIARRHEA: 0
FREQUENCY: 1
VOMITING: 0
ABDOMINAL DISTENTION: 0
CARDIOVASCULAR NEGATIVE: 1
CONSTIPATION: 0
EYES NEGATIVE: 1
NAUSEA: 0
DYSURIA: 1
FLANK PAIN: 0
ABDOMINAL PAIN: 0
CONSTITUTIONAL NEGATIVE: 1

## 2024-10-15 NOTE — PROGRESS NOTES
Assessment & Plan       ICD-10-CM    1. Acute cystitis without hematuria  N30.00 nitroFURantoin macrocrystal-monohydrate (MACROBID) 100 MG capsule      2. Dysuria  R30.0 UA Macroscopic with reflex to Microscopic and Culture - Lab Collect     Wet prep - Clinic Collect     UA Microscopic with Reflex to Culture     Urine Culture      3. Bacterial vaginosis  N76.0 metroNIDAZOLE (FLAGYL) 500 MG tablet    B96.89           Patient Instructions   You currently have a vaginal infection called bacterial vaginosis, BV for short.   This is not a sexually transmitted infection.  BV typically occurs due to a change in pH balance of the vagina. The following things may cause BV to occur: douching, new soaps or lubricants, not wearing cotton underwear or oral sex. Sometimes we can't prevent BV because it can happen for no reason at all.   Sometimes BV is asymptomatic, but classically it causes thin or creamy odorous vaginal discharge. It can also cause some low abdominal discomfort.   Today we will treat this infection with a antibiotic called Metronidazole (Flagyl). Take this medication two times a day for 7 days. You must avoid drinking alcohol while you are taking this medication. If you drink while taking Flagyl you may experience severe headache, nausea/vomiting, or liver dysfunction. I also recommend taking daily probiotic.   Follow up if you continue to have symptoms after you have completed your treatment.   UTI  Take antibiotic as directed. Alternate Tylenol and Ibuprofen as needed for fever or discomfort. May trial OTC Pyridium as needed. I recommend cranberry supplements as well. Increase fluids. Monitor for new or worsening symptoms.     UA is suggestive of acute cystitis. Lack of flank pain, fever, nausea make me less concerned for an ascending infection. Patient will be treated with Macrobid until cultures return and will adjust as necessary. Side effects reviewed and discussed. Consider OTC pyridium, cranberry juice  and plenty of fluids.     Follow up with any problems, questions or concerns or if symptoms worsen or fail to improve. Patient agreed to plan and verbalized understanding.     Results for orders placed or performed in visit on 10/15/24 (from the past 24 hour(s))   UA Macroscopic with reflex to Microscopic and Culture - Lab Collect    Specimen: Urine, Midstream   Result Value Ref Range    Color Urine Yellow Colorless, Straw, Light Yellow, Yellow    Appearance Urine Clear Clear    Glucose Urine Negative Negative mg/dL    Bilirubin Urine Negative Negative    Ketones Urine Negative Negative mg/dL    Specific Gravity Urine <=1.005 1.003 - 1.035    Blood Urine Negative Negative    pH Urine 5.5 5.0 - 7.0    Protein Albumin Urine Negative Negative mg/dL    Urobilinogen Urine 0.2 0.2, 1.0 E.U./dL    Nitrite Urine Negative Negative    Leukocyte Esterase Urine Trace (A) Negative   Wet prep - Clinic Collect    Specimen: Vagina; Swab   Result Value Ref Range    Trichomonas Absent Absent    Yeast Absent Absent    Clue Cells Present (A) Absent    WBCs/high power field 2+ (A) None   UA Microscopic with Reflex to Culture   Result Value Ref Range    Bacteria Urine Few (A) None Seen /HPF    RBC Urine 0-2 0-2 /HPF /HPF    WBC Urine 10-25 (A) 0-5 /HPF /HPF    Squamous Epithelials Urine Moderate (A) None Seen /LPF       Subjective     Arleen is a 28 year old female who presents to clinic today for the following health issues:  Chief Complaint   Patient presents with    UTI     Dysuria, Polyuria and Hesitancy-unknown how long? Declines any STD screening     HPI  Arleen states she has had increased urinary frequency, urgency, and dysuria for the last couple of days.  She has had urinary tract infections in the past and states this is commonly her symptoms.  She denies any unusual vaginal discharge or pain.  She states she is not concerned about any sexually transmitted diseases.  She declines any testing of this.  She denies any fevers,  flank pain, nausea, vomiting, or diarrhea.  She states overall she feels well.      Review of Systems   Constitutional: Negative.    HENT: Negative.     Eyes: Negative.    Respiratory: Negative.     Cardiovascular: Negative.    Gastrointestinal:  Negative for abdominal distention, abdominal pain, constipation, diarrhea, nausea and vomiting.   Genitourinary:  Positive for dysuria, frequency and urgency. Negative for difficulty urinating, flank pain and vaginal discharge.       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      Social History     Tobacco Use    Smoking status: Never    Smokeless tobacco: Never   Substance Use Topics    Alcohol use: Not Currently           Objective    /74   Pulse 74   Temp 98.8  F (37.1  C)   Resp 18   Wt 75.8 kg (167 lb)   LMP  (LMP Unknown)   SpO2 98%   BMI 25.39 kg/m    Physical Exam  Vitals and nursing note reviewed.   Constitutional:       General: She is not in acute distress.     Appearance: Normal appearance. She is not ill-appearing.   HENT:      Head: Normocephalic and atraumatic.   Cardiovascular:      Rate and Rhythm: Normal rate and regular rhythm.      Heart sounds: Normal heart sounds.   Pulmonary:      Effort: Pulmonary effort is normal.      Breath sounds: Normal breath sounds.   Abdominal:      General: Abdomen is flat. Bowel sounds are normal.      Palpations: Abdomen is soft.      Tenderness: There is no abdominal tenderness. There is no right CVA tenderness, left CVA tenderness or guarding.   Skin:     General: Skin is warm and dry.   Neurological:      Mental Status: She is alert and oriented to person, place, and time.   Psychiatric:         Mood and Affect: Mood normal.          Behavior: Behavior normal.              Blanca Lawrence, NP

## 2024-10-15 NOTE — PATIENT INSTRUCTIONS
You currently have a vaginal infection called bacterial vaginosis, BV for short.   This is not a sexually transmitted infection.  BV typically occurs due to a change in pH balance of the vagina. The following things may cause BV to occur: douching, new soaps or lubricants, not wearing cotton underwear or oral sex. Sometimes we can't prevent BV because it can happen for no reason at all.   Sometimes BV is asymptomatic, but classically it causes thin or creamy odorous vaginal discharge. It can also cause some low abdominal discomfort.   Today we will treat this infection with a antibiotic called Metronidazole (Flagyl). Take this medication two times a day for 7 days. You must avoid drinking alcohol while you are taking this medication. If you drink while taking Flagyl you may experience severe headache, nausea/vomiting, or liver dysfunction. I also recommend taking daily probiotic.   Follow up if you continue to have symptoms after you have completed your treatment.   UTI  Take antibiotic as directed. Alternate Tylenol and Ibuprofen as needed for fever or discomfort. May trial OTC Pyridium as needed. I recommend cranberry supplements as well. Increase fluids. Monitor for new or worsening symptoms.     UA is suggestive of acute cystitis. Lack of flank pain, fever, nausea make me less concerned for an ascending infection. Patient will be treated with Macrobid until cultures return and will adjust as necessary. Side effects reviewed and discussed. Consider OTC pyridium, cranberry juice and plenty of fluids.

## 2024-10-16 LAB — BACTERIA UR CULT: NORMAL

## 2024-11-14 ENCOUNTER — VIRTUAL VISIT (OUTPATIENT)
Dept: GASTROENTEROLOGY | Facility: CLINIC | Age: 29
End: 2024-11-14
Attending: INTERNAL MEDICINE
Payer: COMMERCIAL

## 2024-11-14 DIAGNOSIS — K50.10 CROHN'S DISEASE OF COLON WITHOUT COMPLICATION (H): ICD-10-CM

## 2024-11-14 DIAGNOSIS — R11.0 NAUSEA: Primary | ICD-10-CM

## 2024-11-14 DIAGNOSIS — M12.9 ARTHROPATHY: ICD-10-CM

## 2024-11-14 DIAGNOSIS — E53.8 VITAMIN B12 DEFICIENCY (NON ANEMIC): ICD-10-CM

## 2024-11-14 RX ORDER — ONDANSETRON 4 MG/1
4 TABLET, ORALLY DISINTEGRATING ORAL EVERY 8 HOURS PRN
Qty: 30 TABLET | Refills: 3 | Status: SHIPPED | OUTPATIENT
Start: 2024-11-14

## 2024-11-14 NOTE — LETTER
11/14/2024      Arleen Owusu  [address]  [facility] [address]      Dear Colleague,    Thank you for referring your patient, Arleen Owusu, to the [facility] [place]. Please see a copy of my visit note below.    Virtual Visit Details    Type of service:  Video Visit     Originating Location (pt. Location): Home    Distant Location (provider location):  On-site  Platform used for Video Visit: ConnectYard    Video start: 8:18 AM  Video end: 8:29 AM      IBD CLINIC VISIT    CC/REFERRING MD:  Luis Alberto Jacobs    REASON FOR CONSULTATION: follow up CD    ASSESSMENT/PLAN:    Crohn's colitis -   Current medication: stelara every 8 week  Current clinical disease activity: HBI 0   Last endoscopic disease activity: Colonoscopy 2/2024 SES = 0    Doing well and we will continue current therapies. I think nausea could possibly be a side effect from azathioprine (see recommendations below)    She has endoscopic healing earlier this year and FCP in July normal (after she recovered from rotavirus and norovirus    -continue Stelara q 8 weeks  -continue azathioprine 100 mg (given by rheum for IBD associated arthropathy) - try taking in the evening  -due for maintenance labs tomorrow (I added a B12 and folate)    2. IBD associated arthropathy - CD in remission. Continue azathioprine and follow response    3. Nausea - I think may be related to azathioprine. Take azathioprine in the evening before bed (patient's then sleep through nausea). Can also take ondansetron 30 min before azathioprine    4. Constipation - miralax PRN. If taking ondansetron regularly this can exacerbate constipation. Counseled to be aware and take miralax regularly if needed    5. Prior history of recurrent c diff. No recent issues. Did have abx recently for BV and UTI (nitrofurantoin and flagyl respectively). If she develops watery diarrhea we will recheck c diff.    6. B12 deficiency - B12 shots. Recheck  B12    IBD HISTORY  Age at diagnosis: 22/23 (2019)  Extent of disease: right sided colitis  Disease phenotype: inflammatory  Melly-anal disease: none  Prior IBD surgeries: none  Prior IBD Medications:  Prednisone taper 2/2020  Entocort  Apriso    DRUG MONITORING  TPMT enzyme activity: 24 (WNL)     6-TGN/6-MMPN levels: NA     Biologic concentration: NA    DISEASE ASSESSMENT  Labs  Recent Labs   Lab Test 07/16/24  1104 03/02/24  1636 01/02/24  1611 10/02/23  0748 08/26/22  1318 02/04/22  1149   CRP  --   --   --   --   --  0.3   SED  --   --  10 8   < > 7   HGB 13.3   < > 13.1 12.4   < > 12.5    < > = values in this interval not displayed.     Endoscopy:   -Colonoscopy 2/14/22 - [facility] - 1. Mild erythema at appendiceal orifice. Otherwise totally normal colon  -Last endoscopic disease activity: Colonoscopy 4/2023 - [facility] - 5. Alphous ulcers, erythema and loss of vasculature cecum and proximal ascending  Enterography: MRE normal 3/3/22  Fecal filiberto >120 prior to Stelara start, now < 5 10/2023  C diff: negative 10/30/22  -July 2020, Oct 2020, Dec 2020 - then did 6 weeks vanco and none since       sIBDQ:   IBDQ Score Date IBDQ - Total Score  (Higher score better)   11/14/2024   7:12 AM 58   8/22/2023   9:48 AM 60   11/21/2022  10:29 PM 56   5/19/2022   1:57 PM 61   1/25/2022  12:44 PM 61       IBD Health Care Maintenance:    Follows with [facility]    Cancer Screening:  Colon cancer screening:  Given colonic disease, recommend patient undergo regular dysplasia surveillance   Next dysplasia screening is recommended 2027.    Skin cancer screening: Annual visual exam of skin by dermatologist since patient is immunocompromised    Depression Screening:  -- Over the last month, have you felt down, depressed, or hopeless? no  -- Over the last month, have you felt little interest or pleasure doing things? no    Misc:  -- Avoid tobacco use  -- Avoid NSAIDs as there is potentially a 25% chance of causing an IBD flare    Return to clinic in 6  months with Lan Trent and 12 months with Dr. Jacobs    Thank you for this consultation.  It was a pleasure to participate in the care of this patient; please contact us with any further questions.  I spent a total of 40 minutes during the day of encounter performed chart review, meeting with patient, patient counseling, care coordination, and documentation.      This note was created with voice recognition software, and while reviewed for accuracy, typos may remain.     Luis Alberto Jacobs MD  Division of Gastroenterology, Hepatology and Nutrition  [facility]  Pager: [phone]      HPI:   Currently, here for follow up.     Overall reports doing well. No diarrhea. No abdominal pain. No obstructive symptoms. No rashes, mouth sores or eye pain.    Has had IBD associated arthropathy. Saw [facility] Rheum who felt symptoms c/w with this. Started on sulfasalazine but had side effects.     Then started on azathioprine and is on 100 mg daily. Started 6 weeks ago and up to 100 mg 2-3 weeks ago. In the last week she has noticed some nausea. No vomiting. No other Crohn's symptoms.    She does feel joint symptoms improving.    Weight stable.    Overall notes she is doing quite well. Summer and fall have been good.    ROS:    No fevers or chills  No weight loss  No blurry vision, double vision or change in vision  No sore throat  No lymphadenopathy  No headache, paraesthesias, or weakness in a limb  No shortness of breath or wheezing  No chest pain or pressure  No arthralgias or myalgias  No rashes or skin changes  No odynophagia or dysphagia  No BRBPR, hematochezia, melena  No dysuria, frequency or urgency  No hot/cold intolerance or polyria  No anxiety or depression    Extra intestinal manifestations of IBD:  No uveitis/episcleritis  No aphthous ulcers   No arthritis   No erythema nodosum/pyoderma gangrenosum.     PERTINENT PAST MEDICAL HISTORY:  No past medical history on file.    PREVIOUS SURGERIES:  Past Surgical History:    Procedure Laterality Date     COLONOSCOPY N/A 2/14/2022    Procedure: COLONOSCOPY, WITH  BIOPSY;  Surgeon: Yancy Jacobs MD;  Location: [facility] OR     COLONOSCOPY N/A 4/3/2023    Procedure: COLONOSCOPY, WITH BIOPSY;  Surgeon: Yancy Jacobs MD;  Location: [facility] OR     COLONOSCOPY N/A 2/19/2024    Procedure: COLONOSCOPY, WITH POLYPECTOMY AND BIOPSY, WITH CLIP;  Surgeon: Yancy Jacobs MD;  [place] [facility] [place]       PREVIOUS ENDOSCOPY:  Results for orders placed or performed during the hospital encounter of 02/19/24   COLONOSCOPY    Collection Time: 02/19/24  3:17 PM   Result Value Ref Range    COLONOSCOPY       [facility]  [address] [place] [address]., [place] [address] [phone]     Endoscopy Department  _______________________________________________________________________________  Patient Name: Arleen Owusu              Procedure Date: 2/19/2024 3:17 PM  MRN: [id number]                       Account Number: [id number]  YOB: 1995             Admit Type: Outpatient  Age: 28                               [place] [facility] [place] [facility]  Gender: Female                        Note Status: Finalized  Attending MD: YANCY JACOBS MD,   Total Sedation Time:   _______________________________________________________________________________     Procedure:             Colonoscopy  Indications:           Disease activity assessment of Crohn's disease of the                          colon, Assess therapeutic response to therapy of                          Crohn's disease of the colon  Providers:             YANCY JACOBS MD, Cheri Arroyo, SONAL,                           Hannah Kothari, CRNA  Referring MD:          JOANNA OHARA  Requesting Provider:   JOANNA OHARA  Medicines:             Monitored Anesthesia Care  Complications:         No immediate  complications.  _______________________________________________________________________________  Procedure:             Pre-Anesthesia Assessment:                         - See EPIC H and P note                         After obtaining informed consent, the colonoscope was                          passed under direct vision. Throughout the procedure,                          the patient's blood pressure, pulse, and oxygen                          saturations were monitored continuously. The                          Colonoscope was introduced through the anus and                          advanced to the terminal ileum, with identification of                          the appendiceal orifice and IC valve. The colonoscopy                          was performed without difficulty. The patie nt                          tolerated the procedure well. The quality of the bowel                          preparation was evaluated using the BBPS ([place] Bowel                          Preparation Scale) with scores of: Right Colon = 3,                          Transverse Colon = 3 and Left Colon = 3 (entire mucosa                          seen well with no residual staining, small fragments                          of stool or opaque liquid). The total BBPS score                          equals 9.                                                                                   Findings:       Skin tags were found on perianal exam.       The Simple Endoscopic Score for Crohn's Disease was determined based on        the endoscopic appearance of the mucosa in the following segments:       - Ileum: Findings include no ulcers present, no ulcerated surfaces, no        affected surfaces and no narrowings. Segment score: 0.       - Right Colon: Findings include no ulcers present, no ulcer ated        surfaces, no affected surfaces and no narrowings. Segment score: 0.       - Transverse Colon: Findings include no ulcers present, no  ulcerated        surfaces, no affected surfaces and no narrowings. Segment score: 0.       - Left Colon: Findings include no ulcers present, no ulcerated surfaces,        no affected surfaces and no narrowings. Segment score: 0.       - Rectum: Findings include no ulcers present, no ulcerated surfaces, no        affected surfaces and no narrowings. Segment score: 0.       - Total SES-CD aggregate score: 0. Biopsies were taken with a cold        forceps for histology (right colon, left colon and rectum and placed in        separate jars).       A 5 mm polyp was found in the cecum adjacent to the appendiceal orifice.        The polyp was flat. The polyp was removed with a cold snare. Resection        and retrieval were complete. To prevent bleeding after the polypectomy,        one hemostatic clip was successfully placed (MR conditional). Clip         : Purchasing Platform. There was no bleeding during, or at the end, of        the procedure.                                                                                   Impression:            - Perianal skin tags found on perianal exam.                         - Simple Endoscopic Score for Crohn's Disease: 0,                          mucosal inflammatory changes secondary to Crohn's                          disease, in remission. Biopsied.                         - One 5 mm polyp in the cecum adjacent to the                          appendiceal orifice, removed with a cold snare.                          Resected and retrieved. Clip (MR conditional) was                          placed.                         - Due to small caliber rectal vault no retroflexion in                          the rectrum was performed. Right sided colon                          retroflexion was performed.                         She has endoscopic healing on her current therapies.  Recommendatio n:        - Discharge patient to home (with escort).                         - Resume  previous diet.                         - Continue present medications.                         - Await pathology results.                         - Repeat colonoscopy in 3 years for surveillance based                          on pathology results.                         - Return to GI clinic as previously scheduled.                                                                                     Electronically Signed by: Dr. Luis Alberto Schmitz  ___________________________  LUIS ALBERTO SCHMITZ MD  2/19/2024 4:20:28 PM  I was physically present for the entire viewing portion of the exam.  __________________________  Signature of teaching physician  LUIS ALBERTO SCHMITZ MD  Number of Addenda: 0    Note Initiated On: 2/19/2024 3:17 PM  Scope In: 3:34:46 PM  Scope Out: 3:55:35 PM     ]    ALLERGIES:     Allergies   Allergen Reactions     Ibuprofen      Pt has an autoimmune disease it causes a negative colon reaction , can tolerate tylenol     Levonorgestrel-Ethinyl Estrad Headache, Hives and Itching     Seasonale Headache, Hives and Itching     Latex Rash       PERTINENT MEDICATIONS:    Current Outpatient Medications:      ondansetron (ZOFRAN ODT) 4 MG ODT tab, Take 1 tablet (4 mg) by mouth every 8 hours as needed for nausea., Disp: 30 tablet, Rfl: 3     azaTHIOprine (IMURAN) 50 MG tablet, Take 50 mg by mouth. Take 2 tablets once daily, Disp: , Rfl:      clindamycin (CLEOCIN T) 1 % external lotion, , Disp: , Rfl:      cyanocobalamin (CYANOCOBALAMIN) 1000 MCG/ML injection, Inject 1 mL (1,000 mcg) into the muscle every 30 days, Disp: 1 mL, Rfl: 11     EPINEPHrine (ANY BX GENERIC EQUIV) 0.3 MG/0.3ML injection 2-pack, , Disp: , Rfl:      fluticasone (FLONASE) 50 MCG/ACT nasal spray, Spray 1-2 sprays into both nostrils 2 times daily as needed, Disp: , Rfl:      NEEDLES, ANY SIZE, Please use 27 gauge 1 inch needle to inject subcutaneous stelara as directed every 8 weeks., Disp: 10 each, Rfl: 1     predniSONE (DELTASONE) 5 MG  "tablet, , Disp: , Rfl:      sertraline (ZOLOFT) 100 MG tablet, Take 1 tablet (100 mg) by mouth 2 times daily, Disp: 180 tablet, Rfl: 0     Syringe/Needle, Disp, 20G X 1-1/2\" 3 ML MISC, Use as directed with Stelara every 8 weeks., Disp: 10 each, Rfl: 1     ustekinumab (STELARA) 45 MG/0.5ML SOLN, Inject 90mg (1mL) subcutaneously every 8 weeks, Disp: 1 mL, Rfl: 2    Current Facility-Administered Medications:      cyanocobalamin injection 1,000 mcg, 1,000 mcg, Intramuscular, Q30 Days, Luis Alberto Jacobs MD, 1,000 mcg at 08/07/24 0914     medroxyPROGESTERone (DEPO-PROVERA) injection 150 mg, 150 mg, Intramuscular, Q90 Days, Saftner, Kaya, CNM     medroxyPROGESTERone (DEPO-PROVERA) injection 150 mg, 150 mg, Intramuscular, Q90 Days, Chin, Kaya, APRN CNM, 150 mg at 05/08/24 1145    SOCIAL HISTORY:  Social History     Socioeconomic History     Marital status: Single     Spouse name: Not on file     Number of children: Not on file     Years of education: Not on file     Highest education level: Not on file   Occupational History     Not on file   Tobacco Use     Smoking status: Never     Smokeless tobacco: Never   Vaping Use     Vaping status: Never Used   Substance and Sexual Activity     Alcohol use: Not Currently     Drug use: Never     Sexual activity: Not on file   Other Topics Concern     Not on file   Social History Narrative     Not on file     Social Drivers of Health     Financial Resource Strain: Not on file   Food Insecurity: Not on file   Transportation Needs: Not on file   Physical Activity: Not on file   Stress: Not on file   Social Connections: Not on file   Interpersonal Safety: Not on file   Housing Stability: Not on file       FAMILY HISTORY:  No family history on file.    Past/family/social history reviewed and no changes    PHYSICAL EXAMINATION:  Constitutional: aaox3, cooperative, pleasant, not dyspneic/diaphoretic, no acute distress  Vitals reviewed: LMP  (LMP Unknown)   Wt:   Wt Readings " from Last 2 Encounters:   10/15/24 75.8 kg (167 lb)   10/10/24 74.8 kg (165 lb)      Eyes: Sclera anicteric/injected  Respiratory: Unlabored breathing  Skin: no jaundice  Psych: Normal affect      PERTINENT STUDIES:  Most recent CBC:  Recent Labs   Lab Test 07/16/24  1104 03/02/24  2335   WBC 8.2 6.4   HGB 13.3 14.3   HCT 40.0 41.6    237     Most recent hepatic panel:  Recent Labs   Lab Test 07/16/24  1104 03/02/24  2335   ALT 17 16   AST 18 24     Most recent creatinine:  Recent Labs   Lab Test 03/02/24  2335 01/02/24  1611   CR 0.74 0.66             Again, thank you for allowing me to participate in the care of your patient.        Sincerely,        Luis Alberto Jacobs MD

## 2024-11-14 NOTE — PROGRESS NOTES
Virtual Visit Details    Type of service:  Video Visit     Originating Location (pt. Location): Home    Distant Location (provider location):  On-site  Platform used for Video Visit: Nessa    Video start: 8:18 AM  Video end: 8:29 AM      IBD CLINIC VISIT    CC/REFERRING MD:  Luis Alberto Jacobs    REASON FOR CONSULTATION: follow up CD    ASSESSMENT/PLAN:    Crohn's colitis -   Current medication: stelara every 8 week  Current clinical disease activity: HBI 0   Last endoscopic disease activity: Colonoscopy 2/2024 SES = 0    Doing well and we will continue current therapies. I think nausea could possibly be a side effect from azathioprine (see recommendations below)    She has endoscopic healing earlier this year and FCP in July normal (after she recovered from rotavirus and norovirus    -continue Stelara q 8 weeks  -continue azathioprine 100 mg (given by rheum for IBD associated arthropathy) - try taking in the evening  -due for maintenance labs tomorrow (I added a B12 and folate)    2. IBD associated arthropathy - CD in remission. Continue azathioprine and follow response    3. Nausea - I think may be related to azathioprine. Take azathioprine in the evening before bed (patient's then sleep through nausea). Can also take ondansetron 30 min before azathioprine    4. Constipation - miralax PRN. If taking ondansetron regularly this can exacerbate constipation. Counseled to be aware and take miralax regularly if needed    5. Prior history of recurrent c diff. No recent issues. Did have abx recently for BV and UTI (nitrofurantoin and flagyl respectively). If she develops watery diarrhea we will recheck c diff.    6. B12 deficiency - B12 shots. Recheck B12    IBD HISTORY  Age at diagnosis: 22/23 (2019)  Extent of disease: right sided colitis  Disease phenotype: inflammatory  Melly-anal disease: none  Prior IBD surgeries: none  Prior IBD Medications:  Prednisone taper 2/2020  Entocort  Apriso    DRUG MONITORING  TPMT  enzyme activity: 24 (WNL)     6-TGN/6-MMPN levels: NA     Biologic concentration: NA    DISEASE ASSESSMENT  Labs  Recent Labs   Lab Test 07/16/24  1104 03/02/24  1636 01/02/24  1611 10/02/23  0748 08/26/22  1318 02/04/22  1149   CRP  --   --   --   --   --  0.3   SED  --   --  10 8   < > 7   HGB 13.3   < > 13.1 12.4   < > 12.5    < > = values in this interval not displayed.     Endoscopy:   -Colonoscopy 2/14/22 - [facility] - 1. Mild erythema at appendiceal orifice. Otherwise totally normal colon  -Last endoscopic disease activity: Colonoscopy 4/2023 - [facility] - 5. Alphous ulcers, erythema and loss of vasculature cecum and proximal ascending  Enterography: MRE normal 3/3/22  Fecal filiberto >120 prior to Stelara start, now < 5 10/2023  C diff: negative 10/30/22  -July 2020, Oct 2020, Dec 2020 - then did 6 weeks vanco and none since       sIBDQ:   IBDQ Score Date IBDQ - Total Score  (Higher score better)   11/14/2024   7:12 AM 58   8/22/2023   9:48 AM 60   11/21/2022  10:29 PM 56   5/19/2022   1:57 PM 61   1/25/2022  12:44 PM 61       IBD Health Care Maintenance:    Follows with [facility]    Cancer Screening:  Colon cancer screening:  Given colonic disease, recommend patient undergo regular dysplasia surveillance   Next dysplasia screening is recommended 2027.    Skin cancer screening: Annual visual exam of skin by dermatologist since patient is immunocompromised    Depression Screening:  -- Over the last month, have you felt down, depressed, or hopeless? no  -- Over the last month, have you felt little interest or pleasure doing things? no    Misc:  -- Avoid tobacco use  -- Avoid NSAIDs as there is potentially a 25% chance of causing an IBD flare    Return to clinic in 6 months with Lan Trent and 12 months with Dr. Jacobs    Thank you for this consultation.  It was a pleasure to participate in the care of this patient; please contact us with any further questions.  I spent a total of 40 minutes during the day of encounter performed  chart review, meeting with patient, patient counseling, care coordination, and documentation.      This note was created with voice recognition software, and while reviewed for accuracy, typos may remain.     Luis Alberto Jacobs MD  Division of Gastroenterology, Hepatology and Nutrition  [facility]  Pager: [phone]      HPI:   Currently, here for follow up.     Overall reports doing well. No diarrhea. No abdominal pain. No obstructive symptoms. No rashes, mouth sores or eye pain.    Has had IBD associated arthropathy. Saw [facility] Rheum who felt symptoms c/w with this. Started on sulfasalazine but had side effects.     Then started on azathioprine and is on 100 mg daily. Started 6 weeks ago and up to 100 mg 2-3 weeks ago. In the last week she has noticed some nausea. No vomiting. No other Crohn's symptoms.    She does feel joint symptoms improving.    Weight stable.    Overall notes she is doing quite well. Summer and fall have been good.    ROS:    No fevers or chills  No weight loss  No blurry vision, double vision or change in vision  No sore throat  No lymphadenopathy  No headache, paraesthesias, or weakness in a limb  No shortness of breath or wheezing  No chest pain or pressure  No arthralgias or myalgias  No rashes or skin changes  No odynophagia or dysphagia  No BRBPR, hematochezia, melena  No dysuria, frequency or urgency  No hot/cold intolerance or polyria  No anxiety or depression    Extra intestinal manifestations of IBD:  No uveitis/episcleritis  No aphthous ulcers   No arthritis   No erythema nodosum/pyoderma gangrenosum.     PERTINENT PAST MEDICAL HISTORY:  No past medical history on file.    PREVIOUS SURGERIES:  Past Surgical History:   Procedure Laterality Date    COLONOSCOPY N/A 2/14/2022    Procedure: COLONOSCOPY, WITH  BIOPSY;  Surgeon: Luis Alberto Jacobs MD;  Location: [facility] OR    COLONOSCOPY N/A 4/3/2023    Procedure: COLONOSCOPY, WITH BIOPSY;  Surgeon: Luis Alberto Jacobs MD;   Location: [facility] OR    COLONOSCOPY N/A 2/19/2024    Procedure: COLONOSCOPY, WITH POLYPECTOMY AND BIOPSY, WITH CLIP;  Surgeon: Yancy Jacobs MD;  Location: [facility] OR       PREVIOUS ENDOSCOPY:  Results for orders placed or performed during the hospital encounter of 02/19/24   COLONOSCOPY    Collection Time: 02/19/24  3:17 PM   Result Value Ref Range    COLONOSCOPY       [facility]  [address] [place] [address]., [place] [address] [phone]     Endoscopy Department  _______________________________________________________________________________  Patient Name: rAleen Owusu              Procedure Date: 2/19/2024 3:17 PM  MRN: [id number]                       Account Number: [id number]  YOB: 1995             Admit Type: Outpatient  Age: 28                               Room: [facility] PROCEDURE ROOM 02  Gender: Female                        Note Status: Finalized  Attending MD: YANCY JACOBS MD,   Total Sedation Time:   _______________________________________________________________________________     Procedure:             Colonoscopy  Indications:           Disease activity assessment of Crohn's disease of the                          colon, Assess therapeutic response to therapy of                          Crohn's disease of the colon  Providers:             YANCY JACOBS MD, Cheri Arroyo RN,                           Hannah Kothari, CRNA  Referring MD:          JOANNA OHARA  Requesting Provider:   JOANNA OHARA  Medicines:             Monitored Anesthesia Care  Complications:         No immediate complications.  _______________________________________________________________________________  Procedure:             Pre-Anesthesia Assessment:                         - See EPIC H and P note                         After obtaining informed consent, the colonoscope was                          passed under direct vision. Throughout the procedure,                           the patient's blood pressure, pulse, and oxygen                          saturations were monitored continuously. The                          Colonoscope was introduced through the anus and                          advanced to the terminal ileum, with identification of                          the appendiceal orifice and IC valve. The colonoscopy                          was performed without difficulty. The patie nt                          tolerated the procedure well. The quality of the bowel                          preparation was evaluated using the BBPS ([place] Bowel                          Preparation Scale) with scores of: Right Colon = 3,                          Transverse Colon = 3 and Left Colon = 3 (entire mucosa                          seen well with no residual staining, small fragments                          of stool or opaque liquid). The total BBPS score                          equals 9.                                                                                   Findings:       Skin tags were found on perianal exam.       The Simple Endoscopic Score for Crohn's Disease was determined based on        the endoscopic appearance of the mucosa in the following segments:       - Ileum: Findings include no ulcers present, no ulcerated surfaces, no        affected surfaces and no narrowings. Segment score: 0.       - Right Colon: Findings include no ulcers present, no ulcer ated        surfaces, no affected surfaces and no narrowings. Segment score: 0.       - Transverse Colon: Findings include no ulcers present, no ulcerated        surfaces, no affected surfaces and no narrowings. Segment score: 0.       - Left Colon: Findings include no ulcers present, no ulcerated surfaces,        no affected surfaces and no narrowings. Segment score: 0.       - Rectum: Findings include no ulcers present, no ulcerated surfaces, no        affected surfaces and no narrowings. Segment score: 0.        - Total SES-CD aggregate score: 0. Biopsies were taken with a cold        forceps for histology (right colon, left colon and rectum and placed in        separate jars).       A 5 mm polyp was found in the cecum adjacent to the appendiceal orifice.        The polyp was flat. The polyp was removed with a cold snare. Resection        and retrieval were complete. To prevent bleeding after the polypectomy,        one hemostatic clip was successfully placed (MR conditional). Clip         : [employer]. There was no bleeding during, or at the end, of        the procedure.                                                                                   Impression:            - Perianal skin tags found on perianal exam.                         - Simple Endoscopic Score for Crohn's Disease: 0,                          mucosal inflammatory changes secondary to Crohn's                          disease, in remission. Biopsied.                         - One 5 mm polyp in the cecum adjacent to the                          appendiceal orifice, removed with a cold snare.                          Resected and retrieved. Clip (MR conditional) was                          placed.                         - Due to small caliber rectal vault no retroflexion in                          the rectrum was performed. Right sided colon                          retroflexion was performed.                         She has endoscopic healing on her current therapies.  Recommendatio n:        - Discharge patient to home (with escort).                         - Resume previous diet.                         - Continue present medications.                         - Await pathology results.                         - Repeat colonoscopy in 3 years for surveillance based                          on pathology results.                         - Return to GI clinic as previously scheduled.                                                                "                      Electronically Signed by: Dr. Luis Alberto Schmitz  ___________________________  LUIS ALBERTO SCHMITZ MD  2/19/2024 4:20:28 PM  I was physically present for the entire viewing portion of the exam.  __________________________  Signature of teaching physician  LUIS ALBERTO SCHMITZ MD  Number of Addenda: 0    Note Initiated On: 2/19/2024 3:17 PM  Scope In: 3:34:46 PM  Scope Out: 3:55:35 PM     ]    ALLERGIES:     Allergies   Allergen Reactions    Ibuprofen      Pt has an autoimmune disease it causes a negative colon reaction , can tolerate tylenol    Levonorgestrel-Ethinyl Estrad Headache, Hives and Itching    Seasonale Headache, Hives and Itching    Latex Rash       PERTINENT MEDICATIONS:    Current Outpatient Medications:     ondansetron (ZOFRAN ODT) 4 MG ODT tab, Take 1 tablet (4 mg) by mouth every 8 hours as needed for nausea., Disp: 30 tablet, Rfl: 3    azaTHIOprine (IMURAN) 50 MG tablet, Take 50 mg by mouth. Take 2 tablets once daily, Disp: , Rfl:     clindamycin (CLEOCIN T) 1 % external lotion, , Disp: , Rfl:     cyanocobalamin (CYANOCOBALAMIN) 1000 MCG/ML injection, Inject 1 mL (1,000 mcg) into the muscle every 30 days, Disp: 1 mL, Rfl: 11    EPINEPHrine (ANY BX GENERIC EQUIV) 0.3 MG/0.3ML injection 2-pack, , Disp: , Rfl:     fluticasone (FLONASE) 50 MCG/ACT nasal spray, Spray 1-2 sprays into both nostrils 2 times daily as needed, Disp: , Rfl:     NEEDLES, ANY SIZE, Please use 27 gauge 1 inch needle to inject subcutaneous stelara as directed every 8 weeks., Disp: 10 each, Rfl: 1    predniSONE (DELTASONE) 5 MG tablet, , Disp: , Rfl:     sertraline (ZOLOFT) 100 MG tablet, Take 1 tablet (100 mg) by mouth 2 times daily, Disp: 180 tablet, Rfl: 0    Syringe/Needle, Disp, 20G X 1-1/2\" 3 ML MISC, Use as directed with Stelara every 8 weeks., Disp: 10 each, Rfl: 1    ustekinumab (STELARA) 45 MG/0.5ML SOLN, Inject 90mg (1mL) subcutaneously every 8 weeks, Disp: 1 mL, Rfl: 2    Current Facility-Administered " Medications:     cyanocobalamin injection 1,000 mcg, 1,000 mcg, Intramuscular, Q30 Days, Luis Alberto Jacobs MD, 1,000 mcg at 08/07/24 0914    medroxyPROGESTERone (DEPO-PROVERA) injection 150 mg, 150 mg, Intramuscular, Q90 Days, SaftnerKaya, CNM    medroxyPROGESTERone (DEPO-PROVERA) injection 150 mg, 150 mg, Intramuscular, Q90 Days, Kaya Matthew APRN CNM, 150 mg at 05/08/24 1145    SOCIAL HISTORY:  Social History     Socioeconomic History    Marital status: Single     Spouse name: Not on file    Number of children: Not on file    Years of education: Not on file    Highest education level: Not on file   Occupational History    Not on file   Tobacco Use    Smoking status: Never    Smokeless tobacco: Never   Vaping Use    Vaping status: Never Used   Substance and Sexual Activity    Alcohol use: Not Currently    Drug use: Never    Sexual activity: Not on file   Other Topics Concern    Not on file   Social History Narrative    Not on file     Social Drivers of Health     Financial Resource Strain: Not on file   Food Insecurity: Not on file   Transportation Needs: Not on file   Physical Activity: Not on file   Stress: Not on file   Social Connections: Not on file   Interpersonal Safety: Not on file   Housing Stability: Not on file       FAMILY HISTORY:  No family history on file.    Past/family/social history reviewed and no changes    PHYSICAL EXAMINATION:  Constitutional: aaox3, cooperative, pleasant, not dyspneic/diaphoretic, no acute distress  Vitals reviewed: LMP  (LMP Unknown)   Wt:   Wt Readings from Last 2 Encounters:   10/15/24 75.8 kg (167 lb)   10/10/24 74.8 kg (165 lb)      Eyes: Sclera anicteric/injected  Respiratory: Unlabored breathing  Skin: no jaundice  Psych: Normal affect      PERTINENT STUDIES:  Most recent CBC:  Recent Labs   Lab Test 07/16/24  1104 03/02/24  2335   WBC 8.2 6.4   HGB 13.3 14.3   HCT 40.0 41.6    237     Most recent hepatic panel:  Recent Labs   Lab Test  07/16/24  1104 03/02/24  2335   ALT 17 16   AST 18 24     Most recent creatinine:  Recent Labs   Lab Test 03/02/24  2335 01/02/24  1611   CR 0.74 0.66

## 2024-11-14 NOTE — PATIENT INSTRUCTIONS
It was a pleasure meeting with you today and discussing your healthcare plan. Below is a summary of what we covered:    Continue your Stelara.    It is possible the azathioprine may be contributing to nausea. Try taking this medication in the evening - often this can help. If needed you can take the zofran 30 minutes before the azathioprine to help with nausea    If you are taking the zofran every day sometimes this can cause constipation. If needed you can take some miralax to help with that.    Please get your labs as scheduled tomorrow.    Follow up with your rheumatologist as planned    Follow up with Lan Trent in 6 months and Dr. Jacobs in 12 months      Please see below for any additional questions and scheduling guidelines.    Sign up for [employer]: [employer] patient portal serves as a secure platform for accessing your medical records from the [facility]. Additionally, [employer] facilitates easy, timely, and secure messaging with your care team. If you have not signed up, you may do so by using the provided code or calling [phone].    Coordinating your care after your visit:  There are multiple options for scheduling your follow-up care based on your provider's recommendation.    How do I schedule a follow-up clinic appointment:   After your appointment, you may receive scheduling assistance with the Clinic Coordinators by having a seat in the waiting room and a Clinic Coordinator will call you up to schedule.  Virtual visits or after you leave the clinic:  Your provider has placed a follow-up order in the [employer] portal for scheduling your return appointment. A member of the scheduling team will contact you to schedule.  [facility] Scheduling: Timely scheduling through [employer] is advised to ensure appointment availability.   Call to schedule: You may schedule your follow-up appointment(s) by calling [phone], option 1.    How do I schedule my endoscopy or colonoscopy procedure:  If a  procedure, such as a colonoscopy or upper endoscopy was ordered by your provider, the scheduling team will contact you to schedule this procedure. Or you may choose to call to schedule at   [phone], option 2.  Please allow 20-30 minutes when scheduling a procedure.    How do I get my blood work done? To get your blood work done, you need to schedule a lab appointment at an [facility]. There are multiple ways to schedule:   At the clinic: The Clinic Coordinator you meet after your visit can help you schedule a lab appointment.   [facility] scheduling: [employer] offers online lab scheduling at all [facility] laboratory locations.   Call to schedule: You can call [phone] to schedule your lab appointment.    How do I schedule my imaging study: To schedule imaging studies, such as CT scans, ultrasounds, MRIs, or X-rays, contact Imaging Services at [phone].    How do I schedule a referral to another doctor: If your provider recommended a referral to another specialist(s), the referral order was placed by your provider. You will receive a phone call to schedule this referral, or you may choose to call the number attached to the referral to self-schedule.    For Post-Visit Question(s):  For any inquiries following today's visit:  Please utilize [employer] messaging and allow 48 hours for reply or contact the Call Center during normal business hours at [phone], option 3.  For Emergent After-hours questions, contact the On-Call GI Fellow through the [facility] at [phone].  In addition, you may contact your Nurse directly using the provided contact information.    Test Results:  Test results will be accessible via [employer] in compliance with the 21st Century Cures Act. This means that your results will be available to you at the same time as your provider. Often you may see your results before your provider does. Results are reviewed by staff within two weeks with  communication follow-up. Results may be released in the patient portal prior to your care team review.    Prescription Refill(s):  Medication prescribed by your provider will be addressed during your visit. For future refills, please coordinate with your pharmacy. If you have not had a recent clinic visit or routine labs, for your safety, your provider may not be able to refill your prescription.

## 2024-11-14 NOTE — NURSING NOTE
Current patient location: [address] [place], [place] [address]   [facility] [address]    Is the patient currently in the state of [place]? YES    Visit mode:VIDEO    If the visit is dropped, the patient can be reconnected by:VIDEO VISIT: Text to cell phone:   Telephone Information:   Mobile [phone]       Will anyone else be joining the visit? NO  (If patient encounters technical issues they should call [phone]    Are changes needed to the allergy or medication list? No    Are refills needed on medications prescribed by this physician? NO    Rooming Documentation:  Not applicable    Reason for visit: RECHECK    Emelina GUPTAF

## 2024-11-15 ENCOUNTER — LAB (OUTPATIENT)
Dept: LAB | Facility: CLINIC | Age: 29
End: 2024-11-15
Payer: COMMERCIAL

## 2024-11-15 DIAGNOSIS — E53.8 VITAMIN B12 DEFICIENCY (NON ANEMIC): ICD-10-CM

## 2024-11-15 DIAGNOSIS — K50.10 CROHN'S DISEASE OF COLON WITHOUT COMPLICATION (H): ICD-10-CM

## 2024-11-15 LAB — FOLATE SERPL-MCNC: 13.3 NG/ML (ref 4.6–34.8)

## 2024-11-15 PROCEDURE — 86481 TB AG RESPONSE T-CELL SUSP: CPT | Performed by: INTERNAL MEDICINE

## 2024-11-15 PROCEDURE — 82746 ASSAY OF FOLIC ACID SERUM: CPT | Performed by: INTERNAL MEDICINE

## 2024-11-15 PROCEDURE — 82607 VITAMIN B-12: CPT | Performed by: INTERNAL MEDICINE

## 2024-11-15 PROCEDURE — 99000 SPECIMEN HANDLING OFFICE-LAB: CPT | Performed by: PATHOLOGY

## 2024-11-15 PROCEDURE — 36415 COLL VENOUS BLD VENIPUNCTURE: CPT | Performed by: PATHOLOGY

## 2024-11-16 LAB — VIT B12 SERPL-MCNC: 275 PG/ML (ref 232–1245)

## 2024-11-17 LAB
GAMMA INTERFERON BACKGROUND BLD IA-ACNC: 0.01 IU/ML
M TB IFN-G BLD-IMP: NEGATIVE
M TB IFN-G CD4+ BCKGRND COR BLD-ACNC: 9.99 IU/ML
MITOGEN IGNF BCKGRD COR BLD-ACNC: 0 IU/ML
MITOGEN IGNF BCKGRD COR BLD-ACNC: 0.01 IU/ML
QUANTIFERON MITOGEN: 10 IU/ML
QUANTIFERON NIL TUBE: 0.01 IU/ML
QUANTIFERON TB1 TUBE: 0.01 IU/ML
QUANTIFERON TB2 TUBE: 0.02

## 2024-11-19 ENCOUNTER — PATIENT OUTREACH (OUTPATIENT)
Dept: CARE COORDINATION | Facility: CLINIC | Age: 29
End: 2024-11-19
Payer: COMMERCIAL

## 2024-11-27 ENCOUNTER — TELEPHONE (OUTPATIENT)
Dept: GASTROENTEROLOGY | Facility: CLINIC | Age: 29
End: 2024-11-27
Payer: COMMERCIAL

## 2024-11-27 NOTE — TELEPHONE ENCOUNTER
Patient confirmed scheduled appointment:  Date: 5/14/25 and 11/18/25  Time: 10 am and 1 pm  Visit type: return ibd  Provider: Lan Trent // Dr. Jacobs  Location: virtual// [facility]  Testing/imaging: NA  Additional notes: NA

## 2024-12-16 ENCOUNTER — TELEPHONE (OUTPATIENT)
Dept: FAMILY MEDICINE | Facility: CLINIC | Age: 29
End: 2024-12-16
Payer: COMMERCIAL

## 2024-12-16 NOTE — TELEPHONE ENCOUNTER
JEAN and sent a [employer] message notifying the patient that Blanca Olvera DNP does not do annual physicals as she is a same day provider at the [facility] and not a primary care provider. I told her that we would still be able to see her for medication refills or other healthcare questions or concerns she has at this time.    SONAL Priest

## 2024-12-18 ENCOUNTER — OFFICE VISIT (OUTPATIENT)
Dept: FAMILY MEDICINE | Facility: CLINIC | Age: 29
End: 2024-12-18
Payer: COMMERCIAL

## 2024-12-18 VITALS
DIASTOLIC BLOOD PRESSURE: 62 MMHG | SYSTOLIC BLOOD PRESSURE: 98 MMHG | HEIGHT: 68 IN | BODY MASS INDEX: 25.66 KG/M2 | WEIGHT: 169.3 LBS | HEART RATE: 63 BPM | TEMPERATURE: 98 F | RESPIRATION RATE: 12 BRPM | OXYGEN SATURATION: 98 %

## 2024-12-18 DIAGNOSIS — Z30.42 DEPO-PROVERA CONTRACEPTIVE STATUS: ICD-10-CM

## 2024-12-18 DIAGNOSIS — F32.4 MAJOR DEPRESSIVE DISORDER WITH SINGLE EPISODE, IN PARTIAL REMISSION (H): Primary | ICD-10-CM

## 2024-12-18 LAB — HCG UR QL: NEGATIVE

## 2024-12-18 PROCEDURE — 99213 OFFICE O/P EST LOW 20 MIN: CPT | Performed by: NURSE PRACTITIONER

## 2024-12-18 PROCEDURE — 81025 URINE PREGNANCY TEST: CPT | Performed by: NURSE PRACTITIONER

## 2024-12-18 RX ORDER — SERTRALINE HYDROCHLORIDE 100 MG/1
100 TABLET, FILM COATED ORAL 2 TIMES DAILY
Qty: 180 TABLET | Refills: 3 | Status: SHIPPED | OUTPATIENT
Start: 2024-12-18

## 2024-12-18 RX ADMIN — MEDROXYPROGESTERONE ACETATE 150 MG: 150 INJECTION, SUSPENSION INTRAMUSCULAR at 11:04

## 2024-12-18 ASSESSMENT — PATIENT HEALTH QUESTIONNAIRE - PHQ9
10. IF YOU CHECKED OFF ANY PROBLEMS, HOW DIFFICULT HAVE THESE PROBLEMS MADE IT FOR YOU TO DO YOUR WORK, TAKE CARE OF THINGS AT HOME, OR GET ALONG WITH OTHER PEOPLE: NOT DIFFICULT AT ALL
SUM OF ALL RESPONSES TO PHQ QUESTIONS 1-9: 5
SUM OF ALL RESPONSES TO PHQ QUESTIONS 1-9: 5

## 2024-12-18 NOTE — PROGRESS NOTES
Assessment & Plan     Major depressive disorder with single episode, in partial remission (H)  Refilling sertaline at current dose. Patient feels this is working well for symptoms.    Patient has been stable on this dose for a while and needs a new pcp. I am comfortable refilling this at current dose for patient. Patient to reach out if needs adjustment.   - sertraline (ZOLOFT) 100 MG tablet; Take 1 tablet (100 mg) by mouth 2 times daily.    Depo-Provera contraceptive status  Negative pregnancy testing-Patient missed a depo dose..  depo order is on file and patient was given this dose today in clinic.   - HCG qualitative urine; Future  - HCG qualitative urine                Subjective   Arleen is a 29 year old, presenting for the following health issues:  Medication Refill    History of Present Illness       Mental Health Follow-up:  Patient presents to follow-up on Depression.Patient's depression since last visit has been:  Medium  The patient is not having other symptoms associated with depression.      Any significant life events: No  Patient is not feeling anxious or having panic attacks.  Patient has no concerns about alcohol or drug use.    She eats 2-3 servings of fruits and vegetables daily.She consumes 0 sweetened beverage(s) daily.She exercises with enough effort to increase her heart rate 10 to 19 minutes per day.  She exercises with enough effort to increase her heart rate 3 or less days per week. She is missing 2 dose(s) of medications per week.  She is not taking prescribed medications regularly due to remembering to take.         5/23/2023     7:15 AM 7/15/2024     2:16 PM 12/18/2024     9:33 AM   PHQ   PHQ-9 Total Score 5 6 5    Q9: Thoughts of better off dead/self-harm past 2 weeks Not at all  Not at all  Not at all        Patient-reported                      Objective    BP 98/62 (BP Location: Right arm, Patient Position: Sitting, Cuff Size: Adult Regular)   Pulse 63   Temp 98  F (36.7  C) (Oral)  "  Resp 12   Ht 1.727 m (5' 7.99\")   Wt 76.8 kg (169 lb 4.8 oz)   LMP 11/18/2024 (Approximate)   SpO2 98%   BMI 25.75 kg/m    Body mass index is 25.75 kg/m .  Physical Exam  Constitutional:       Appearance: Normal appearance.   Neurological:      General: No focal deficit present.      Mental Status: She is alert and oriented to person, place, and time.   Psychiatric:         Mood and Affect: Mood normal.         Behavior: Behavior normal.                    Signed Electronically by: GLORIA PETTY CNP    "

## 2025-01-21 ENCOUNTER — TRANSFERRED RECORDS (OUTPATIENT)
Dept: HEALTH INFORMATION MANAGEMENT | Facility: CLINIC | Age: 30
End: 2025-01-21
Payer: COMMERCIAL

## 2025-02-04 ENCOUNTER — DOCUMENTATION ONLY (OUTPATIENT)
Dept: GASTROENTEROLOGY | Facility: CLINIC | Age: 30
End: 2025-02-04
Payer: COMMERCIAL

## 2025-02-13 ENCOUNTER — DOCUMENTATION ONLY (OUTPATIENT)
Dept: GASTROENTEROLOGY | Facility: CLINIC | Age: 30
End: 2025-02-13
Payer: COMMERCIAL

## 2025-02-13 NOTE — PROGRESS NOTES
Sent patient mychart message to see hwat day works best for her B-12 shot appt.     Laurita WILSON MA

## 2025-02-17 ENCOUNTER — TELEPHONE (OUTPATIENT)
Dept: GASTROENTEROLOGY | Facility: CLINIC | Age: 30
End: 2025-02-17

## 2025-02-17 DIAGNOSIS — K50.10 CROHN'S DISEASE OF COLON WITHOUT COMPLICATION (H): Primary | ICD-10-CM

## 2025-02-17 DIAGNOSIS — K50.10 CROHN'S DISEASE OF COLON WITHOUT COMPLICATION (H): ICD-10-CM

## 2025-02-17 RX ORDER — SYRINGE, DISPOSABLE, 1 ML
SYRINGE, EMPTY DISPOSABLE MISCELLANEOUS
Qty: 10 EACH | Refills: 0 | OUTPATIENT
Start: 2025-02-17

## 2025-02-17 NOTE — TELEPHONE ENCOUNTER
"Last Written Prescription:      Syringe/Needle, Disp, 20G X 1-1/2\" 3 ML MISC 10 each 1 10/9/2024 -- No   Sig: Use as directed with Stelara every 8 weeks.     ----------------------  Last Visit Date: 11/14/24 Reynaldo  Future Visit Date: 5/14/25 KAREN  ----------------------         [x]  Refill decision: Medication unable to be refilled by RN due to: Medication not included in refill protocol policy   BD PLASTIPAK SYRINGE 3 ML MISC         Request from pharmacy:  Requested Prescriptions   Pending Prescriptions Disp Refills    BD PLASTIPAK SYRINGE 3 ML MISC 10 each 0     Sig: USE AS DIRECTED EVERY 8 WEEKS       There is no refill protocol information for this order      '  "

## 2025-02-17 NOTE — TELEPHONE ENCOUNTER
Supplies for Stelara vials (Syringe/needles) refilled.     Hugh Sebastian, PharmD, BCPS  MTM Pharmacist   [facility]  Phone: [phone]

## 2025-02-17 NOTE — TELEPHONE ENCOUNTER
Refilled in separate encounter.    Hugh Sebastian PharmD, BCPS  MTM Pharmacist   [facility]  Phone: [phone]

## 2025-02-17 NOTE — TELEPHONE ENCOUNTER
Hello looks like you guys sent over the bd plastipak and I do not see this on the med list and we never recd this please send over again

## 2025-03-17 ENCOUNTER — MYC MEDICAL ADVICE (OUTPATIENT)
Dept: GASTROENTEROLOGY | Facility: CLINIC | Age: 30
End: 2025-03-17
Payer: COMMERCIAL

## 2025-03-17 DIAGNOSIS — K50.10 CROHN'S DISEASE OF COLON WITHOUT COMPLICATION (H): Primary | ICD-10-CM

## 2025-03-18 ENCOUNTER — LAB (OUTPATIENT)
Dept: LAB | Facility: CLINIC | Age: 30
End: 2025-03-18
Payer: COMMERCIAL

## 2025-03-18 DIAGNOSIS — K50.10 CROHN'S DISEASE OF COLON WITHOUT COMPLICATION (H): ICD-10-CM

## 2025-03-18 LAB
ADV 40+41 DNA STL QL NAA+NON-PROBE: NEGATIVE
ASTRO TYP 1-8 RNA STL QL NAA+NON-PROBE: NEGATIVE
BASOPHILS # BLD AUTO: 0 10E3/UL (ref 0–0.2)
BASOPHILS NFR BLD AUTO: 0 %
C CAYETANENSIS DNA STL QL NAA+NON-PROBE: NEGATIVE
C DIFF TOX B STL QL: NEGATIVE
CAMPYLOBACTER DNA SPEC NAA+PROBE: NEGATIVE
CRYPTOSP DNA STL QL NAA+NON-PROBE: NEGATIVE
E COLI O157 DNA STL QL NAA+NON-PROBE: ABNORMAL
E HISTOLYT DNA STL QL NAA+NON-PROBE: NEGATIVE
EAEC ASTA GENE ISLT QL NAA+PROBE: NEGATIVE
EC STX1+STX2 GENES STL QL NAA+NON-PROBE: NEGATIVE
EOSINOPHIL # BLD AUTO: 0.1 10E3/UL (ref 0–0.7)
EOSINOPHIL NFR BLD AUTO: 1 %
EPEC EAE GENE STL QL NAA+NON-PROBE: NEGATIVE
ERYTHROCYTE [DISTWIDTH] IN BLOOD BY AUTOMATED COUNT: 13.8 % (ref 10–15)
ETEC LTA+ST1A+ST1B TOX ST NAA+NON-PROBE: NEGATIVE
G LAMBLIA DNA STL QL NAA+NON-PROBE: NEGATIVE
HCT VFR BLD AUTO: 40.5 % (ref 35–47)
HGB BLD-MCNC: 13.6 G/DL (ref 11.7–15.7)
IMM GRANULOCYTES # BLD: 0 10E3/UL
IMM GRANULOCYTES NFR BLD: 0 %
LYMPHOCYTES # BLD AUTO: 2 10E3/UL (ref 0.8–5.3)
LYMPHOCYTES NFR BLD AUTO: 31 %
MCH RBC QN AUTO: 28.4 PG (ref 26.5–33)
MCHC RBC AUTO-ENTMCNC: 33.6 G/DL (ref 31.5–36.5)
MCV RBC AUTO: 85 FL (ref 78–100)
MONOCYTES # BLD AUTO: 0.4 10E3/UL (ref 0–1.3)
MONOCYTES NFR BLD AUTO: 7 %
NEUTROPHILS # BLD AUTO: 4 10E3/UL (ref 1.6–8.3)
NEUTROPHILS NFR BLD AUTO: 61 %
NOROVIRUS GI+II RNA STL QL NAA+NON-PROBE: POSITIVE
P SHIGELLOIDES DNA STL QL NAA+NON-PROBE: NEGATIVE
PLATELET # BLD AUTO: 284 10E3/UL (ref 150–450)
RBC # BLD AUTO: 4.79 10E6/UL (ref 3.8–5.2)
RVA RNA STL QL NAA+NON-PROBE: NEGATIVE
SALMONELLA SP RPOD STL QL NAA+PROBE: NEGATIVE
SAPO I+II+IV+V RNA STL QL NAA+NON-PROBE: NEGATIVE
SHIGELLA SP+EIEC IPAH ST NAA+NON-PROBE: NEGATIVE
V CHOLERAE DNA SPEC QL NAA+PROBE: NEGATIVE
VIBRIO DNA SPEC NAA+PROBE: NEGATIVE
WBC # BLD AUTO: 6.5 10E3/UL (ref 4–11)
Y ENTEROCOL DNA STL QL NAA+PROBE: NEGATIVE

## 2025-03-18 PROCEDURE — 83993 ASSAY FOR CALPROTECTIN FECAL: CPT

## 2025-03-18 PROCEDURE — 80076 HEPATIC FUNCTION PANEL: CPT

## 2025-03-18 PROCEDURE — 85025 COMPLETE CBC W/AUTO DIFF WBC: CPT

## 2025-03-18 PROCEDURE — 87493 C DIFF AMPLIFIED PROBE: CPT | Mod: 59

## 2025-03-18 PROCEDURE — 87507 IADNA-DNA/RNA PROBE TQ 12-25: CPT

## 2025-03-18 PROCEDURE — 36415 COLL VENOUS BLD VENIPUNCTURE: CPT

## 2025-03-18 PROCEDURE — 86140 C-REACTIVE PROTEIN: CPT

## 2025-03-18 NOTE — TELEPHONE ENCOUNTER
Patient reporting abdominal pain and diarrhea, recently completed antibiotic. Stool studies ordered.

## 2025-03-19 LAB
ALBUMIN SERPL BCG-MCNC: 4.8 G/DL (ref 3.5–5.2)
ALP SERPL-CCNC: 78 U/L (ref 40–150)
ALT SERPL W P-5'-P-CCNC: 17 U/L (ref 0–50)
AST SERPL W P-5'-P-CCNC: 19 U/L (ref 0–45)
BILIRUB DIRECT SERPL-MCNC: 0.21 MG/DL (ref 0–0.3)
BILIRUB SERPL-MCNC: 0.6 MG/DL
CALPROTECTIN STL-MCNT: <5 MG/KG (ref 0–49.9)
CRP SERPL-MCNC: <3 MG/L
PROT SERPL-MCNC: 7.4 G/DL (ref 6.4–8.3)

## 2025-04-10 ENCOUNTER — VIRTUAL VISIT (OUTPATIENT)
Dept: PHARMACY | Facility: CLINIC | Age: 30
End: 2025-04-10
Attending: INTERNAL MEDICINE
Payer: COMMERCIAL

## 2025-04-10 VITALS — BODY MASS INDEX: 26.68 KG/M2 | WEIGHT: 170 LBS | HEIGHT: 67 IN

## 2025-04-10 DIAGNOSIS — Z78.9 TAKES DIETARY SUPPLEMENTS: ICD-10-CM

## 2025-04-10 DIAGNOSIS — F32.4 MAJOR DEPRESSIVE DISORDER WITH SINGLE EPISODE, IN PARTIAL REMISSION: ICD-10-CM

## 2025-04-10 DIAGNOSIS — M19.90 ARTHRITIS: ICD-10-CM

## 2025-04-10 DIAGNOSIS — K50.10 CROHN'S DISEASE OF COLON WITHOUT COMPLICATION (H): ICD-10-CM

## 2025-04-10 DIAGNOSIS — K50.10 CROHN'S DISEASE OF COLON WITHOUT COMPLICATION (H): Primary | ICD-10-CM

## 2025-04-10 RX ORDER — ZOSTER VACCINE RECOMBINANT, ADJUVANTED 50 MCG/0.5
1 KIT INTRAMUSCULAR ONCE
Qty: 1 EACH | Refills: 1 | Status: SHIPPED | OUTPATIENT
Start: 2025-04-10 | End: 2025-04-10

## 2025-04-10 RX ORDER — USTEKINUMAB 45 MG/.5ML
INJECTION, SOLUTION SUBCUTANEOUS
Qty: 1 ML | Refills: 2 | OUTPATIENT
Start: 2025-04-10

## 2025-04-10 ASSESSMENT — PAIN SCALES - GENERAL: PAINLEVEL_OUTOF10: MODERATE PAIN (4)

## 2025-04-10 NOTE — PROGRESS NOTES
Medication Therapy Management (MTM) Encounter    ASSESSMENT:                            Medication Adherence/Access: No issues identified.    Crohn's Disease:  Arleen would benefit from continued treatment with Stelara 90 mg every 8 weeks. They are up to date on routine maintenance labs. They are up to date on annual tuberculosis screening. No access issues for their advanced therapy are present. They are indicated for a few vaccinations which were recommended to them. Reminder provided to resume calcium and vitamin D supplementation. Reminders for routine cancer screening were provided.     Depression: Stable.     Arthritis:  Continue management per rheumatology. Feels some improvement with azathioprine.     Supplements: Stable. B12 and folate WNL.    PLAN:                            Continue medications as directed.  You are currently up to date on labs. Next due on 6/18/2025 for standing labs ordered under Lan Trent PA-C.  Arleen will consider the following vaccines:  COVID-19 vaccine  Shingles vaccine (Shingrix 2-dose series)- Prescription sent to [facility]  Hepatitis B 3-dose series- Prescription sent to [facility]   I've placed a referral to Health Psychology. If you don't hear from a  in 2-3 business days, you can call [phone] to schedule an appointment.  Start vitamin 1000 units daily and calcium 500 mg twice daily (can do once-daily if using extended release formulation)  I recommend a calcium goal of 1000 mg/day between diet and supplementation. Please review the foods you eat and see if you are getting at least 1000 mg/day of calcium from your diet. If you are not getting adequate calcium from your diet then consider supplementation. https://www.dietaryguidelines.gov/food-sources-calcium  Reminder to schedule a routine skin check with dermatology, you can call [phone] to schedule an appointment.    Follow-up: 10/9/2025 at 9:30 AM  (telephone)    SUBJECTIVE/OBJECTIVE:                          Arleen Owusu is a 29 year old female seen for a follow-up visit.       Reason for visit: Stelara follow-up visit.    Allergies/ADRs: Reviewed in chart  Past Medical History: Reviewed in chart  Tobacco: She reports that she has never smoked. She has never been exposed to tobacco smoke. She has never used smokeless tobacco.  Alcohol: none      Medication Adherence/Access: no issues reported.    Crohn's Disease:   Stelara 90 mg every 8 weeks (vials, latex allergy)  Ondansetron 4 mg every 8 hours as needed - uses infrequently (less than 1x/month)    Keeps track in calendar. Denies injection site reactions.      Specialty medication department: [facility]  Prior authorization status: stelara 45 mg/0.5 mL  approved through 2025  Original start date: Original start date: Stelara IV infusion 2023  Last dose: 2025   Next dose: 2025     PRO-3 for Crohn's Disease    Please select the one best answer for the patient's ability at this time     Over the past week, how many liquid or soft stools have you had on average per day?   0.3 (When scoring, multiply number by 2=0.6)   Over the past week, please rate your average abdominal pain  Mild: 5 points Sometimes towards end of dosing interval, maybe associated with diet  3. Over the past week, please rate your general well-being    Slightly Under Par: 7 points    Score: 12.6  <13: Remission  13-21: Mild Activity   22-52: Moderate Activity  >/= 53: Severe Activity     Last provider visit: 2024 MD Reynaldo  Next provider visit: 2025 REA Trent  Last labs completed: 3/18/2025  Last Tb screenin/15/2024  Lab frequency: every 3 months              - standing labs yes CRP, CBC with platelets & diff, hepatic reordered with this encounter  Next labs due: 2025     Last IBD Health Maintenance Review: 4/10/2024  PDC: 100%     IBD Health Maintenance    Vaccinations:  All patients on biologics  "should avoid live vaccines unless specifically indicated.    -- Influenza (every year)- last 10/2024  -- TdaP (every 10 years)- last 12/21/2015  -- Pneumococcal Pneumonia               Prevnar-13: not on file              Pneumovax-23: not on file              Prevnar-20: 5/2023  -- COVID-19 3/2021, 4/2021, 12/2021, 10/2022, 10/2023 (updated), 10/29/2024     One time confirmation of immunity or serologies:  -- Hepatitis A (serologies or immunizations)- 2/2008, 2/2007  -- Hepatitis B (serologies or immunizations) not immune by serology 2022  -- Varicella/Zoster               Varicella- not on file ([facility] notes: \"Immunity: Varicella (chicken pox) 7/21/2006\"              Zoster- not on file  -- MMR- 8/2000, 2/1997  -- HPV (all aged 18-26) 2/2008, 10/2007, 8/2007  -- Meningococcal meningitis (all patients at risk for meningitis)-- MenACWY: Menveo (7/2/2013, 8/1/2007), Menactra (8/1/2007)     Due to the immunosuppression in this patient, I would not advise administration of live vaccines such as varicella/VZV, intranasal influenza, MMR, or yellow fever vaccine (if traveling).      Immunosuppressive Screening:  -- Hep B Surface Antibody not immune by serology 2/4/2022  -- Hep B Surface Antigen non-reactive 2/4/2022  -- Hep B Core Antibody negative 2/4/2022  -- Hep C Antibody non-reactive 5/23/2023  -- Yearly assessment of TB Negative 11/15/2024    Lab Results   Component Value Date    AUSAB Negative 02/04/2022    HEPBANG Nonreactive 02/04/2022    HBCAB Negative 02/04/2022    HCVAB Nonreactive 05/23/2023    TBRES Negative 11/15/2024       Bone mineral density screening   -- Recommend all patients supplement with calcium and vitamin D  --    Cancer Screening:  Colon cancer screening:  Per Lan Trent PA-C 8/22/23: \"Given right sided colits, recommend patient undergo regular dysplasia surveillance   Next dysplasia screening is recommended 2027.\"     Cervical cancer screening: Annual " "due to immunosupression- last 5/23/23     Skin cancer screening: Annual visual exam of skin by dermatologist since patient is immunocompromised- [facility] 1/2024    Depression Screening:    PHQ-2 Score:         4/10/2024    12:23 PM 8/22/2023     9:42 AM   PHQ-2 ( 1999 Pfizer)   Q1: Little interest or pleasure in doing things 0 1   Q2: Feeling down, depressed or hopeless 0 0   PHQ-2 Score 0 1       Misc:  -- Avoid tobacco use  -- Avoid NSAIDs as there is potentially a 25% chance of causing an IBD flare      Depression:  Sertraline 100 mg twice daily    Denies side effects or concerns. Feels comfortable with current care plan.     Arthritis:   Azathioprine 100 mg once daily     Working with rheumatology with [facility] orthopedics. Originally started sulfasalazine around 9/12/2024 but discontinued due to adverse effects (feet tingle, hand tingle). Feels that week leading up to injection a litle bit more joint pain.      Started azathioprine 9/17/2024.  Having lab work every 2 weeks. Next follow-up visit in 2 weeks.     Supplements:  Vitamin B12 1000 mcg every 30 days  Calcium/Vitamin D -- not great about taking it    Last B12 injection 2/7/2025. Reminder provided to schedule next B12 injection. Encouraged restarting calcium/vitamin D. Last B12 11/15/2024. Last folate 11/15/2024.    Today's Vitals: Ht 5' 7\" (1.702 m)   Wt 170 lb (77.1 kg)   BMI 26.63 kg/m    ----------------      I spent 20 minutes with this patient today. All changes were made via collaborative practice agreement with Luis Alberto Jacobs MD.     A summary of these recommendations was sent via [employer].    Gerald Sebastian, PharmD, BCPS  MTM Pharmacist   [facility]  Phone: [phone]    Telemedicine Visit Details  The patient's medications can be safely assessed via a telemedicine encounter.  Type of service:  Video Conference via [employer]  Originating Location (pt. Location): Home    Distant Location (provider " location):  Off-site  Start Time:  9:30 AM  End Time:  9:50 AM     Medication Therapy Recommendations  No medication therapy recommendations to display

## 2025-04-10 NOTE — PROGRESS NOTES
"Virtual Visit Details    Type of service:  Video Visit     Originating Location (pt. Location): {video visit patient location:960680::\"Home\"}  {PROVIDER LOCATION On-site should be selected for visits conducted from your clinic location or adjoining [facility] hospital, academic office, or other nearby [facility] building. Off-site should be selected for all other provider locations, including home:292902}  Distant Location (provider location):  {virtual location provider:890665}  Platform used for Video Visit: {Virtual Visit Platforms:410863::\"Ovelin\"}  "

## 2025-04-10 NOTE — NURSING NOTE
Current patient location: [address] [place], [place] [address]   [facility] [address]    Is the patient currently in the state of [place]? YES    Visit mode: VIDEO    If the visit is dropped, the patient can be reconnected by:VIDEO VISIT: Send to e-mail at: [email or web address]    Will anyone else be joining the visit? NO  (If patient encounters technical issues they should call [phone]    Are changes needed to the allergy or medication list? No    Are refills needed on medications prescribed by this physician? NO    Rooming Documentation:  Questionnaire(s) not done per department protocol    Reason for visit: Consult    Carey NAIK

## 2025-04-10 NOTE — PATIENT INSTRUCTIONS
"Recommendations from today's MT visit:                                                      Continue medications as directed.  You are currently up to date on labs. Next due on 6/18/2025 for standing labs ordered under Lan Trent PA-C.  Arleen will consider the following vaccines:  COVID-19 vaccine  Shingles vaccine (Shingrix 2-dose series)- Prescription sent to [facility]  Hepatitis B 3-dose series- Prescription sent to [facility]   I've placed a referral to Health Psychology. If you don't hear from a  in 2-3 business days, you can call [phone] to schedule an appointment.  Start vitamin 1000 units daily and calcium 500 mg twice daily (can do once-daily if using extended release formulation)  I recommend a calcium goal of 1000 mg/day between diet and supplementation. Please review the foods you eat and see if you are getting at least 1000 mg/day of calcium from your diet. If you are not getting adequate calcium from your diet then consider supplementation. https://www.dietaryguidelines.gov/food-sources-calcium  Reminder to schedule a routine skin check with dermatology, you can call [phone] to schedule an appointment.    Follow-up: 10/9/2025 at 9:30 AM (telephone)    It was great speaking with you today.  I value your experience and would be very thankful for your time in providing feedback in our clinic survey. In the next few days, you may receive an email or text message from [employer]) with a link to a survey related to your  clinical pharmacist.\"     To schedule another MT appointment, please call the clinic directly or you may call the MT scheduling line at [phone] or toll-free at [phone].     My Clinical Pharmacist's contact information:                                                      Please feel free to contact me with any questions or concerns you have.      Gerald Sebastian, PharmD, BCPS  [facility] Pharmacist   [facility] " Gastroenterology  Phone: [phone]

## 2025-04-27 ENCOUNTER — HEALTH MAINTENANCE LETTER (OUTPATIENT)
Age: 30
End: 2025-04-27

## 2025-04-29 ENCOUNTER — LAB (OUTPATIENT)
Dept: LAB | Facility: CLINIC | Age: 30
End: 2025-04-29
Payer: COMMERCIAL

## 2025-04-29 DIAGNOSIS — K50.10 CROHN'S DISEASE OF COLON WITHOUT COMPLICATION (H): ICD-10-CM

## 2025-04-29 LAB
ALBUMIN SERPL BCG-MCNC: 4.6 G/DL (ref 3.5–5.2)
ALP SERPL-CCNC: 86 U/L (ref 40–150)
ALT SERPL W P-5'-P-CCNC: 19 U/L (ref 0–50)
ANION GAP SERPL CALCULATED.3IONS-SCNC: 10 MMOL/L (ref 7–15)
AST SERPL W P-5'-P-CCNC: 23 U/L (ref 0–45)
BASOPHILS # BLD AUTO: 0 10E3/UL (ref 0–0.2)
BASOPHILS NFR BLD AUTO: 0 %
BILIRUB DIRECT SERPL-MCNC: 0.16 MG/DL (ref 0–0.3)
BILIRUB SERPL-MCNC: 0.5 MG/DL
BUN SERPL-MCNC: 8.2 MG/DL (ref 6–20)
CALCIUM SERPL-MCNC: 9.5 MG/DL (ref 8.8–10.4)
CHLORIDE SERPL-SCNC: 106 MMOL/L (ref 98–107)
CREAT SERPL-MCNC: 0.68 MG/DL (ref 0.51–0.95)
CRP SERPL-MCNC: <3 MG/L
EGFRCR SERPLBLD CKD-EPI 2021: >90 ML/MIN/1.73M2
EOSINOPHIL # BLD AUTO: 0.1 10E3/UL (ref 0–0.7)
EOSINOPHIL NFR BLD AUTO: 1 %
ERYTHROCYTE [DISTWIDTH] IN BLOOD BY AUTOMATED COUNT: 14 % (ref 10–15)
GLUCOSE SERPL-MCNC: 176 MG/DL (ref 70–99)
HCO3 SERPL-SCNC: 22 MMOL/L (ref 22–29)
HCT VFR BLD AUTO: 39.9 % (ref 35–47)
HGB BLD-MCNC: 13.8 G/DL (ref 11.7–15.7)
IMM GRANULOCYTES # BLD: 0 10E3/UL
IMM GRANULOCYTES NFR BLD: 0 %
LYMPHOCYTES # BLD AUTO: 2.2 10E3/UL (ref 0.8–5.3)
LYMPHOCYTES NFR BLD AUTO: 35 %
MCH RBC QN AUTO: 29.5 PG (ref 26.5–33)
MCHC RBC AUTO-ENTMCNC: 34.6 G/DL (ref 31.5–36.5)
MCV RBC AUTO: 85 FL (ref 78–100)
MONOCYTES # BLD AUTO: 0.3 10E3/UL (ref 0–1.3)
MONOCYTES NFR BLD AUTO: 5 %
NEUTROPHILS # BLD AUTO: 3.8 10E3/UL (ref 1.6–8.3)
NEUTROPHILS NFR BLD AUTO: 59 %
NRBC # BLD AUTO: 0 10E3/UL
NRBC BLD AUTO-RTO: 0 /100
PLATELET # BLD AUTO: 277 10E3/UL (ref 150–450)
POTASSIUM SERPL-SCNC: 4 MMOL/L (ref 3.4–5.3)
PROT SERPL-MCNC: 7.2 G/DL (ref 6.4–8.3)
RBC # BLD AUTO: 4.68 10E6/UL (ref 3.8–5.2)
SODIUM SERPL-SCNC: 138 MMOL/L (ref 135–145)
WBC # BLD AUTO: 6.4 10E3/UL (ref 4–11)

## 2025-04-29 PROCEDURE — 85025 COMPLETE CBC W/AUTO DIFF WBC: CPT | Performed by: PATHOLOGY

## 2025-04-29 PROCEDURE — 80053 COMPREHEN METABOLIC PANEL: CPT | Performed by: PATHOLOGY

## 2025-04-29 PROCEDURE — 82248 BILIRUBIN DIRECT: CPT | Performed by: PATHOLOGY

## 2025-04-29 PROCEDURE — 36415 COLL VENOUS BLD VENIPUNCTURE: CPT | Performed by: PATHOLOGY

## 2025-04-29 PROCEDURE — 86140 C-REACTIVE PROTEIN: CPT | Performed by: PATHOLOGY

## 2025-04-30 LAB

## 2025-04-30 PROCEDURE — 83993 ASSAY FOR CALPROTECTIN FECAL: CPT | Performed by: INTERNAL MEDICINE

## 2025-04-30 PROCEDURE — 99000 SPECIMEN HANDLING OFFICE-LAB: CPT | Performed by: PATHOLOGY

## 2025-04-30 PROCEDURE — 87507 IADNA-DNA/RNA PROBE TQ 12-25: CPT | Performed by: INTERNAL MEDICINE

## 2025-05-14 ENCOUNTER — VIRTUAL VISIT (OUTPATIENT)
Dept: GASTROENTEROLOGY | Facility: CLINIC | Age: 30
End: 2025-05-14
Attending: INTERNAL MEDICINE
Payer: COMMERCIAL

## 2025-05-14 VITALS — HEIGHT: 67 IN | BODY MASS INDEX: 25.9 KG/M2 | WEIGHT: 165 LBS

## 2025-05-14 DIAGNOSIS — K50.10 CROHN'S DISEASE OF COLON WITHOUT COMPLICATION (H): Primary | ICD-10-CM

## 2025-05-14 PROCEDURE — 1126F AMNT PAIN NOTED NONE PRSNT: CPT | Mod: 95 | Performed by: PHYSICIAN ASSISTANT

## 2025-05-14 PROCEDURE — 98006 SYNCH AUDIO-VIDEO EST MOD 30: CPT | Performed by: PHYSICIAN ASSISTANT

## 2025-05-14 ASSESSMENT — PAIN SCALES - GENERAL: PAINLEVEL_OUTOF10: NO PAIN (0)

## 2025-05-14 NOTE — NURSING NOTE
Current patient location: Patient declined to provide     Is the patient currently in the state of [place]? YES    Visit mode: VIDEO    If the visit is dropped, the patient can be reconnected by:VIDEO VISIT: Text to cell phone:   Telephone Information:   Mobile [phone]       Will anyone else be joining the visit? NO  (If patient encounters technical issues they should call [phone])    Are changes needed to the allergy or medication list? No    Are refills needed on medications prescribed by this physician? NO    Rooming Documentation:  Questionnaire(s) completed    Reason for visit: RECHECK (IBD)    Daysi GUPTAF

## 2025-05-14 NOTE — PATIENT INSTRUCTIONS
It was a pleasure taking care of you today.  I've included a brief summary of our discussion and care plan from today's visit below.  Please review this information with your primary care provider.  ______________________________________________________________________    My recommendations are summarized as follows:    -- Continue stelara every 8 weeks  -- message our team if you have waning effect before next injection  -- Labs every 3 months   -- Next endoscopic assessment: pending the above  -- Patient with IBD we recommend supplementation vitamin D 1000 units daily and calcium 500 mg twice daily.  -- No NSAIDs (ibuprofen, or anything containing ibuprofen)    For additional resources about inflammatory bowel disease visit http://www.crohnscolitisfoundation.org/    To learn more about Diet and Nutrition in the setting of IBD, check out some of these resources:  https://www.crohnscolitisfoundation.org/diet-and-nutrition/what-should-i-eat  https://www.nimbal.org/ (mSCD)  https://ntforibd.org/ (SCD, mSCD, Autoimmune protocol, IBD-AID, CDED diets with inclusion/exclusion charts)  https://Easydiagnosis.org/ (mediterranean diet)  https://www.[facility].edu/nutrition/ibd/ibdaid/ (IBD-AID)           Return to GI Clinic in 6 months to review your progress.    ______________________________________________________________________    How do I schedule labs, imaging studies, or procedures that were ordered in clinic today?     Labs: To schedule lab appointment at the [facility], use my chart or call [phone]. If you have a [place] lab closer to home where you are regularly seen you can give them a call.     Procedures: If a colonoscopy, upper endoscopy, breath test, esophageal manometry, or pH impedence was ordered today, our endoscopy team will call you to schedule this. If you have not heard from our endoscopy team within a week, please call [phone] to schedule.     Imaging Studies: If you were  scheduled for a CT scan, X-ray, MRI, ultrasound, HIDA scan or other imaging study, please call [phone] to have this scheduled.     Referral: If a referral to another specialty was ordered, expect a phone call or follow instructions above. If you have not heard from anyone regarding your referral in a week, please call our clinic to check the status.     Who do I call with any questions after my visit?  Please be in touch if there are any further questions that arise following today's visit.  There are multiple ways to contact your gastroenterology care team.      During business hours, you may reach a Gastroenterology nurse at [phone]    To schedule or reschedule an appointment, please call [phone].     You can always send a secure message through [employer].  [employer] messages are answered by your nurse or doctor typically within 24 hours.  Please allow extra time on weekends and holidays.      For urgent/emergent questions after business hours, you may reach the on-call GI Fellow by contacting the [facility]  at [phone].     How will I get the results of any tests ordered?    You will receive all of your results.  If you have signed up for [employer], any tests ordered at your visit will be available to you after your physician reviews them.  Typically this takes 1-2 weeks.  If there are urgent results that require a change in your care plan, your physician or nurse will call you to discuss the next steps.      What is Picanova?  Picanova is a secure way for you to access all of your healthcare records from the [facility].  It is a web based computer program, so you can sign on to it from any location.  It also allows you to send secure messages to your care team.  I recommend signing up for Picanova access if you have not already done so and are comfortable with using a computer.         Sincerely,    Lan Trent PA-C  [facility]  Division of  Gastroenterology

## 2025-05-14 NOTE — LETTER
5/14/2025      Arleen Owusu  [address]  [facility] [address]      Dear Colleague,    Thank you for referring your patient, Arleen Owusu, to the [facility] [place]. Please see a copy of my visit note below.    Virtual Visit Details    Type of service:  Video Visit     Originating Location (pt. Location): Home    Distant Location (provider location):  Off-site  Platform used for Video Visit: [facility]    IBD CLINIC VISIT  CC/REFERRING MD:  Luis Alberto Jacobs  REASON FOR CONSULTATION: follow up CD    ASSESSMENT/PLAN:    Crohn's colitis -   Current medication: stelara every 8 week  Current clinical disease activity: HBI 0   Last endoscopic disease activity: Colonoscopy 2/2024 SES = 0    Some concern for a waning effect (2 weeks before due for injection).  This has only happened with her last injection and if the setting of positive norovirus again.  We will see if she is symptomatic around this same time to suggest waning and if that is the case we will move forward with a colonoscopy. If there is evidence of active disease, I would then recommend changing to an IL23i.   -continue Stelara q 8 weeks  --Provide symptom update if there is a waning effect again with next dose, if so proceed with colonoscopy  -continue azathioprine 100 mg (given by rheum for IBD associated arthropathy)   --Labs to include CBC, LFTs, CRP every 3 months.    2. IBD associated arthropathy - CD in remission. Continue azathioprine and follow response    3. Constipation - miralax PRN. If taking ondansetron regularly this can exacerbate constipation. Counseled to be aware and take miralax regularly if needed    5. Prior history of recurrent c diff. No recent issues. Did have abx recently for BV and UTI (nitrofurantoin and flagyl respectively). If she develops watery diarrhea we will recheck c diff.    6. B12 deficiency - B12 shots. Recheck B12 is within range 11/2024.    IBD HISTORY  Age at  diagnosis: 22/23 (2019)  Extent of disease: right sided colitis  Disease phenotype: inflammatory  Melly-anal disease: none  Prior IBD surgeries: none  Prior IBD Medications:  Prednisone taper 2/2020  Entocort  Apriso    DRUG MONITORING  TPMT enzyme activity: 24 (WNL)     6-TGN/6-MMPN levels: NA     Biologic concentration: NA    DISEASE ASSESSMENT  Labs  Recent Labs   Lab Test 04/29/25  1807 03/02/24  1636 01/02/24  1611 10/02/23  0748 08/26/22  1318 02/04/22  1149   CRP  --   --   --   --   --  0.3   SED  --   --  10 8   < > 7   HGB 13.8   < > 13.1 12.4   < > 12.5    < > = values in this interval not displayed.     Endoscopy:   -Colonoscopy 2/14/22 - [facility] - 1. Mild erythema at appendiceal orifice. Otherwise totally normal colon  -Last endoscopic disease activity: Colonoscopy 4/2023 - [facility] - 5. Alphous ulcers, erythema and loss of vasculature cecum and proximal ascending  Enterography: MRE normal 3/3/22  Fecal filiberto >120 prior to Stelara start, now < 5 10/2023  C diff: negative 10/30/22  -July 2020, Oct 2020, Dec 2020 - then did 6 weeks vanco and none since       sIBDQ:   IBDQ Score Date IBDQ - Total Score  (Higher score better)   11/14/2024   7:12 AM 58   8/22/2023   9:48 AM 60   11/21/2022  10:29 PM 56   5/19/2022   1:57 PM 61   1/25/2022  12:44 PM 61       IBD Health Care Maintenance:    Follows with [facility]    Cancer Screening:  Colon cancer screening:  Given colonic disease, recommend patient undergo regular dysplasia surveillance   Next dysplasia screening is recommended 2027.    Skin cancer screening: Annual visual exam of skin by dermatologist since patient is immunocompromised    Depression Screening:  -- Over the last month, have you felt down, depressed, or hopeless? no  -- Over the last month, have you felt little interest or pleasure doing things? no    Misc:  -- Avoid tobacco use  -- Avoid NSAIDs as there is potentially a 25% chance of causing an IBD flare    Return to clinic in 6 months with Dr. Jacobs and 12  months with me.    Thank you for this consultation.  It was a pleasure to participate in the care of this patient; please contact us with any further questions.     Lan Trent PA-C  Division of Gastroenterology, Hepatology and Nutrition  [facility]     HPI:   Currently, here for follow up.     Last month had an episode of increased BM, mucus and abdominal pain which started about 2 weeks before stelara injection done. Felt better after receiving her Stelara in about 2 weeks. Norovirus was positive again (has been persistently positive for the last 10 months).  She is due for her next injection on 6/12/25.    Currently having 1-2 stools per day, formed. No blood or urgency. No nighttime stools. No abdominal pain. No obstructive symptoms. No rashes, mouth sores or eye pain. She continues to use miralax at least once a week.    Has had IBD associated arthropathy. Saw [facility] who felt symptoms c/w with this. Started on sulfasalazine but had side effects. Then started on azathioprine and is on 100 mg daily. She does feel joint symptoms are non existent for the last 5 months.     Weight stable.    ROS:    No fevers or chills  No weight loss  No blurry vision, double vision or change in vision  No sore throat  No lymphadenopathy  No headache, paraesthesias, or weakness in a limb  No shortness of breath or wheezing  No chest pain or pressure  No arthralgias or myalgias  No rashes or skin changes  No odynophagia or dysphagia  No BRBPR, hematochezia, melena  No dysuria, frequency or urgency  No hot/cold intolerance or polyria  No anxiety or depression    Extra intestinal manifestations of IBD:  No uveitis/episcleritis  No aphthous ulcers   No arthritis   No erythema nodosum/pyoderma gangrenosum.     PERTINENT PAST MEDICAL HISTORY:  Past Medical History:   Diagnosis Date     Autoimmune disease 2019    Ulcerative colitis     Depressive disorder 8/1/2014     Varicella 1998    As a child       PREVIOUS  SURGERIES:  Past Surgical History:   Procedure Laterality Date     COLONOSCOPY N/A 2/14/2022    Procedure: COLONOSCOPY, WITH  BIOPSY;  Surgeon: Yancy Jacobs MD;  [place] [facility] [place]     COLONOSCOPY N/A 4/3/2023    Procedure: COLONOSCOPY, WITH BIOPSY;  Surgeon: Yancy Jacobs MD;  [place] [facility] [place]     COLONOSCOPY N/A 2/19/2024    Procedure: COLONOSCOPY, WITH POLYPECTOMY AND BIOPSY, WITH CLIP;  Surgeon: Yancy Jacobs MD;  [place] [facility] [place]       PREVIOUS ENDOSCOPY:  Results for orders placed or performed during the hospital encounter of 02/19/24   COLONOSCOPY    Collection Time: 02/19/24  3:17 PM   Result Value Ref Range    COLONOSCOPY       [facility]  [address] [place] [address], [place] [address] [phone]     Endoscopy Department  _______________________________________________________________________________  Patient Name: Arleen Owusu              Procedure Date: 2/19/2024 3:17 PM  MRN: [id number]                       Account Number: [id number]  YOB: 1995             Admit Type: Outpatient  Age: 28                               [place] [facility] [place]  Gender: Female                        Note Status: Finalized  Attending MD: YANCY JACOBS MD,   Total Sedation Time:   _______________________________________________________________________________     Procedure:             Colonoscopy  Indications:           Disease activity assessment of Crohn's disease of the                          colon, Assess therapeutic response to therapy of                          Crohn's disease of the colon  Providers:             YANCY JACOBS MD, Cheri Arroyo, SONAL,                           Hannah Kothari, CRNA  Referring MD:          JOANNA OHARA  Requesting Provider:   JOANNA OHARA  Medicines:             Monitored Anesthesia Care  Complications:         No immediate  complications.  _______________________________________________________________________________  Procedure:             Pre-Anesthesia Assessment:                         - See EPIC H and P note                         After obtaining informed consent, the colonoscope was                          passed under direct vision. Throughout the procedure,                          the patient's blood pressure, pulse, and oxygen                          saturations were monitored continuously. The                          Colonoscope was introduced through the anus and                          advanced to the terminal ileum, with identification of                          the appendiceal orifice and IC valve. The colonoscopy                          was performed without difficulty. The patie nt                          tolerated the procedure well. The quality of the bowel                          preparation was evaluated using the BBPS (Bedford Bowel                          Preparation Scale) with scores of: Right Colon = 3,                          Transverse Colon = 3 and Left Colon = 3 (entire mucosa                          seen well with no residual staining, small fragments                          of stool or opaque liquid). The total BBPS score                          equals 9.                                                                                   Findings:       Skin tags were found on perianal exam.       The Simple Endoscopic Score for Crohn's Disease was determined based on        the endoscopic appearance of the mucosa in the following segments:       - Ileum: Findings include no ulcers present, no ulcerated surfaces, no        affected surfaces and no narrowings. Segment score: 0.       - Right Colon: Findings include no ulcers present, no ulcer ated        surfaces, no affected surfaces and no narrowings. Segment score: 0.       - Transverse Colon: Findings include no ulcers present, no  ulcerated        surfaces, no affected surfaces and no narrowings. Segment score: 0.       - Left Colon: Findings include no ulcers present, no ulcerated surfaces,        no affected surfaces and no narrowings. Segment score: 0.       - Rectum: Findings include no ulcers present, no ulcerated surfaces, no        affected surfaces and no narrowings. Segment score: 0.       - Total SES-CD aggregate score: 0. Biopsies were taken with a cold        forceps for histology (right colon, left colon and rectum and placed in        separate jars).       A 5 mm polyp was found in the cecum adjacent to the appendiceal orifice.        The polyp was flat. The polyp was removed with a cold snare. Resection        and retrieval were complete. To prevent bleeding after the polypectomy,        one hemostatic clip was successfully placed (MR conditional). Clip         : [employer]. There was no bleeding during, or at the end, of        the procedure.                                                                                   Impression:            - Perianal skin tags found on perianal exam.                         - Simple Endoscopic Score for Crohn's Disease: 0,                          mucosal inflammatory changes secondary to Crohn's                          disease, in remission. Biopsied.                         - One 5 mm polyp in the cecum adjacent to the                          appendiceal orifice, removed with a cold snare.                          Resected and retrieved. Clip (MR conditional) was                          placed.                         - Due to small caliber rectal vault no retroflexion in                          the rectrum was performed. Right sided colon                          retroflexion was performed.                         She has endoscopic healing on her current therapies.  Recommendatio n:        - Discharge patient to home (with escort).                         - Resume  previous diet.                         - Continue present medications.                         - Await pathology results.                         - Repeat colonoscopy in 3 years for surveillance based                          on pathology results.                         - Return to GI clinic as previously scheduled.                                                                                     Electronically Signed by: Dr. Luis Alberto Schmitz  ___________________________  LUIS ALBERTO SCHMITZ MD  2/19/2024 4:20:28 PM  I was physically present for the entire viewing portion of the exam.  __________________________  Signature of teaching physician  LUIS ALBERTO SCHMITZ MD  Number of Addenda: 0    Note Initiated On: 2/19/2024 3:17 PM  Scope In: 3:34:46 PM  Scope Out: 3:55:35 PM     ]    ALLERGIES:     Allergies   Allergen Reactions     Ibuprofen      Pt has an autoimmune disease it causes a negative colon reaction , can tolerate tylenol     Levonorgestrel-Ethinyl Estrad Headache, Hives and Itching     Seasonale Headache, Hives and Itching     Latex Rash       PERTINENT MEDICATIONS:    Current Outpatient Medications:      azaTHIOprine (IMURAN) 50 MG tablet, Take 50 mg by mouth. Take 2 tablets once daily, Disp: , Rfl:      cyanocobalamin (CYANOCOBALAMIN) 1000 MCG/ML injection, Inject 1 mL (1,000 mcg) into the muscle every 30 days, Disp: 1 mL, Rfl: 11     EPINEPHrine (ANY BX GENERIC EQUIV) 0.3 MG/0.3ML injection 2-pack, , Disp: , Rfl:      fluticasone (FLONASE) 50 MCG/ACT nasal spray, Spray 1-2 sprays into both nostrils 2 times daily as needed, Disp: , Rfl:      NEEDLES, ANY SIZE, Please use 27 gauge 1 inch needle to inject subcutaneous stelara as directed every 8 weeks., Disp: 10 each, Rfl: 1     ondansetron (ZOFRAN ODT) 4 MG ODT tab, Take 1 tablet (4 mg) by mouth every 8 hours as needed for nausea., Disp: 30 tablet, Rfl: 3     sertraline (ZOLOFT) 100 MG tablet, Take 1 tablet (100 mg) by mouth 2 times daily., Disp: 180  "tablet, Rfl: 3     Syringe Luer Slip (BD PLASTIPAK SYRINGE) 3 ML MISC, 1 each once as directed. Use as directed to administer Stelara every 8 weeks., Disp: 3 each, Rfl: 4     Syringe/Needle, Disp, 20G X 1-1/2\" 3 ML MISC, Use as directed with Stelara every 8 weeks., Disp: 10 each, Rfl: 1     ustekinumab (STELARA) 45 MG/0.5ML SOLN, Inject 90mg (1mL) subcutaneously every 8 weeks, Disp: 1 mL, Rfl: 2    Current Facility-Administered Medications:      medroxyPROGESTERone (DEPO-PROVERA) injection 150 mg, 150 mg, Intramuscular, Q90 Days, Kaya Perez CNM    SOCIAL HISTORY:  Social History     Socioeconomic History     Marital status: Single     Spouse name: Not on file     Number of children: Not on file     Years of education: Not on file     Highest education level: Not on file   Occupational History     Not on file   Tobacco Use     Smoking status: Never     Passive exposure: Never     Smokeless tobacco: Never   Vaping Use     Vaping status: Never Used   Substance and Sexual Activity     Alcohol use: Not Currently     Drug use: Never     Sexual activity: Not on file   Other Topics Concern     Not on file   Social History Narrative     Not on file     Social Drivers of Health     Financial Resource Strain: Unknown (3/6/2022)    Received [facility] [place] [facility] [place]    Financial Resource Strain      Overall Financial Strain: 99      Skipped Doctor's Visit: 3      Skipped Medication due to cost: 3      Utility Shut-offs: 3   Food Insecurity: Not on file   Transportation Needs: Not on file   Physical Activity: Not on file   Stress: Not on file   Social Connections: Not on file   Interpersonal Safety: Low Risk  (12/18/2024)    Interpersonal Safety      Do you feel physically and emotionally safe where you currently live?: Yes      Within the past 12 months, have you been hit, slapped, kicked or otherwise physically hurt by someone?: No      Within the past 12 months, have you been humiliated or " "emotionally abused in other ways by your partner or ex-partner?: No   Housing Stability: Not on file       FAMILY HISTORY:  Family History   Problem Relation Age of Onset     Diabetes Mother      Hypertension Mother      Depression Brother      Anxiety Disorder Sister        Past/family/social history reviewed and no changes    PHYSICAL EXAMINATION:  Constitutional: aaox3, cooperative, pleasant, not dyspneic/diaphoretic, no acute distress  Vitals reviewed: Ht 1.702 m (5' 7\")   Wt 74.8 kg (165 lb)   BMI 25.84 kg/m    Wt:   Wt Readings from Last 2 Encounters:   05/14/25 74.8 kg (165 lb)   04/10/25 77.1 kg (170 lb)      Eyes: Sclera anicteric/injected  Respiratory: Unlabored breathing  Skin: no jaundice  Psych: Normal affect      PERTINENT STUDIES:  Most recent CBC:  Recent Labs   Lab Test 04/29/25  1807 03/18/25  1245   WBC 6.4 6.5   HGB 13.8 13.6   HCT 39.9 40.5    284     Most recent hepatic panel:  Recent Labs   Lab Test 04/29/25  1807 03/18/25  1245   ALT 19 17   AST 23 19     Most recent creatinine:  Recent Labs   Lab Test 04/29/25  1807 03/02/24  2335   CR 0.68 0.74             Again, thank you for allowing me to participate in the care of your patient.        Sincerely,        Lan Trent PA-C    Electronically signed"

## 2025-05-14 NOTE — PROGRESS NOTES
Virtual Visit Details    Type of service:  Video Visit     Originating Location (pt. Location): Home    Distant Location (provider location):  Off-site  Platform used for Video Visit: [facility]    IBD CLINIC VISIT  CC/REFERRING MD:  Luis Alberto Jacobs  REASON FOR CONSULTATION: follow up CD    ASSESSMENT/PLAN:    Crohn's colitis -   Current medication: stelara every 8 week  Current clinical disease activity: HBI 0   Last endoscopic disease activity: Colonoscopy 2/2024 SES = 0    Some concern for a waning effect (2 weeks before due for injection).  This has only happened with her last injection and if the setting of positive norovirus again.  We will see if she is symptomatic around this same time to suggest waning and if that is the case we will move forward with a colonoscopy. If there is evidence of active disease, I would then recommend changing to an IL23i.   -continue Stelara q 8 weeks  --Provide symptom update if there is a waning effect again with next dose, if so proceed with colonoscopy  -continue azathioprine 100 mg (given by rheum for IBD associated arthropathy)   --Labs to include CBC, LFTs, CRP every 3 months.    2. IBD associated arthropathy - CD in remission. Continue azathioprine and follow response    3. Constipation - miralax PRN. If taking ondansetron regularly this can exacerbate constipation. Counseled to be aware and take miralax regularly if needed    5. Prior history of recurrent c diff. No recent issues. Did have abx recently for BV and UTI (nitrofurantoin and flagyl respectively). If she develops watery diarrhea we will recheck c diff.    6. B12 deficiency - B12 shots. Recheck B12 is within range 11/2024.    IBD HISTORY  Age at diagnosis: 22/23 (2019)  Extent of disease: right sided colitis  Disease phenotype: inflammatory  Melly-anal disease: none  Prior IBD surgeries: none  Prior IBD Medications:  Prednisone taper 2/2020  Entocort  Apriso    DRUG MONITORING  TPMT enzyme activity: 24  (WNL)     6-TGN/6-MMPN levels: NA     Biologic concentration: NA    DISEASE ASSESSMENT  Labs  Recent Labs   Lab Test 04/29/25  1807 03/02/24  1636 01/02/24  1611 10/02/23  0748 08/26/22  1318 02/04/22  1149   CRP  --   --   --   --   --  0.3   SED  --   --  10 8   < > 7   HGB 13.8   < > 13.1 12.4   < > 12.5    < > = values in this interval not displayed.     Endoscopy:   -Colonoscopy 2/14/22 - [facility] - 1. Mild erythema at appendiceal orifice. Otherwise totally normal colon  -Last endoscopic disease activity: Colonoscopy 4/2023 - [facility] - 5. Alphous ulcers, erythema and loss of vasculature cecum and proximal ascending  Enterography: MRE normal 3/3/22  Fecal filiberto >120 prior to Stelara start, now < 5 10/2023  C diff: negative 10/30/22  -July 2020, Oct 2020, Dec 2020 - then did 6 weeks vanco and none since       sIBDQ:   IBDQ Score Date IBDQ - Total Score  (Higher score better)   11/14/2024   7:12 AM 58   8/22/2023   9:48 AM 60   11/21/2022  10:29 PM 56   5/19/2022   1:57 PM 61   1/25/2022  12:44 PM 61       IBD Health Care Maintenance:    Follows with MTM    Cancer Screening:  Colon cancer screening:  Given colonic disease, recommend patient undergo regular dysplasia surveillance   Next dysplasia screening is recommended 2027.    Skin cancer screening: Annual visual exam of skin by dermatologist since patient is immunocompromised    Depression Screening:  -- Over the last month, have you felt down, depressed, or hopeless? no  -- Over the last month, have you felt little interest or pleasure doing things? no    Misc:  -- Avoid tobacco use  -- Avoid NSAIDs as there is potentially a 25% chance of causing an IBD flare    Return to clinic in 6 months with Dr. Jacobs and 12 months with me.    Thank you for this consultation.  It was a pleasure to participate in the care of this patient; please contact us with any further questions.     Lan Trent PA-C  Division of Gastroenterology, Hepatology and Nutrition  [facility]  [place]     HPI:   Currently, here for follow up.     Last month had an episode of increased BM, mucus and abdominal pain which started about 2 weeks before stelara injection done. Felt better after receiving her Stelara in about 2 weeks. Norovirus was positive again (has been persistently positive for the last 10 months).  She is due for her next injection on 6/12/25.    Currently having 1-2 stools per day, formed. No blood or urgency. No nighttime stools. No abdominal pain. No obstructive symptoms. No rashes, mouth sores or eye pain. She continues to use miralax at least once a week.    Has had IBD associated arthropathy. Saw [facility]um who felt symptoms c/w with this. Started on sulfasalazine but had side effects. Then started on azathioprine and is on 100 mg daily. She does feel joint symptoms are non existent for the last 5 months.     Weight stable.    ROS:    No fevers or chills  No weight loss  No blurry vision, double vision or change in vision  No sore throat  No lymphadenopathy  No headache, paraesthesias, or weakness in a limb  No shortness of breath or wheezing  No chest pain or pressure  No arthralgias or myalgias  No rashes or skin changes  No odynophagia or dysphagia  No BRBPR, hematochezia, melena  No dysuria, frequency or urgency  No hot/cold intolerance or polyria  No anxiety or depression    Extra intestinal manifestations of IBD:  No uveitis/episcleritis  No aphthous ulcers   No arthritis   No erythema nodosum/pyoderma gangrenosum.     PERTINENT PAST MEDICAL HISTORY:  Past Medical History:   Diagnosis Date    Autoimmune disease 2019    Ulcerative colitis    Depressive disorder 8/1/2014    Varicella 1998    As a child       PREVIOUS SURGERIES:  Past Surgical History:   Procedure Laterality Date    COLONOSCOPY N/A 2/14/2022    Procedure: COLONOSCOPY, WITH  BIOPSY;  Surgeon: Luis Alberto Jacobs MD;  Location: [facility] OR    COLONOSCOPY N/A 4/3/2023    Procedure: COLONOSCOPY, WITH BIOPSY;   Surgeon: Yancy Jacobs MD;  Location: [facility] OR    COLONOSCOPY N/A 2/19/2024    Procedure: COLONOSCOPY, WITH POLYPECTOMY AND BIOPSY, WITH CLIP;  Surgeon: Yancy Jacobs MD;  Location: [facility] OR       PREVIOUS ENDOSCOPY:  Results for orders placed or performed during the hospital encounter of 02/19/24   COLONOSCOPY    Collection Time: 02/19/24  3:17 PM   Result Value Ref Range    COLONOSCOPY       [facility]  [address] [place] [address]., [place] [address] [phone]     Endoscopy Department  _______________________________________________________________________________  Patient Name: Arleen Owusu              Procedure Date: 2/19/2024 3:17 PM  MRN: [id number]                       Account Number: [id number]  YOB: 1995             Admit Type: Outpatient  Age: 28                               Room: [facility] PROCEDURE ROOM 02  Gender: Female                        Note Status: Finalized  Attending MD: YANCY JACOBS MD,   Total Sedation Time:   _______________________________________________________________________________     Procedure:             Colonoscopy  Indications:           Disease activity assessment of Crohn's disease of the                          colon, Assess therapeutic response to therapy of                          Crohn's disease of the colon  Providers:             YANCY JACOBS MD, Cheri Arroyo, SONAL,                           Hannah Kothari, CRNA  Referring MD:          JOANNA OHARA  Requesting Provider:   JOANNA OHARA  Medicines:             Monitored Anesthesia Care  Complications:         No immediate complications.  _______________________________________________________________________________  Procedure:             Pre-Anesthesia Assessment:                         - See EPIC H and P note                         After obtaining informed consent, the colonoscope was                          passed under direct  vision. Throughout the procedure,                          the patient's blood pressure, pulse, and oxygen                          saturations were monitored continuously. The                          Colonoscope was introduced through the anus and                          advanced to the terminal ileum, with identification of                          the appendiceal orifice and IC valve. The colonoscopy                          was performed without difficulty. The patie nt                          tolerated the procedure well. The quality of the bowel                          preparation was evaluated using the BBPS (Orange Bowel                          Preparation Scale) with scores of: Right Colon = 3,                          Transverse Colon = 3 and Left Colon = 3 (entire mucosa                          seen well with no residual staining, small fragments                          of stool or opaque liquid). The total BBPS score                          equals 9.                                                                                   Findings:       Skin tags were found on perianal exam.       The Simple Endoscopic Score for Crohn's Disease was determined based on        the endoscopic appearance of the mucosa in the following segments:       - Ileum: Findings include no ulcers present, no ulcerated surfaces, no        affected surfaces and no narrowings. Segment score: 0.       - Right Colon: Findings include no ulcers present, no ulcer ated        surfaces, no affected surfaces and no narrowings. Segment score: 0.       - Transverse Colon: Findings include no ulcers present, no ulcerated        surfaces, no affected surfaces and no narrowings. Segment score: 0.       - Left Colon: Findings include no ulcers present, no ulcerated surfaces,        no affected surfaces and no narrowings. Segment score: 0.       - Rectum: Findings include no ulcers present, no ulcerated surfaces, no        affected  surfaces and no narrowings. Segment score: 0.       - Total SES-CD aggregate score: 0. Biopsies were taken with a cold        forceps for histology (right colon, left colon and rectum and placed in        separate jars).       A 5 mm polyp was found in the cecum adjacent to the appendiceal orifice.        The polyp was flat. The polyp was removed with a cold snare. Resection        and retrieval were complete. To prevent bleeding after the polypectomy,        one hemostatic clip was successfully placed (MR conditional). Clip         : Polatis. There was no bleeding during, or at the end, of        the procedure.                                                                                   Impression:            - Perianal skin tags found on perianal exam.                         - Simple Endoscopic Score for Crohn's Disease: 0,                          mucosal inflammatory changes secondary to Crohn's                          disease, in remission. Biopsied.                         - One 5 mm polyp in the cecum adjacent to the                          appendiceal orifice, removed with a cold snare.                          Resected and retrieved. Clip (MR conditional) was                          placed.                         - Due to small caliber rectal vault no retroflexion in                          the rectrum was performed. Right sided colon                          retroflexion was performed.                         She has endoscopic healing on her current therapies.  Recommendatio n:        - Discharge patient to home (with escort).                         - Resume previous diet.                         - Continue present medications.                         - Await pathology results.                         - Repeat colonoscopy in 3 years for surveillance based                          on pathology results.                         - Return to GI clinic as previously scheduled.              "                                                                        Electronically Signed by: Dr. Luis Alberto Schmitz  ___________________________  LUIS ALBERTO SCHMITZ MD  2/19/2024 4:20:28 PM  I was physically present for the entire viewing portion of the exam.  __________________________  Signature of teaching physician  LUIS ALBERTO SCHMITZ MD  Number of Addenda: 0    Note Initiated On: 2/19/2024 3:17 PM  Scope In: 3:34:46 PM  Scope Out: 3:55:35 PM     ]    ALLERGIES:     Allergies   Allergen Reactions    Ibuprofen      Pt has an autoimmune disease it causes a negative colon reaction , can tolerate tylenol    Levonorgestrel-Ethinyl Estrad Headache, Hives and Itching    Seasonale Headache, Hives and Itching    Latex Rash       PERTINENT MEDICATIONS:    Current Outpatient Medications:     azaTHIOprine (IMURAN) 50 MG tablet, Take 50 mg by mouth. Take 2 tablets once daily, Disp: , Rfl:     cyanocobalamin (CYANOCOBALAMIN) 1000 MCG/ML injection, Inject 1 mL (1,000 mcg) into the muscle every 30 days, Disp: 1 mL, Rfl: 11    EPINEPHrine (ANY BX GENERIC EQUIV) 0.3 MG/0.3ML injection 2-pack, , Disp: , Rfl:     fluticasone (FLONASE) 50 MCG/ACT nasal spray, Spray 1-2 sprays into both nostrils 2 times daily as needed, Disp: , Rfl:     NEEDLES, ANY SIZE, Please use 27 gauge 1 inch needle to inject subcutaneous stelara as directed every 8 weeks., Disp: 10 each, Rfl: 1    ondansetron (ZOFRAN ODT) 4 MG ODT tab, Take 1 tablet (4 mg) by mouth every 8 hours as needed for nausea., Disp: 30 tablet, Rfl: 3    sertraline (ZOLOFT) 100 MG tablet, Take 1 tablet (100 mg) by mouth 2 times daily., Disp: 180 tablet, Rfl: 3    Syringe Luer Slip (BD PLASTIPAK SYRINGE) 3 ML MISC, 1 each once as directed. Use as directed to administer Stelara every 8 weeks., Disp: 3 each, Rfl: 4    Syringe/Needle, Disp, 20G X 1-1/2\" 3 ML MISC, Use as directed with Stelara every 8 weeks., Disp: 10 each, Rfl: 1    ustekinumab (STELARA) 45 MG/0.5ML SOLN, Inject 90mg " (1mL) subcutaneously every 8 weeks, Disp: 1 mL, Rfl: 2    Current Facility-Administered Medications:     medroxyPROGESTERone (DEPO-PROVERA) injection 150 mg, 150 mg, Intramuscular, Q90 Days, Kaya Perez CNM    SOCIAL HISTORY:  Social History     Socioeconomic History    Marital status: Single     Spouse name: Not on file    Number of children: Not on file    Years of education: Not on file    Highest education level: Not on file   Occupational History    Not on file   Tobacco Use    Smoking status: Never     Passive exposure: Never    Smokeless tobacco: Never   Vaping Use    Vaping status: Never Used   Substance and Sexual Activity    Alcohol use: Not Currently    Drug use: Never    Sexual activity: Not on file   Other Topics Concern    Not on file   Social History Narrative    Not on file     Social Drivers of Health     Financial Resource Strain: Unknown (3/6/2022)    [facility] from [facility] [place] [facility] [place]    Financial Resource Strain     Overall Financial Strain: 99     Skipped Doctor's Visit: 3     Skipped Medication due to cost: 3     Utility Shut-offs: 3   Food Insecurity: Not on file   Transportation Needs: Not on file   Physical Activity: Not on file   Stress: Not on file   Social Connections: Not on file   Interpersonal Safety: Low Risk  (12/18/2024)    Interpersonal Safety     Do you feel physically and emotionally safe where you currently live?: Yes     Within the past 12 months, have you been hit, slapped, kicked or otherwise physically hurt by someone?: No     Within the past 12 months, have you been humiliated or emotionally abused in other ways by your partner or ex-partner?: No   Housing Stability: Not on file       FAMILY HISTORY:  Family History   Problem Relation Age of Onset    Diabetes Mother     Hypertension Mother     Depression Brother     Anxiety Disorder Sister        Past/family/social history reviewed and no changes    PHYSICAL EXAMINATION:  Constitutional:  "aaox3, cooperative, pleasant, not dyspneic/diaphoretic, no acute distress  Vitals reviewed: Ht 1.702 m (5' 7\")   Wt 74.8 kg (165 lb)   BMI 25.84 kg/m    Wt:   Wt Readings from Last 2 Encounters:   05/14/25 74.8 kg (165 lb)   04/10/25 77.1 kg (170 lb)      Eyes: Sclera anicteric/injected  Respiratory: Unlabored breathing  Skin: no jaundice  Psych: Normal affect      PERTINENT STUDIES:  Most recent CBC:  Recent Labs   Lab Test 04/29/25  1807 03/18/25  1245   WBC 6.4 6.5   HGB 13.8 13.6   HCT 39.9 40.5    284     Most recent hepatic panel:  Recent Labs   Lab Test 04/29/25 1807 03/18/25  1245   ALT 19 17   AST 23 19     Most recent creatinine:  Recent Labs   Lab Test 04/29/25 1807 03/02/24  2335   CR 0.68 0.74             "

## 2025-06-10 ENCOUNTER — HOSPITAL ENCOUNTER (OUTPATIENT)
Facility: AMBULATORY SURGERY CENTER | Age: 30
Discharge: HOME | End: 2025-06-10
Attending: INTERNAL MEDICINE | Admitting: INTERNAL MEDICINE
Payer: COMMERCIAL

## 2025-06-10 ENCOUNTER — ANESTHESIA EVENT (OUTPATIENT)
Dept: SURGERY | Facility: AMBULATORY SURGERY CENTER | Age: 30
End: 2025-06-10
Payer: COMMERCIAL

## 2025-06-10 ENCOUNTER — ANESTHESIA (OUTPATIENT)
Dept: SURGERY | Facility: AMBULATORY SURGERY CENTER | Age: 30
End: 2025-06-10
Payer: COMMERCIAL

## 2025-06-10 VITALS
DIASTOLIC BLOOD PRESSURE: 65 MMHG | HEART RATE: 58 BPM | OXYGEN SATURATION: 100 % | TEMPERATURE: 97 F | RESPIRATION RATE: 16 BRPM | SYSTOLIC BLOOD PRESSURE: 100 MMHG | HEIGHT: 68 IN | BODY MASS INDEX: 25.76 KG/M2 | WEIGHT: 170 LBS

## 2025-06-10 VITALS — HEART RATE: 62 BPM

## 2025-06-10 LAB
COLONOSCOPY: NORMAL
HCG UR QL: NEGATIVE
HCG UR QL: NEGATIVE
INTERNAL QC OK POCT: NORMAL
INTERNAL QC OK POCT: NORMAL
POCT KIT EXPIRATION DATE: NORMAL
POCT KIT EXPIRATION DATE: NORMAL
POCT KIT LOT NUMBER: NORMAL
POCT KIT LOT NUMBER: NORMAL

## 2025-06-10 PROCEDURE — 88305 TISSUE EXAM BY PATHOLOGIST: CPT | Mod: 26 | Performed by: PATHOLOGY

## 2025-06-10 PROCEDURE — 81025 URINE PREGNANCY TEST: CPT | Performed by: PATHOLOGY

## 2025-06-10 PROCEDURE — 88305 TISSUE EXAM BY PATHOLOGIST: CPT | Mod: TC | Performed by: INTERNAL MEDICINE

## 2025-06-10 PROCEDURE — 45380 COLONOSCOPY AND BIOPSY: CPT | Performed by: INTERNAL MEDICINE

## 2025-06-10 RX ORDER — NALOXONE HYDROCHLORIDE 0.4 MG/ML
0.2 INJECTION, SOLUTION INTRAMUSCULAR; INTRAVENOUS; SUBCUTANEOUS
Status: DISCONTINUED | OUTPATIENT
Start: 2025-06-10 | End: 2025-06-11 | Stop reason: HOSPADM

## 2025-06-10 RX ORDER — PROPOFOL 10 MG/ML
INJECTION, EMULSION INTRAVENOUS CONTINUOUS PRN
Status: DISCONTINUED | OUTPATIENT
Start: 2025-06-10 | End: 2025-06-10

## 2025-06-10 RX ORDER — ONDANSETRON 2 MG/ML
4 INJECTION INTRAMUSCULAR; INTRAVENOUS EVERY 6 HOURS PRN
Status: DISCONTINUED | OUTPATIENT
Start: 2025-06-10 | End: 2025-06-11 | Stop reason: HOSPADM

## 2025-06-10 RX ORDER — ONDANSETRON 4 MG/1
4 TABLET, ORALLY DISINTEGRATING ORAL EVERY 6 HOURS PRN
Status: DISCONTINUED | OUTPATIENT
Start: 2025-06-10 | End: 2025-06-11 | Stop reason: HOSPADM

## 2025-06-10 RX ORDER — ONDANSETRON 2 MG/ML
4 INJECTION INTRAMUSCULAR; INTRAVENOUS
Status: DISCONTINUED | OUTPATIENT
Start: 2025-06-10 | End: 2025-06-10 | Stop reason: HOSPADM

## 2025-06-10 RX ORDER — SODIUM CHLORIDE, SODIUM LACTATE, POTASSIUM CHLORIDE, CALCIUM CHLORIDE 600; 310; 30; 20 MG/100ML; MG/100ML; MG/100ML; MG/100ML
INJECTION, SOLUTION INTRAVENOUS CONTINUOUS
Status: DISCONTINUED | OUTPATIENT
Start: 2025-06-10 | End: 2025-06-11 | Stop reason: HOSPADM

## 2025-06-10 RX ORDER — LIDOCAINE HYDROCHLORIDE 20 MG/ML
INJECTION, SOLUTION INFILTRATION; PERINEURAL PRN
Status: DISCONTINUED | OUTPATIENT
Start: 2025-06-10 | End: 2025-06-10

## 2025-06-10 RX ORDER — PROPOFOL 10 MG/ML
INJECTION, EMULSION INTRAVENOUS PRN
Status: DISCONTINUED | OUTPATIENT
Start: 2025-06-10 | End: 2025-06-10

## 2025-06-10 RX ORDER — FLUMAZENIL 0.1 MG/ML
0.2 INJECTION, SOLUTION INTRAVENOUS
Status: ACTIVE | OUTPATIENT
Start: 2025-06-10 | End: 2025-06-10

## 2025-06-10 RX ORDER — NALOXONE HYDROCHLORIDE 0.4 MG/ML
0.4 INJECTION, SOLUTION INTRAMUSCULAR; INTRAVENOUS; SUBCUTANEOUS
Status: DISCONTINUED | OUTPATIENT
Start: 2025-06-10 | End: 2025-06-11 | Stop reason: HOSPADM

## 2025-06-10 RX ORDER — PROCHLORPERAZINE MALEATE 10 MG
10 TABLET ORAL EVERY 6 HOURS PRN
Status: DISCONTINUED | OUTPATIENT
Start: 2025-06-10 | End: 2025-06-11 | Stop reason: HOSPADM

## 2025-06-10 RX ORDER — LIDOCAINE 40 MG/G
CREAM TOPICAL
Status: DISCONTINUED | OUTPATIENT
Start: 2025-06-10 | End: 2025-06-10 | Stop reason: HOSPADM

## 2025-06-10 RX ADMIN — PROPOFOL 150 MCG/KG/MIN: 10 INJECTION, EMULSION INTRAVENOUS at 10:10

## 2025-06-10 RX ADMIN — LIDOCAINE HYDROCHLORIDE 50 MG: 20 INJECTION, SOLUTION INFILTRATION; PERINEURAL at 10:10

## 2025-06-10 RX ADMIN — SODIUM CHLORIDE, SODIUM LACTATE, POTASSIUM CHLORIDE, CALCIUM CHLORIDE: 600; 310; 30; 20 INJECTION, SOLUTION INTRAVENOUS at 09:36

## 2025-06-10 RX ADMIN — PROPOFOL 50 MG: 10 INJECTION, EMULSION INTRAVENOUS at 10:10

## 2025-06-10 RX ADMIN — PROPOFOL 30 MG: 10 INJECTION, EMULSION INTRAVENOUS at 10:12

## 2025-06-10 NOTE — ANESTHESIA PREPROCEDURE EVALUATION
Anesthesia Pre-Procedure Evaluation    Patient: Arleen Owusu   MRN: [id number] : 1995          Procedure : Procedure(s):  Colonoscopy, Screening, High Risk         Past Medical History:   Diagnosis Date    Autoimmune disease 2019    Ulcerative colitis    Depressive disorder 2014    Varicella 1998    As a child      Past Surgical History:   Procedure Laterality Date    COLONOSCOPY N/A 2022    Procedure: COLONOSCOPY, WITH  BIOPSY;  Surgeon: Luis Alberto Jacobs MD;  Location: [facility] OR    COLONOSCOPY N/A 4/3/2023    Procedure: COLONOSCOPY, WITH BIOPSY;  Surgeon: Luis Alberto Jacobs MD;  Location: [facility] OR    COLONOSCOPY N/A 2024    Procedure: COLONOSCOPY, WITH POLYPECTOMY AND BIOPSY, WITH CLIP;  Surgeon: Luis Alberto Jacobs MD;  Location: [facility] OR      Allergies   Allergen Reactions    Ibuprofen      Pt has an autoimmune disease it causes a negative colon reaction , can tolerate tylenol    Levonorgestrel-Ethinyl Estrad Headache, Hives and Itching    Seasonale Headache, Hives and Itching    Latex Rash      Social History     Tobacco Use    Smoking status: Never     Passive exposure: Never    Smokeless tobacco: Never   Substance Use Topics    Alcohol use: Not Currently      Wt Readings from Last 1 Encounters:   06/10/25 77.1 kg (170 lb)        Anesthesia Evaluation   Pt has had prior anesthetic.     No history of anesthetic complications       ROS/MED HX  ENT/Pulmonary:       Neurologic:       Cardiovascular:       METS/Exercise Tolerance:     Hematologic:       Musculoskeletal:       GI/Hepatic: Comment: Crohn's disease of colon   History of ulcerative colitis           Renal/Genitourinary:       Endo:       Psychiatric/Substance Use:     (+) psychiatric history depression       Infectious Disease:       Malignancy:       Other:              Physical Exam  Airway  Mallampati: II  TM distance: >3 FB  Neck ROM: full  Mouth opening: unable to assess    Cardiovascular - normal exam  "  Dental   (+) Minor Abnormalities - some fillings, tiny chips      Pulmonary - normal exam      Neurological - normal exam  She appears awake, alert and oriented x3.    Other Findings       OUTSIDE LABS:  CBC:   Lab Results   Component Value Date    WBC 6.4 04/29/2025    WBC 6.5 03/18/2025    HGB 13.8 04/29/2025    HGB 13.6 03/18/2025    HCT 39.9 04/29/2025    HCT 40.5 03/18/2025     04/29/2025     03/18/2025     BMP:   Lab Results   Component Value Date     04/29/2025     03/02/2024    POTASSIUM 4.0 04/29/2025    POTASSIUM 3.5 03/02/2024    CHLORIDE 106 04/29/2025    CHLORIDE 102 03/02/2024    CO2 22 04/29/2025    CO2 21 (L) 03/02/2024    BUN 8.2 04/29/2025    BUN 6.4 03/02/2024    CR 0.68 04/29/2025    CR 0.74 03/02/2024     (H) 04/29/2025     (H) 03/02/2024     COAGS: No results found for: \"PTT\", \"INR\", \"FIBR\"  POC:   Lab Results   Component Value Date    HCG Negative 12/18/2024     HEPATIC:   Lab Results   Component Value Date    ALBUMIN 4.6 04/29/2025    PROTTOTAL 7.2 04/29/2025    ALT 19 04/29/2025    AST 23 04/29/2025    ALKPHOS 86 04/29/2025    BILITOTAL 0.5 04/29/2025     OTHER:   Lab Results   Component Value Date    VERITO 9.5 04/29/2025    LIPASE 20 03/02/2024    TSH 1.24 05/23/2023    CRP 0.3 02/04/2022    SED 10 01/02/2024       Anesthesia Plan    ASA Status:  2      NPO Status: NPO Appropriate   Anesthesia Type: MAC.  Airway: natural airway.  Induction: intravenous.  Maintenance: TIVA.   Techniques and Equipment:       - Monitoring Plan: standard ASA monitoring     Consents    Anesthesia Plan(s) and associated risks, benefits, and realistic alternatives discussed. Questions answered and patient/representative(s) expressed understanding.     - Discussed:     - Discussed with:  Patient               Postoperative Care         Comments:                   Marquez Reis MD    I have reviewed the pertinent notes and labs in the chart from the past 30 days and " "(re)examined the patient.  Any updates or changes from those notes are reflected in this note.    Clinically Significant Risk Factors Present on Admission                             # Overweight: Estimated body mass index is 26.23 kg/m  as calculated from the following:    Height as of this encounter: 1.715 m (5' 7.5\").    Weight as of this encounter: 77.1 kg (170 lb).                    "

## 2025-06-10 NOTE — ANESTHESIA POSTPROCEDURE EVALUATION
Patient: Arleen Owusu    Procedure: Procedure(s):  COLONOSCOPY, WITH BIOPSY       Anesthesia Type:  MAC    Note:  Disposition: Outpatient   Postop Pain Control: Uneventful            Sign Out: Well controlled pain   PONV: No   Neuro/Psych: Uneventful            Sign Out: Acceptable/Baseline neuro status   Airway/Respiratory: Uneventful            Sign Out: Acceptable/Baseline resp. status   CV/Hemodynamics: Uneventful            Sign Out: Acceptable CV status; No obvious hypovolemia; No obvious fluid overload   Other NRE: NONE   DID A NON-ROUTINE EVENT OCCUR? No           Last vitals:  Vitals Value Taken Time   /65 06/10/25 10:45   Temp 36.1  C (97  F) 06/10/25 10:45   Pulse 66 06/10/25 10:45   Resp 16 06/10/25 10:45   SpO2 100 % 06/10/25 10:47   Vitals shown include unfiled device data.    Electronically Signed By: Marquez Reis MD  Tona 10, 2025  12:21 PM

## 2025-06-10 NOTE — H&P
Arleen Owusu  [id number]  female  29 year old      Reason for procedure/surgery: Crohn's Disease    Patient Active Problem List   Diagnosis    Crohn's disease of colon (H)    History of ulcerative colitis    Tailor's bunion    Major depressive disorder with single episode, in partial remission    Encounter for IUD removal    Vitamin B12 deficiency (non anemic)       Past Surgical History:    Past Surgical History:   Procedure Laterality Date    COLONOSCOPY N/A 2/14/2022    Procedure: COLONOSCOPY, WITH  BIOPSY;  Surgeon: Luis Alberto Jacobs MD;  Location: [facility] OR    COLONOSCOPY N/A 4/3/2023    Procedure: COLONOSCOPY, WITH BIOPSY;  Surgeon: Luis Alberto Jacobs MD;  Location: [facility] OR    COLONOSCOPY N/A 2/19/2024    Procedure: COLONOSCOPY, WITH POLYPECTOMY AND BIOPSY, WITH CLIP;  Surgeon: Luis Alberto Jacobs MD;  Location: [facility] OR       Past Medical History:   Past Medical History:   Diagnosis Date    Autoimmune disease 2019    Ulcerative colitis    Depressive disorder 8/1/2014    Varicella 1998    As a child       Social History:   Social History     Tobacco Use    Smoking status: Never     Passive exposure: Never    Smokeless tobacco: Never   Substance Use Topics    Alcohol use: Not Currently       Family History:   Family History   Problem Relation Age of Onset    Diabetes Mother     Hypertension Mother     Depression Brother     Anxiety Disorder Sister        Allergies:   Allergies   Allergen Reactions    Ibuprofen      Pt has an autoimmune disease it causes a negative colon reaction , can tolerate tylenol    Levonorgestrel-Ethinyl Estrad Headache, Hives and Itching    Seasonale Headache, Hives and Itching    Latex Rash       Active Medications:   Current Outpatient Medications   Medication Sig Dispense Refill    azaTHIOprine (IMURAN) 50 MG tablet Take 50 mg by mouth. Take 2 tablets once daily      bisacodyl (DULCOLAX) 5 MG EC tablet Two days prior to exam take two (2) tablets at 4pm. One day prior  "to exam take two (2) tablets at 4pm 4 tablet 0    cyanocobalamin (CYANOCOBALAMIN) 1000 MCG/ML injection Inject 1 mL (1,000 mcg) into the muscle every 30 days 1 mL 11    fluticasone (FLONASE) 50 MCG/ACT nasal spray Spray 1-2 sprays into both nostrils 2 times daily as needed      ondansetron (ZOFRAN ODT) 4 MG ODT tab Take 1 tablet (4 mg) by mouth every 8 hours as needed for nausea. 30 tablet 3    polyethylene glycol (GOLYTELY) 236 g suspension Two days before procedure at 5PM fill first container with water. Mix and drink an 8 oz glass every 15 minutes until HALF of the container is gone. Place the remainder in the refrigerator. One day before procedure at 5PM drink second half of bowel prep. Drink an 8 oz glass every 15 minutes until it is gone. Day of procedure 6 hours before arrival time fill the 2nd container with water. Mix and drink an 8 oz glass every 15 minutes until HALF of the container is gone. Discard the remaining solution. 8000 mL 0    sertraline (ZOLOFT) 100 MG tablet Take 1 tablet (100 mg) by mouth 2 times daily. 180 tablet 3    ustekinumab (STELARA) 45 MG/0.5ML SOLN Inject 90mg (1mL) subcutaneously every 8 weeks 1 mL 2    EPINEPHrine (ANY BX GENERIC EQUIV) 0.3 MG/0.3ML injection 2-pack       NEEDLES, ANY SIZE Please use 27 gauge 1 inch needle to inject subcutaneous stelara as directed every 8 weeks. 10 each 1    Syringe Luer Slip (BD PLASTIPAK SYRINGE) 3 ML MISC 1 each once as directed. Use as directed to administer Stelara every 8 weeks. 3 each 4    Syringe/Needle, Disp, 20G X 1-1/2\" 3 ML MISC Use as directed with Stelara every 8 weeks. 10 each 1       Systemic Review:   CONSTITUTIONAL: NEGATIVE for fever, chills, change in weight  ENT/MOUTH: NEGATIVE for ear, mouth and throat problems  RESP: NEGATIVE for significant cough or SOB  CV: NEGATIVE for chest pain, palpitations or peripheral edema    Physical Examination:   Vital Signs: /76 (BP Location: Right arm)   Pulse 67   Temp 97.5  F (36.4 " " C) (Temporal)   Resp 18   Ht 1.715 m (5' 7.5\")   Wt 77.1 kg (170 lb)   SpO2 97%   BMI 26.23 kg/m    GENERAL: healthy, alert and no distress  NECK: no adenopathy, no asymmetry, masses, or scars  RESP: lungs clear to auscultation - no rales, rhonchi or wheezes  CV: regular rate and rhythm, normal S1 S2, no S3 or S4, no murmur, click or rub, no peripheral edema and peripheral pulses strong  ABDOMEN: soft, nontender, no hepatosplenomegaly, no masses and bowel sounds normal  MS: no gross musculoskeletal defects noted, no edema    Plan: Appropriate to proceed as scheduled.      Luis Alberto Jacobs MD  6/10/2025    PCP:  No primary care provider on file.    "

## 2025-06-10 NOTE — ANESTHESIA CARE TRANSFER NOTE
Patient: Arleen Owusu    Procedure: Procedure(s):  COLONOSCOPY, WITH BIOPSY       Diagnosis: Crohn's disease of colon without complication (H) [K50.10]  Diagnosis Additional Information: No value filed.    Anesthesia Type:   MAC     Note:    Oropharynx: oropharynx clear of all foreign objects and spontaneously breathing  Level of Consciousness: awake  Oxygen Supplementation: room air    Independent Airway: airway patency satisfactory and stable  Dentition: dentition unchanged  Vital Signs Stable: post-procedure vital signs reviewed and stable  Report to RN Given: handoff report given  Patient transferred to: Phase II    Handoff Report: Identifed the Patient, Identified the Reponsible Provider, Reviewed the pertinent medical history, Discussed the surgical course, Reviewed Intra-OP anesthesia mangement and issues during anesthesia, Set expectations for post-procedure period and Allowed opportunity for questions and acknowledgement of understanding      Vitals:  Vitals Value Taken Time   /95 06/10/25 10:32   Temp     Pulse 58 06/10/25 10:32   Resp     SpO2 100 % 06/10/25 10:34   Vitals shown include unfiled device data.    Electronically Signed By: GLORIA Russell CRNA  Tona 10, 2025  10:35 AM

## 2025-06-16 ENCOUNTER — RESULTS FOLLOW-UP (OUTPATIENT)
Dept: GASTROENTEROLOGY | Facility: CLINIC | Age: 30
End: 2025-06-16

## 2025-06-17 ENCOUNTER — OFFICE VISIT (OUTPATIENT)
Dept: URGENT CARE | Facility: URGENT CARE | Age: 30
End: 2025-06-17
Payer: COMMERCIAL

## 2025-06-17 VITALS
TEMPERATURE: 97.6 F | HEART RATE: 64 BPM | DIASTOLIC BLOOD PRESSURE: 74 MMHG | SYSTOLIC BLOOD PRESSURE: 117 MMHG | WEIGHT: 167 LBS | RESPIRATION RATE: 15 BRPM | BODY MASS INDEX: 25.31 KG/M2 | OXYGEN SATURATION: 97 % | HEIGHT: 68 IN

## 2025-06-17 DIAGNOSIS — R21 RASH: Primary | ICD-10-CM

## 2025-06-17 PROCEDURE — 3078F DIAST BP <80 MM HG: CPT | Performed by: PHYSICIAN ASSISTANT

## 2025-06-17 PROCEDURE — 3074F SYST BP LT 130 MM HG: CPT | Performed by: PHYSICIAN ASSISTANT

## 2025-06-17 PROCEDURE — 99213 OFFICE O/P EST LOW 20 MIN: CPT | Performed by: PHYSICIAN ASSISTANT

## 2025-06-17 NOTE — PROGRESS NOTES
Chief Complaint   Patient presents with    Rash     Last night started to have rash on the upper part of left arm  This morning spread down arm more     Urgent Care    Mouth Problem     This morning started to have tingling in lips   Throat sore        ASSESSMENT/PLAN:  Arleen was seen today for rash, urgent care and mouth problem.    Diagnoses and all orders for this visit:    Rash    Given the improvement with Benadryl I do think this is more of a hypersensitivity like reaction and would recommend using 2-3 second-generation antihistamines like Claritin Allegra or Zyrtec instead of Benadryl.  The forearm lesion does seem to be more of a bug bite unclear if that is related to the rash she experiencing also or not.  Patent airways.  No red flag signs or symptoms.  Follow-up with PCP if not improved over the next week, sooner if any new or worsening symptoms develop    Jake Harmon PA-C  ATTESTATION:  I saw and evaluated patient in conjunction with Yas Jacobs, [employer] student  I personally reviewed the vital signs.  I personally performed the the medical decision making for this visit   I personally performed the substantive portion of the physical exam  Jake Harmon PA-C    SUBJECTIVE:  Arleen is a 29 year old female who presents to urgent care with rash and tingly lips and burning in back of throat that started last night. Initially with rash on bicep area. This morning it spread to the forearm and neck and back.   denies SOB, CP, N/v/d, abd pain. Cough, wheeze, swelling of face, or difficulty  No Recent illnesses, fever, chills, nasal congestion  No new products, foods or other exposures.   Colonoscopy 1 week ago  Stelara 13 days ago, hasn't had issues with this since starting 3 years ago  Hx of seasonal allergies with hives, no longer has Epipen  Took 2 benadryl which she thinks helped    ROS: Pertinent ROS neg other than the symptoms noted above in the HPI.     OBJECTIVE:  /74   Pulse 64   Temp 97.6  " F (36.4  C) (Temporal)   Resp 15   Ht 1.715 m (5' 7.5\")   Wt 75.8 kg (167 lb)   SpO2 97%   BMI 25.77 kg/m     GENERAL: alert and no distress  EYES: Eyes grossly normal to inspection, PERRL and conjunctivae and sclerae normal  HENT: nose and mouth without ulcers or lesions  NECK: no adenopathy, no asymmetry, masses, or scars  RESP: lungs clear to auscultation - no rales, rhonchi or wheezes  CV: regular rate and rhythm, normal S1 S2, no S3 or S4, no murmur, click or rub, no peripheral edema   MS: no gross musculoskeletal defects noted, no edema  SKIN: Scattered maculopapular rash on left bicep that is erythematous, erythematous papule on forearm with small puncture in the middle  NEURO: Normal strength and tone, mentation intact and speech normal          DIAGNOSTICS    Results for orders placed or performed during the hospital encounter of 06/10/25   COLONOSCOPY     Status: None   Result Value Ref Range    COLONOSCOPY       [facility]  [address] [place] [address]., [place] [address] [phone]     Endoscopy Department  _______________________________________________________________________________  Patient Name: Arleen Owusu              Procedure Date: 6/10/2025 9:51 AM  MRN: [id number]                       Account Number: [id number]  YOB: 1995             Admit Type: Outpatient  Age: 29                               [facility]: [facility] 03  Gender: Female                        Note Status: Finalized  Attending MD: YANCY SCHMITZ MD,   Total Sedation Time:   _______________________________________________________________________________     Procedure:             Colonoscopy  Indications:           Disease activity assessment of Crohn's disease of the                          colon  Providers:             YANCY SCHMITZ MD, RENATA REYEZ, Debbie Reynolds RN, Gissell Ferrell, Technologist  Referring MD:          JOANNA OHARA "   Requesting Provider:   JOANNA OHARA  Medicines:             Monitored Anesthesia Care  Complications:         No immediate complications.  _______________________________________________________________________________  Procedure:             Pre-Anesthesia Assessment:                         - See EPIC H and P note                         After obtaining informed consent, the colonoscope was                          passed under direct vision. Throughout the procedure,                          the patient's blood pressure, pulse, and oxygen                          saturations were monitored continuously. The                          Colonoscope was introduced through the anus and                          advanced to the terminal ileum, with identification of                          the appendiceal orifice and IC valve. The colonoscopy                          was performed without difficulty. The patient                          tolerated the procedure well. The quality of  the bowel                          preparation was evaluated using the BBPS (Anson Bowel                          Preparation Scale) with scores of: Right Colon = 3,                          Transverse Colon = 3 and Left Colon = 3 (entire mucosa                          seen well with no residual staining, small fragments                          of stool or opaque liquid). The total BBPS score                          equals 9.                                                                                   Findings:       The perianal and digital rectal examinations were normal.       The terminal ileum appeared normal.       The entire examined colon appeared normal on direct and retroflexion        views.       The Simple Endoscopic Score for Crohn's Disease was determined based on        the endoscopic appearance of the mucosa in the following segments:       - Ileum: Findings include no ulcers present, no ulcerated surfaces,  no        affected surfaces and no narrowings. Segmen t score: 0.       - Right Colon: Findings include no ulcers present, no ulcerated        surfaces, no affected surfaces and no narrowings. Segment score: 0.       - Transverse Colon: Findings include no ulcers present, no ulcerated        surfaces, no affected surfaces and no narrowings. Segment score: 0.       - Left Colon: Findings include no ulcers present, no ulcerated surfaces,        no affected surfaces and no narrowings. Segment score: 0.       - Rectum: Findings include no ulcers present, no ulcerated surfaces, no        affected surfaces and no narrowings. Segment score: 0.       - Total SES-CD aggregate score: 0. Biopsies were taken with a cold        forceps for histology (right colon, left colon and rectum in separate        jars).                                                                                   Impression:            - The examined portion of the ileum was normal.                         - The entire examined colon is normal on direct and                           retroflexion views.                         - Simple Endoscopic Score for Crohn's Disease: 0,                          mucosal inflammatory changes secondary to Crohn's                          disease, in remission. Biopsied.                         She remains in endoscopic remission.  Recommendation:        - Discharge patient to home (with escort).                         - Resume previous diet.                         - Continue present medications.                         - Await pathology results.                         - Repeat colonoscopy in 3 years for surveillance based                          on pathology results.                         - Return to GI clinic.                                                                                     Electronically Signed by: Dr. Luis Alberto Schmitz  ___________________________  LUIS ALBERTO SCHMITZ MD  6/10/2025  10:32:46 AM  I was physically present for the entire viewing portion of the exam.  __________________________   Signature of teaching physician  LUIS ALBERTO SCHMITZ MD  Number of Addenda: 0    Note Initiated On: 6/10/2025 9:51 AM  Scope In: 10:14:52 AM  Scope Out: 10:28:49 AM     hCG qual urine POCT     Status: Normal   Result Value Ref Range    HCG Qual Urine Negative Negative    Internal QC Check POCT Valid Valid    POCT Kit Lot Number [id number]     POCT Kit Expiration Date 2026-12-08    hCG qual urine POCT     Status: Normal   Result Value Ref Range    HCG Qual Urine Negative Negative    Internal QC Check POCT Valid Valid    POCT Kit Lot Number [id number]     POCT Kit Expiration Date 26-12-08    Surgical Pathology Exam     Status: None   Result Value Ref Range    Case Report       Surgical Pathology Report                         Case: [id number]                                  Authorizing Provider:  Luis Alberto Schmitz,  Collected:           06/10/2025 10:20 AM                                 MD                                                                           Ordering Location:     [facility] [place] OR  Received:            06/10/2025 10:39 AM                                 [place]                                                                  Pathologist:           Paco Gonzales MD                                                                 Specimens:   A) - Large Intestine, Colon, Right Colon Biopsy                                                     B) - Large Intestine, Colon, Left Colon Biopsy                                                      C) - Large Intestine, Colon, Rectal Biopsy                                                 Final Diagnosis       A. RIGHT COLON, BIOPSY:  - Colonic mucosa with no significant histologic abnormality   -Negative for acute inflammation, granuloma  "formation, and dysplasia    B. LEFT COLON, BIOPSY:  - Colonic mucosa with no significant histologic abnormality   -Negative for acute inflammation, granuloma formation, and dysplasia    C.  RECTUM, BIOPSY:  - Colorectal mucosa with no significant histologic abnormality  - Negative for acute inflammation, granuloma formation, and dysplasia      Clinical Information       Crohn's disease        Gross Description       A(1). Large Intestine, Colon, Right Colon Biopsy:  The specimen is received in formalin with proper patient identification, labeled \"large intestine, right colon biopsy\".  The specimen consists of 8 gray-tan, irregular fragments of mucosal tissue, ranging from 0.1-0.8 cm in greatest dimension.  The specimen is submitted in toto as A1.   B(2). Large Intestine, Colon, Left Colon Biopsy:  The specimen is received in formalin with proper patient identification, labeled \"large intestine, left colon biopsy\".  The specimen consists of 7 gray-tan, irregular fragments of mucosal tissue, ranging from 0.1-0.4 cm in greatest dimension.  The specimen is submitted in toto as B1.   C(3). Large Intestine, Colon, Rectal Biopsy:  The specimen is received in formalin with proper patient identification, labeled \"large intestine, rectal biopsy\".  The specimen consists of 3 gray-tan, irregular fragments of mucosal tissue, ranging from 0.2-0.5 cm in greatest dimension.  The specimen is submitted in toto as C1.       Microscopic Description       LowMicroscopic examination was performed.      Case was reviewed by the following:  Pathology Fellow: Michelle Gamez MD  A resident or fellow in a training program was involved in the initial review, preparation, and/or interpretation of this case.  I, as the senior physician, attest that I have personally reviewed all specimens and or slides, including the listed special stains, and used them with my medical judgement to determine the final diagnosis.              Performing Labs "       The technical component of this testing was completed at [facility].    Stain controls for all stains resulted within this report have been reviewed and show appropriate reactivity.       Case Images          Current Outpatient Medications   Medication Sig Dispense Refill    azaTHIOprine (IMURAN) 50 MG tablet Take 50 mg by mouth. Take 2 tablets once daily      bisacodyl (DULCOLAX) 5 MG EC tablet Two days prior to exam take two (2) tablets at 4pm. One day prior to exam take two (2) tablets at 4pm 4 tablet 0    cyanocobalamin (CYANOCOBALAMIN) 1000 MCG/ML injection Inject 1 mL (1,000 mcg) into the muscle every 30 days 1 mL 11    EPINEPHrine (ANY BX GENERIC EQUIV) 0.3 MG/0.3ML injection 2-pack       fluticasone (FLONASE) 50 MCG/ACT nasal spray Spray 1-2 sprays into both nostrils 2 times daily as needed      NEEDLES, ANY SIZE Please use 27 gauge 1 inch needle to inject subcutaneous stelara as directed every 8 weeks. 10 each 1    ondansetron (ZOFRAN ODT) 4 MG ODT tab Take 1 tablet (4 mg) by mouth every 8 hours as needed for nausea. 30 tablet 3    polyethylene glycol (GOLYTELY) 236 g suspension Two days before procedure at 5PM fill first container with water. Mix and drink an 8 oz glass every 15 minutes until HALF of the container is gone. Place the remainder in the refrigerator. One day before procedure at 5PM drink second half of bowel prep. Drink an 8 oz glass every 15 minutes until it is gone. Day of procedure 6 hours before arrival time fill the 2nd container with water. Mix and drink an 8 oz glass every 15 minutes until HALF of the container is gone. Discard the remaining solution. 8000 mL 0    sertraline (ZOLOFT) 100 MG tablet Take 1 tablet (100 mg) by mouth 2 times daily. 180 tablet 3    Syringe Luer Slip (BD PLASTIPAK SYRINGE) 3 ML MISC 1 each once as directed. Use as directed to administer Stelara every 8 weeks. 3 each 4    Syringe/Needle, Disp,  "20G X 1-1/2\" 3 ML MISC Use as directed with Stelara every 8 weeks. 10 each 1    ustekinumab (STELARA) 45 MG/0.5ML SOLN Inject 90mg (1mL) subcutaneously every 8 weeks 1 mL 2     Current Facility-Administered Medications   Medication Dose Route Frequency Provider Last Rate Last Admin    medroxyPROGESTERone (DEPO-PROVERA) injection 150 mg  150 mg Intramuscular Q90 Days Kaya Perez CNM          Patient Active Problem List   Diagnosis    Crohn's disease of colon (H)    History of ulcerative colitis    Tailor's bunion    Major depressive disorder with single episode, in partial remission    Encounter for IUD removal    Vitamin B12 deficiency (non anemic)      Past Medical History:   Diagnosis Date    Autoimmune disease 2019    Ulcerative colitis    Depressive disorder 8/1/2014    Varicella 1998    As a child     Past Surgical History:   Procedure Laterality Date    COLONOSCOPY N/A 2/14/2022    Procedure: COLONOSCOPY, WITH  BIOPSY;  Surgeon: Luis Alberto Jacobs MD;  Location: [facility] OR    COLONOSCOPY N/A 4/3/2023    Procedure: COLONOSCOPY, WITH BIOPSY;  Surgeon: Luis Alberto Jacobs MD;  Location: [facility] OR    COLONOSCOPY N/A 2/19/2024    Procedure: COLONOSCOPY, WITH POLYPECTOMY AND BIOPSY, WITH CLIP;  Surgeon: Luis Alberto Jacobs MD;  Location: [facility] OR    COLONOSCOPY N/A 6/10/2025    Procedure: COLONOSCOPY, WITH BIOPSY;  Surgeon: Luis Alberto Jacobs MD;  Location: [facility] OR     Family History   Problem Relation Age of Onset    Diabetes Mother     Hypertension Mother     Depression Brother     Anxiety Disorder Sister      Social History     Tobacco Use    Smoking status: Never     Passive exposure: Never    Smokeless tobacco: Never   Substance Use Topics    Alcohol use: Not Currently              The plan of care was discussed with the patient. They understand and agree with the course of treatment prescribed. A printed summary was given including instructions and medications.  The use of " Dragon/[employer] dictation services may have been used to construct the content in this note; any grammatical or spelling errors are non-intentional. Please contact the author of this note directly if you are in need of any clarification.

## 2025-06-17 NOTE — PROGRESS NOTES
Urgent Care Clinic Visit    Chief Complaint   Patient presents with    Rash     Last night started to have rash on the upper part of left arm  This morning spread down arm more     Urgent Care    Mouth Problem     This morning started to have tingling in lips   Throat sore                6/17/2025    10:06 AM   Additional Questions   Roomed by gerald sanchez

## 2025-07-08 ENCOUNTER — TELEPHONE (OUTPATIENT)
Dept: PHARMACY | Facility: CLINIC | Age: 30
End: 2025-07-08
Payer: COMMERCIAL

## 2025-07-08 DIAGNOSIS — K50.10 CROHN'S DISEASE OF COLON WITHOUT COMPLICATION (H): Primary | ICD-10-CM

## 2025-07-08 RX ORDER — USTEKINUMAB 45 MG/.5ML
90 INJECTION, SOLUTION SUBCUTANEOUS
Qty: 1 ML | Refills: 5 | Status: SHIPPED | OUTPATIENT
Start: 2025-07-08

## 2025-07-08 RX ORDER — NEEDLES, SAFETY 18GX1 1/2"
NEEDLE, DISPOSABLE MISCELLANEOUS
Qty: 10 EACH | Refills: 1 | Status: SHIPPED | OUTPATIENT
Start: 2025-07-08

## 2025-07-08 NOTE — TELEPHONE ENCOUNTER
Yesintek required by insurance. Prescription sent using [facility] with Luis Alberto Jacobs MD.    Last provider visit: 2025 REA Trent  Next provider visit: 2025 MD Reynaldo  Last labs completed: 2025  Lab frequency: every 3 months   - standing labs available until 4/10/2026  Next labs due: 2025  Last TB screenin/15/2024    Hugh Sebastian, PharmD, BCPS  [place] Pharmacist   [facility]  Phone: [phone]

## 2025-08-10 ENCOUNTER — HEALTH MAINTENANCE LETTER (OUTPATIENT)
Age: 30
End: 2025-08-10

## 2025-08-16 ENCOUNTER — OFFICE VISIT (OUTPATIENT)
Dept: URGENT CARE | Facility: URGENT CARE | Age: 30
End: 2025-08-16
Payer: COMMERCIAL

## 2025-08-16 VITALS
HEART RATE: 66 BPM | RESPIRATION RATE: 18 BRPM | TEMPERATURE: 97.3 F | DIASTOLIC BLOOD PRESSURE: 76 MMHG | SYSTOLIC BLOOD PRESSURE: 110 MMHG | BODY MASS INDEX: 25.62 KG/M2 | OXYGEN SATURATION: 97 % | WEIGHT: 166 LBS

## 2025-08-16 DIAGNOSIS — R30.0 DYSURIA: ICD-10-CM

## 2025-08-16 DIAGNOSIS — N76.0 BV (BACTERIAL VAGINOSIS): ICD-10-CM

## 2025-08-16 DIAGNOSIS — J02.9 SORE THROAT: Primary | ICD-10-CM

## 2025-08-16 DIAGNOSIS — B96.89 BV (BACTERIAL VAGINOSIS): ICD-10-CM

## 2025-08-16 DIAGNOSIS — N89.8 VAGINAL DISCHARGE: ICD-10-CM

## 2025-08-16 LAB
ALBUMIN UR-MCNC: NEGATIVE MG/DL
APPEARANCE UR: CLEAR
BILIRUB UR QL STRIP: NEGATIVE
CLUE CELLS: ABNORMAL
COLOR UR AUTO: YELLOW
DEPRECATED S PYO AG THROAT QL EIA: NEGATIVE
GLUCOSE UR STRIP-MCNC: NEGATIVE MG/DL
HGB UR QL STRIP: NEGATIVE
KETONES UR STRIP-MCNC: NEGATIVE MG/DL
LEUKOCYTE ESTERASE UR QL STRIP: NEGATIVE
NITRATE UR QL: NEGATIVE
PH UR STRIP: 6 [PH] (ref 5–8)
S PYO DNA THROAT QL NAA+PROBE: NOT DETECTED
SP GR UR STRIP: 1.01 (ref 1–1.03)
TRICHOMONAS, WET PREP: ABNORMAL
UROBILINOGEN UR STRIP-ACNC: 0.2 E.U./DL
WBC'S/HIGH POWER FIELD, WET PREP: ABNORMAL
YEAST, WET PREP: ABNORMAL

## 2025-08-16 PROCEDURE — 3074F SYST BP LT 130 MM HG: CPT | Performed by: NURSE PRACTITIONER

## 2025-08-16 PROCEDURE — 99214 OFFICE O/P EST MOD 30 MIN: CPT | Performed by: NURSE PRACTITIONER

## 2025-08-16 PROCEDURE — 3078F DIAST BP <80 MM HG: CPT | Performed by: NURSE PRACTITIONER

## 2025-08-16 PROCEDURE — 81003 URINALYSIS AUTO W/O SCOPE: CPT | Performed by: NURSE PRACTITIONER

## 2025-08-16 PROCEDURE — 87210 SMEAR WET MOUNT SALINE/INK: CPT | Performed by: NURSE PRACTITIONER

## 2025-08-16 PROCEDURE — 87651 STREP A DNA AMP PROBE: CPT | Performed by: NURSE PRACTITIONER

## 2025-08-16 RX ORDER — CLINDAMYCIN HYDROCHLORIDE 300 MG/1
300 CAPSULE ORAL 2 TIMES DAILY
Qty: 14 CAPSULE | Refills: 0 | Status: SHIPPED | OUTPATIENT
Start: 2025-08-16 | End: 2025-08-23

## 2025-08-30 ENCOUNTER — LAB (OUTPATIENT)
Dept: LAB | Facility: CLINIC | Age: 30
End: 2025-08-30
Payer: COMMERCIAL

## 2025-08-30 DIAGNOSIS — K50.10 CROHN'S DISEASE OF COLON WITHOUT COMPLICATION (H): ICD-10-CM

## 2025-08-30 LAB
ALBUMIN SERPL BCG-MCNC: 4.2 G/DL (ref 3.5–5.2)
ALP SERPL-CCNC: 93 U/L (ref 40–150)
ALT SERPL W P-5'-P-CCNC: 13 U/L (ref 0–50)
AST SERPL W P-5'-P-CCNC: 18 U/L (ref 0–45)
BASOPHILS # BLD AUTO: 0.03 10E3/UL (ref 0–0.2)
BASOPHILS NFR BLD AUTO: 0.4 %
BILIRUB SERPL-MCNC: 0.4 MG/DL
BILIRUBIN DIRECT (ROCHE PRO & PURE): 0.12 MG/DL (ref 0–0.45)
C CAYETANENSIS DNA STL QL NAA+NON-PROBE: NEGATIVE
CAMPYLOBACTER DNA SPEC NAA+PROBE: NEGATIVE
CRP SERPL-MCNC: <3 MG/L
CRYPTOSP DNA STL QL NAA+NON-PROBE: NEGATIVE
EC STX1+STX2 GENES STL QL NAA+NON-PROBE: NEGATIVE
EOSINOPHIL # BLD AUTO: 0.07 10E3/UL (ref 0–0.7)
EOSINOPHIL NFR BLD AUTO: 0.9 %
ERYTHROCYTE [DISTWIDTH] IN BLOOD BY AUTOMATED COUNT: 13.8 % (ref 10–15)
G LAMBLIA DNA STL QL NAA+NON-PROBE: NEGATIVE
HCT VFR BLD AUTO: 38.2 % (ref 35–47)
HGB BLD-MCNC: 12.9 G/DL (ref 11.7–15.7)
IMM GRANULOCYTES # BLD: <0.03 10E3/UL
IMM GRANULOCYTES NFR BLD: 0.2 %
LYMPHOCYTES # BLD AUTO: 2.95 10E3/UL (ref 0.8–5.3)
LYMPHOCYTES NFR BLD AUTO: 36.1 %
MCH RBC QN AUTO: 28.7 PG (ref 26.5–33)
MCHC RBC AUTO-ENTMCNC: 33.8 G/DL (ref 31.5–36.5)
MCV RBC AUTO: 85.1 FL (ref 78–100)
MONOCYTES # BLD AUTO: 0.45 10E3/UL (ref 0–1.3)
MONOCYTES NFR BLD AUTO: 5.5 %
NEUTROPHILS # BLD AUTO: 4.65 10E3/UL (ref 1.6–8.3)
NEUTROPHILS NFR BLD AUTO: 56.9 %
NOROVIRUS GI+II RNA STL QL NAA+NON-PROBE: NEGATIVE
NRBC # BLD AUTO: <0.03 10E3/UL
NRBC BLD AUTO-RTO: 0 /100
PLATELET # BLD AUTO: 244 10E3/UL (ref 150–450)
PROT SERPL-MCNC: 6.7 G/DL (ref 6.4–8.3)
RBC # BLD AUTO: 4.49 10E6/UL (ref 3.8–5.2)
SALMONELLA SP RPOD STL QL NAA+PROBE: NEGATIVE
SHIGELLA SP+EIEC IPAH ST NAA+NON-PROBE: NEGATIVE
VIBRIO DNA SPEC NAA+PROBE: NEGATIVE
WBC # BLD AUTO: 8.17 10E3/UL (ref 4–11)
Y ENTEROCOL DNA STL QL NAA+PROBE: NEGATIVE

## 2025-08-30 PROCEDURE — 80076 HEPATIC FUNCTION PANEL: CPT | Performed by: PATHOLOGY

## 2025-08-30 PROCEDURE — 83993 ASSAY FOR CALPROTECTIN FECAL: CPT | Performed by: INTERNAL MEDICINE

## 2025-08-30 PROCEDURE — 85025 COMPLETE CBC W/AUTO DIFF WBC: CPT | Performed by: PATHOLOGY

## 2025-08-30 PROCEDURE — 86140 C-REACTIVE PROTEIN: CPT | Performed by: PATHOLOGY

## 2025-08-30 PROCEDURE — 99000 SPECIMEN HANDLING OFFICE-LAB: CPT | Performed by: PATHOLOGY

## 2025-08-30 PROCEDURE — 36415 COLL VENOUS BLD VENIPUNCTURE: CPT | Performed by: PATHOLOGY

## 2025-08-30 PROCEDURE — 87506 IADNA-DNA/RNA PROBE TQ 6-11: CPT | Performed by: INTERNAL MEDICINE

## 2025-09-01 LAB — CALPROTECTIN STL-MCNT: 12.1 MG/KG (ref 0–49.9)

## (undated) DEVICE — KIT ENDO TURNOVER/PROCEDURE CARRY-ON 101822

## (undated) DEVICE — SOL WATER IRRIG 500ML BOTTLE 2F7113

## (undated) DEVICE — ENDO TRAP POLYP E-TRAP 00711099

## (undated) DEVICE — GOWN ISOLATION YELLOW UNIV NOT STERILE 3110PG

## (undated) DEVICE — INFLATION DEVICE BIG 60 ENDO-AN6012

## (undated) DEVICE — TUBING SUCTION MEDI-VAC 1/4"X20' N620A

## (undated) DEVICE — ENDO FORCEP ENDOJAW BIOPSY 2.8MMX230CM FB-220U

## (undated) DEVICE — GOWN IMPERVIOUS 2XL BLUE

## (undated) DEVICE — TUBING SUCTION 10'X3/16" N510

## (undated) DEVICE — SPECIMEN CONTAINER 3OZ W/FORMALIN 59901

## (undated) DEVICE — SUCTION MANIFOLD NEPTUNE 2 SYS 1 PORT 702-025-000

## (undated) DEVICE — KIT ENDO FIRST STEP DISINFECTANT 200ML W/POUCH EP-4

## (undated) DEVICE — ENDO SNARE POLYPECTOMY OVAL 10MM LOOP SD-240U-10

## (undated) DEVICE — SNARE CAPIVATOR ROUND COLD SNR BX10 M00561101

## (undated) DEVICE — TUBING SUCTION 12"X1/4" N612

## (undated) DEVICE — ENDO FORCEP BX CAPTURA PRO SPIKE G50696

## (undated) DEVICE — PAD CHUX UNDERPAD 30X36" P3036C

## (undated) DEVICE — CLIP HEMOSTATIC ASSURANCE W11 MM 00711882

## (undated) RX ORDER — CYANOCOBALAMIN 1000 UG/ML
INJECTION, SOLUTION INTRAMUSCULAR; SUBCUTANEOUS
Status: DISPENSED
Start: 2024-06-19

## (undated) RX ORDER — CYANOCOBALAMIN 1000 UG/ML
INJECTION, SOLUTION INTRAMUSCULAR; SUBCUTANEOUS
Status: DISPENSED
Start: 2024-01-17

## (undated) RX ORDER — CYANOCOBALAMIN 1000 UG/ML
INJECTION, SOLUTION INTRAMUSCULAR; SUBCUTANEOUS
Status: DISPENSED
Start: 2024-03-29

## (undated) RX ORDER — CYANOCOBALAMIN 1000 UG/ML
INJECTION, SOLUTION INTRAMUSCULAR; SUBCUTANEOUS
Status: DISPENSED
Start: 2023-11-07

## (undated) RX ORDER — CYANOCOBALAMIN 1000 UG/ML
INJECTION, SOLUTION INTRAMUSCULAR; SUBCUTANEOUS
Status: DISPENSED
Start: 2023-05-05

## (undated) RX ORDER — MEDROXYPROGESTERONE ACETATE 150 MG/ML
INJECTION, SUSPENSION INTRAMUSCULAR
Status: DISPENSED
Start: 2024-05-08

## (undated) RX ORDER — CYANOCOBALAMIN 1000 UG/ML
INJECTION, SOLUTION INTRAMUSCULAR; SUBCUTANEOUS
Status: DISPENSED
Start: 2024-05-10

## (undated) RX ORDER — CYANOCOBALAMIN 1000 UG/ML
INJECTION, SOLUTION INTRAMUSCULAR; SUBCUTANEOUS
Status: DISPENSED
Start: 2023-06-02

## (undated) RX ORDER — CYANOCOBALAMIN 1000 UG/ML
INJECTION, SOLUTION INTRAMUSCULAR; SUBCUTANEOUS
Status: DISPENSED
Start: 2024-08-07

## (undated) RX ORDER — CYANOCOBALAMIN 1000 UG/ML
INJECTION, SOLUTION INTRAMUSCULAR; SUBCUTANEOUS
Status: DISPENSED
Start: 2023-07-03